# Patient Record
Sex: FEMALE | Race: WHITE | Employment: UNEMPLOYED | URBAN - METROPOLITAN AREA
[De-identification: names, ages, dates, MRNs, and addresses within clinical notes are randomized per-mention and may not be internally consistent; named-entity substitution may affect disease eponyms.]

---

## 2017-01-30 ENCOUNTER — APPOINTMENT (OUTPATIENT)
Dept: GENERAL RADIOLOGY | Age: 57
End: 2017-01-30
Attending: PHYSICIAN ASSISTANT
Payer: SELF-PAY

## 2017-01-30 ENCOUNTER — HOSPITAL ENCOUNTER (EMERGENCY)
Age: 57
Discharge: HOME | End: 2017-01-30
Attending: EMERGENCY MEDICINE
Payer: SELF-PAY

## 2017-01-30 VITALS
TEMPERATURE: 97.3 F | DIASTOLIC BLOOD PRESSURE: 70 MMHG | HEART RATE: 82 BPM | RESPIRATION RATE: 16 BRPM | BODY MASS INDEX: 20.27 KG/M2 | SYSTOLIC BLOOD PRESSURE: 118 MMHG | WEIGHT: 126.13 LBS | HEIGHT: 66 IN | OXYGEN SATURATION: 99 %

## 2017-01-30 DIAGNOSIS — S39.012A LUMBAR STRAIN, INITIAL ENCOUNTER: Primary | ICD-10-CM

## 2017-01-30 LAB
APPEARANCE UR: ABNORMAL
BACTERIA URNS QL MICRO: ABNORMAL /HPF
BILIRUB UR QL: NEGATIVE
COLOR UR: ABNORMAL
EPITH CASTS URNS QL MICRO: ABNORMAL /LPF
GLUCOSE UR STRIP.AUTO-MCNC: NEGATIVE MG/DL
HGB UR QL STRIP: NEGATIVE
HYALINE CASTS URNS QL MICRO: ABNORMAL /LPF (ref 0–5)
KETONES UR QL STRIP.AUTO: NEGATIVE MG/DL
LEUKOCYTE ESTERASE UR QL STRIP.AUTO: NEGATIVE
NITRITE UR QL STRIP.AUTO: POSITIVE
PH UR STRIP: 5.5 [PH] (ref 5–8)
PROT UR STRIP-MCNC: NEGATIVE MG/DL
RBC #/AREA URNS HPF: ABNORMAL /HPF (ref 0–5)
SP GR UR REFRACTOMETRY: 1.02 (ref 1–1.03)
UROBILINOGEN UR QL STRIP.AUTO: 0.2 EU/DL (ref 0.2–1)
WBC URNS QL MICRO: ABNORMAL /HPF (ref 0–4)

## 2017-01-30 PROCEDURE — 99283 EMERGENCY DEPT VISIT LOW MDM: CPT

## 2017-01-30 PROCEDURE — 74011250637 HC RX REV CODE- 250/637: Performed by: PHYSICIAN ASSISTANT

## 2017-01-30 PROCEDURE — 81001 URINALYSIS AUTO W/SCOPE: CPT | Performed by: EMERGENCY MEDICINE

## 2017-01-30 PROCEDURE — 72100 X-RAY EXAM L-S SPINE 2/3 VWS: CPT

## 2017-01-30 PROCEDURE — 74011250636 HC RX REV CODE- 250/636: Performed by: PHYSICIAN ASSISTANT

## 2017-01-30 PROCEDURE — 96372 THER/PROPH/DIAG INJ SC/IM: CPT

## 2017-01-30 RX ORDER — KETOROLAC TROMETHAMINE 30 MG/ML
30 INJECTION, SOLUTION INTRAMUSCULAR; INTRAVENOUS
Status: COMPLETED | OUTPATIENT
Start: 2017-01-30 | End: 2017-01-30

## 2017-01-30 RX ORDER — CYCLOBENZAPRINE HCL 10 MG
10 TABLET ORAL
Qty: 10 TAB | Refills: 0 | Status: SHIPPED | OUTPATIENT
Start: 2017-01-30 | End: 2018-04-05

## 2017-01-30 RX ORDER — CYCLOBENZAPRINE HCL 10 MG
10 TABLET ORAL
Status: COMPLETED | OUTPATIENT
Start: 2017-01-30 | End: 2017-01-30

## 2017-01-30 RX ORDER — HYDROCODONE BITARTRATE AND ACETAMINOPHEN 5; 325 MG/1; MG/1
1 TABLET ORAL
Status: COMPLETED | OUTPATIENT
Start: 2017-01-30 | End: 2017-01-30

## 2017-01-30 RX ORDER — IBUPROFEN 600 MG/1
600 TABLET ORAL
Qty: 15 TAB | Refills: 0 | Status: SHIPPED | OUTPATIENT
Start: 2017-01-30 | End: 2019-06-07

## 2017-01-30 RX ORDER — HYDROCODONE BITARTRATE AND ACETAMINOPHEN 5; 325 MG/1; MG/1
1 TABLET ORAL
Qty: 6 TAB | Refills: 0 | Status: SHIPPED | OUTPATIENT
Start: 2017-01-30 | End: 2018-04-05

## 2017-01-30 RX ADMIN — CYCLOBENZAPRINE HYDROCHLORIDE 10 MG: 10 TABLET, FILM COATED ORAL at 12:48

## 2017-01-30 RX ADMIN — HYDROCODONE BITARTRATE AND ACETAMINOPHEN 1 TABLET: 5; 325 TABLET ORAL at 12:48

## 2017-01-30 RX ADMIN — KETOROLAC TROMETHAMINE 30 MG: 30 INJECTION, SOLUTION INTRAMUSCULAR at 12:48

## 2017-01-30 NOTE — ED PROVIDER NOTES
HPI Comments: 64 y.o. female with extensive past medical history, please see list, significant for acid indigestion, CP, dizziness, alopecia, joint pain, joint swelling, trouble sleeping, PUD, depression, H. Pylori infection, migraines, hyperlipidemia, osteopenia, inflammatory arthritis, sicca syndrome, Sjogren's disease, diverticulosis of colon, lupus, breast biopsy, and  section who presents from home with chief complaint of back pain. Pt c/o lower R-sided back pain described as pinching rated 8/10 that started yesterday. Pt claims she bent over yesterday to  laundry when she felt a pinch in her lower R-side of her back. Pt claims that the pain has been constant and worsening since then. Pt denies radiation of pain into the  legs. Pt reports it is worse with movement and when stepping on her R foot she can feel the pain in her back. Pt took ibuprofen and tylenol with no relief, has also used topical OTC remedies. Pt reports she took 1 dose of Dennis's for pain relief this morning. Pt denies any fall or trauma to her back. Pt denies any urinary problems (other than frequency, which has been chronic for 5 years) or bowel issues. Pt denies any numbness, weakness, fever, dysuria, hematuria, CP, vomiting, diarrhea, or SOB. Patient denies weakness or numbness in the legs, urinary retention, incontinence of bowel or bladder, perineal numbness, fever, gait disturbance, or history of IV drug abuse. Pt denies having any surgeries on her back, or seeing a specialist for her back. There are no other acute medical concerns at this time. PCP: Lizzie Sotelo MD    Note written by Clara Milton Che, as dictated by KALA Forrester 12:53 PM        The history is provided by the patient. No  was used.         Past Medical History:   Diagnosis Date    Acid indigestion     Alopecia     Chest pain     Depression 3/2/2011    Diverticulosis of colon     Dizziness     External hemorrhoid     Family history of esophageal cancer     H. pylori infection     Hyperlipidemia     Hyperlipidemia     Inflammatory arthritis     Joint pain     Joint swelling     Lupus ([address]     Lupus ([address]     Migraines     Muscle aches     Osteopenia      DEXA 11    PUD (peptic ulcer disease)     Sicca syndrome ([address]     Sicca syndrome ([address] 2012    Sjogren's disease ([address] 2011    Sjogren's disease ([address] 2011    Sjogrens syndrome ([address]     Stiffness joints     Trouble in sleeping        Past Surgical History:   Procedure Laterality Date    Biopsy breast  2011     Benign    Hx  section       x 3         Family History:   Problem Relation Age of Onset    Cancer Mother 54     breast    Cancer Father      throat,stomach       Social History     Social History    Marital status:      Spouse name: Anand    Number of children: Chadwick    Years of education: 8     Occupational History    unemployed      Social History Main Topics    Smoking status: Current Some Day Smoker     Packs/day: 0.50     Years: 35.00     Types: Cigarettes    Smokeless tobacco: Never Used      Comment: 4-6 cigarettes per day    Alcohol use No    Drug use: No    Sexual activity: Yes     Partners: Male      Comment:  occasional get together     Other Topics Concern     Service No    Blood Transfusions No    Caffeine Concern No    Seat Belt Yes     sometimes    Self-Exams Yes     Social History Narrative         ALLERGIES: Mobic [meloxicam]; Codeine [codeine]; and Voltaren [diclofenac sodium]    Review of Systems   Constitutional: Negative for fever. Respiratory: Negative for shortness of breath. Cardiovascular: Negative for chest pain. Gastrointestinal: Negative for constipation, diarrhea and vomiting. Genitourinary: Negative for dysuria and hematuria. Musculoskeletal: Positive for back pain. Neurological: Negative for weakness and numbness.    All other systems reviewed and are negative. Vitals:    01/30/17 1157   BP: 121/63   Pulse: 90   Resp: 16   Temp: 97.3 °F (36.3 °C)   SpO2: 99%   Weight: 57.2 kg (126 lb 2 oz)   Height: 5' 6\" (1.676 m)            Physical Exam   Constitutional: She is oriented to person, place, and time. She appears well-developed and well-nourished. No distress. Pleasant WF   HENT:   Head: Normocephalic and atraumatic. Right Ear: External ear normal.   Left Ear: External ear normal.   Eyes: Conjunctivae are normal. No scleral icterus. Neck: Neck supple. No tracheal deviation present. Cardiovascular: Normal rate, regular rhythm and normal heart sounds. Exam reveals no gallop and no friction rub. No murmur heard. Pulmonary/Chest: Effort normal and breath sounds normal. No stridor. No respiratory distress. She has no wheezes. Abdominal: Soft. She exhibits no distension. Musculoskeletal: Normal range of motion. + right lumbar TTP without midline TTP   Neurological: She is alert and oriented to person, place, and time. 5/5 dorsi and plantar flexion   Skin: Skin is warm and dry. Psychiatric: She has a normal mood and affect. Her behavior is normal.   Nursing note and vitals reviewed. MDM  Number of Diagnoses or Management Options  Lumbar strain, initial encounter:   Diagnosis management comments: 64year old female presenting to the ED for right lower back pain, onset while bending over to  laundry yesterday. No radicular/cord compression symptoms. Good distal strength on exam.  Mild DDD on XR, o/w unremarkable.  reviewed. Discussed use of NSAID, muscle relaxer, pain medicine PRN, spine f/u PRN. Amount and/or Complexity of Data Reviewed  Tests in the radiology section of CPT®: reviewed and ordered  Discuss the patient with other providers: yes (Dr. Jose Antonio Valdez, ED attending)      ED Course       Procedures          reviewed, 1 Rx in the last year.   KALA Bhardwaj  1:45 PM

## 2017-01-30 NOTE — DISCHARGE INSTRUCTIONS
We hope that we have addressed all of your medical concerns. The examination and treatment you received in the Emergency Department were for an emergent problem and were not intended as complete care. It is important that you follow up with your healthcare provider(s) for ongoing care. If your symptoms worsen or do not improve as expected, and you are unable to reach your usual health care provider(s), you should return to the Emergency Department. Today's healthcare is undergoing tremendous change, and patient satisfaction surveys are one of the many tools to assess the quality of medical care. You may receive a survey from the [employer] regarding your experience in the Emergency Department. I hope that your experience has been completely positive, particularly the medical care that I provided. As such, please participate in the survey; anything less than excellent does not meet my expectations or intentions. [facility] and [address] [place] [address] participate in nationally recognized quality of care measures. If your blood pressure is greater than 120/80, as reported below, we urge that you seek medical care to address the potential of high blood pressure, commonly known as hypertension. Hypertension can be hereditary or can be caused by certain medical conditions, pain, stress, or \"white coat syndrome. \"       Please make an appointment with your health care provider(s) for follow up of your Emergency Department visit. VITALS:   Patient Vitals for the past 8 hrs:   Temp Pulse Resp BP SpO2   01/30/17 1157 97.3 °F (36.3 °C) 90 16 121/63 99 %          Thank you for allowing us to provide you with medical care today. We realize that you have many choices for your emergency care needs. Please choose us in the future for any continued health care needs. Molly Stoddard, [facility].   Office: [phone]            Recent Results (from the past 24 hour(s))   URINALYSIS W/MICROSCOPIC    Collection Time: 01/30/17  1:05 PM   Result Value Ref Range    Color YELLOW/STRAW      Appearance CLOUDY (A) CLEAR      Specific gravity 1.025 1.003 - 1.030      pH (UA) 5.5 5.0 - 8.0      Protein NEGATIVE  NEG mg/dL    Glucose NEGATIVE  NEG mg/dL    Ketone NEGATIVE  NEG mg/dL    Bilirubin NEGATIVE  NEG      Blood NEGATIVE  NEG      Urobilinogen 0.2 0.2 - 1.0 EU/dL    Nitrites POSITIVE (A) NEG      Leukocyte Esterase NEGATIVE  NEG      WBC 0-4 0 - 4 /hpf    RBC 0-5 0 - 5 /hpf    Epithelial cells MODERATE (A) FEW /lpf    Bacteria 4+ (A) NEG /hpf    Hyaline Cast 2-5 0 - 5 /lpf       Xr Spine Lumb 2 Or 3 V    Result Date: 1/30/2017  INDICATION: Back pain following lifting injury yesterday COMPARISON: December 15, 2015 FINDINGS: AP, lateral, and coned-down lateral views of the lumbar spine demonstrate 5 lumbar type vertebral bodies. The alignment is normal. There is no evidence of acute fracture. Degenerative disc disease is again seen at L5-S1. The sacroiliac joints appear intact. No soft tissue abnormalities are seen. IMPRESSION: No evidence of lumbar spine fracture or malalignment. Back Pain, Emergency or Urgent Symptoms: Care Instructions  Your Care Instructions  Many people have back pain at one time or another. In most cases, pain gets better with self-care that includes over-the-counter pain medicine, ice, heat, and exercises. Unless you have symptoms of a severe injury or heart attack, you may be able to give yourself a few days before you call a doctor. But some back problems are very serious. Do not ignore symptoms that need to be checked right away. Follow-up care is a key part of your treatment and safety. Be sure to make and go to all appointments, and call your doctor if you are having problems. It's also a good idea to know your test results and keep a list of the medicines you take.   How can you care for yourself at home? · Sit or lie in positions that are most comfortable and that reduce your pain. Try one of these positions when you lie down:  ¨ Lie on your back with your knees bent and supported by large pillows. ¨ Lie on the floor with your legs on the seat of a sofa or chair. Addison Loach on your side with your knees and hips bent and a pillow between your legs. ¨ Lie on your stomach if it does not make pain worse. · Do not sit up in bed, and avoid soft couches and twisted positions. Bed rest can help relieve pain at first, but it delays healing. Avoid bed rest after the first day. · Change positions every 30 minutes. If you must sit for long periods of time, take breaks from sitting. Get up and walk around, or lie flat. · Try using a heating pad on a low or medium setting, for 15 to 20 minutes every 2 or 3 hours. Try a warm shower in place of one session with the heating pad. You can also buy single-use heat wraps that last up to 8 hours. You can also try ice or cold packs on your back for 10 to 20 minutes at a time, several times a day. (Put a thin cloth between the ice pack and your skin.) This reduces pain and makes it easier to be active and exercise. · Take pain medicines exactly as directed. ¨ If the doctor gave you a prescription medicine for pain, take it as prescribed. ¨ If you are not taking a prescription pain medicine, ask your doctor if you can take an over-the-counter medicine. When should you call for help? Call 911 anytime you think you may need emergency care. For example, call if:  · You are unable to move a leg at all. · You have back pain with severe belly pain. · You have symptoms of a heart attack. These may include:  ¨ Chest pain or pressure, or a strange feeling in the chest.  ¨ Sweating. ¨ Shortness of breath. ¨ Nausea or vomiting. ¨ Pain, pressure, or a strange feeling in the back, neck, jaw, or upper belly or in one or both shoulders or arms.   ¨ Lightheadedness or sudden weakness. ¨ A fast or irregular heartbeat. After you call 911, the  may tell you to chew 1 adult-strength or 2 to 4 low-dose aspirin. Wait for an ambulance. Do not try to drive yourself. Call your doctor now or seek immediate medical care if:  · You have new or worse symptoms in your arms, legs, chest, belly, or buttocks. Symptoms may include:  ¨ Numbness or tingling. ¨ Weakness. ¨ Pain. · You lose bladder or bowel control. · You have back pain and:  ¨ You have injured your back while lifting or doing some other activity. Call if the pain is severe, has not gone away after 1 or 2 days, and you cannot do your normal daily activities. ¨ You have had a back injury before that needed treatment. ¨ Your pain has lasted longer than 4 weeks. ¨ You have had weight loss you cannot explain. ¨ You are age 48 or older. ¨ You have cancer now or have had it before. Watch closely for changes in your health, and be sure to contact your doctor if you are not getting better as expected. Where can you learn more? Go to http://jaja-ammon.info/. Enter N132 in the search box to learn more about \"Back Pain, Emergency or Urgent Symptoms: Care Instructions. \"  Current as of: May 27, 2016  Content Version: 11.1  © 8801-3844 [employer]. Care instructions adapted under license by [employer] (which disclaims liability or warranty for this information). If you have questions about a medical condition or this instruction, always ask your healthcare professional. [place] [address] any warranty or liability for your use of this information. Back Pain: Care Instructions  Your Care Instructions    Back pain has many possible causes. It is often related to problems with muscles and ligaments of the back. It may also be related to problems with the nerves, discs, or bones of the back.  Moving, lifting, standing, sitting, or sleeping in an awkward way can strain the back. Sometimes you don't notice the injury until later. Arthritis is another common cause of back pain. Although it may hurt a lot, back pain usually improves on its own within several weeks. Most people recover in 12 weeks or less. Using good home treatment and being careful not to stress your back can help you feel better sooner. Follow-up care is a key part of your treatment and safety. Be sure to make and go to all appointments, and call your doctor if you are having problems. Its also a good idea to know your test results and keep a list of the medicines you take. How can you care for yourself at home? · Sit or lie in positions that are most comfortable and reduce your pain. Try one of these positions when you lie down:  ¨ Lie on your back with your knees bent and supported by large pillows. ¨ Lie on the floor with your legs on the seat of a sofa or chair. Sherly Glo on your side with your knees and hips bent and a pillow between your legs. ¨ Lie on your stomach if it does not make pain worse. · Do not sit up in bed, and avoid soft couches and twisted positions. Bed rest can help relieve pain at first, but it delays healing. Avoid bed rest after the first day of back pain. · Change positions every 30 minutes. If you must sit for long periods of time, take breaks from sitting. Get up and walk around, or lie in a comfortable position. · Try using a heating pad on a low or medium setting for 15 to 20 minutes every 2 or 3 hours. Try a warm shower in place of one session with the heating pad. · You can also try an ice pack for 10 to 15 minutes every 2 to 3 hours. Put a thin cloth between the ice pack and your skin. · Take pain medicines exactly as directed. ¨ If the doctor gave you a prescription medicine for pain, take it as prescribed. ¨ If you are not taking a prescription pain medicine, ask your doctor if you can take an over-the-counter medicine. · Take short walks several times a day. You can start with 5 to 10 minutes, 3 or 4 times a day, and work up to longer walks. Walk on level surfaces and avoid hills and stairs until your back is better. · Return to work and other activities as soon as you can. Continued rest without activity is usually not good for your back. · To prevent future back pain, do exercises to stretch and strengthen your back and stomach. Learn how to use good posture, safe lifting techniques, and proper body mechanics. When should you call for help? Call your doctor now or seek immediate medical care if:  · You have new or worsening numbness in your legs. · You have new or worsening weakness in your legs. (This could make it hard to stand up.)  · You lose control of your bladder or bowels. Watch closely for changes in your health, and be sure to contact your doctor if:  · Your pain gets worse. · You are not getting better after 2 weeks. Where can you learn more? Go to http://jaja-ammon.info/. Enter C599 in the search box to learn more about \"Back Pain: Care Instructions. \"  Current as of: May 23, 2016  Content Version: 11.1  © 8379-7229 "[employer]. Care instructions adapted under license by [employer] (which disclaims liability or warranty for this information). If you have questions about a medical condition or this instruction, always ask your healthcare professional. [address] any warranty or liability for your use of this information.

## 2017-01-30 NOTE — Clinical Note
- return for new or concerning symptoms 
- if pain worsens or does not improve, follow up with Dr. Matt Howard Pain medicine may cause drowsiness; take with caution

## 2017-01-30 NOTE — ED TRIAGE NOTES
Triage Note: Patient reports non-radiating right lower back pain after lifting some laundry yesterday. Patient also reports urinary frequency x 5 years.

## 2017-11-27 ENCOUNTER — APPOINTMENT (OUTPATIENT)
Dept: GENERAL RADIOLOGY | Age: 57
End: 2017-11-27
Attending: EMERGENCY MEDICINE
Payer: SELF-PAY

## 2017-11-27 ENCOUNTER — HOSPITAL ENCOUNTER (EMERGENCY)
Age: 57
Discharge: HOME | End: 2017-11-27
Attending: EMERGENCY MEDICINE | Admitting: EMERGENCY MEDICINE
Payer: SELF-PAY

## 2017-11-27 VITALS
BODY MASS INDEX: 17.79 KG/M2 | HEART RATE: 79 BPM | TEMPERATURE: 97.9 F | DIASTOLIC BLOOD PRESSURE: 64 MMHG | SYSTOLIC BLOOD PRESSURE: 120 MMHG | WEIGHT: 110.67 LBS | OXYGEN SATURATION: 99 % | RESPIRATION RATE: 16 BRPM | HEIGHT: 66 IN

## 2017-11-27 DIAGNOSIS — R07.89 ATYPICAL CHEST PAIN: ICD-10-CM

## 2017-11-27 DIAGNOSIS — M46.1 SACROILIAC INFLAMMATION (HCC): Primary | ICD-10-CM

## 2017-11-27 DIAGNOSIS — J43.9 PULMONARY EMPHYSEMA, UNSPECIFIED EMPHYSEMA TYPE (HCC): ICD-10-CM

## 2017-11-27 LAB
ATRIAL RATE: 79 BPM
CALCULATED P AXIS, ECG09: -25 DEGREES
CALCULATED R AXIS, ECG10: -12 DEGREES
CALCULATED T AXIS, ECG11: -3 DEGREES
DIAGNOSIS, 93000: NORMAL
P-R INTERVAL, ECG05: 146 MS
Q-T INTERVAL, ECG07: 378 MS
QRS DURATION, ECG06: 80 MS
QTC CALCULATION (BEZET), ECG08: 433 MS
TROPONIN I BLD-MCNC: <0.04 NG/ML (ref 0–0.08)
VENTRICULAR RATE, ECG03: 79 BPM

## 2017-11-27 PROCEDURE — 99283 EMERGENCY DEPT VISIT LOW MDM: CPT

## 2017-11-27 PROCEDURE — 71020 XR CHEST PA LAT: CPT

## 2017-11-27 PROCEDURE — 84484 ASSAY OF TROPONIN QUANT: CPT

## 2017-11-27 PROCEDURE — 93005 ELECTROCARDIOGRAM TRACING: CPT

## 2017-11-27 RX ORDER — ASPIRIN 81 MG/1
81 TABLET ORAL DAILY
Qty: 30 TAB | Refills: 0 | Status: SHIPPED | OUTPATIENT
Start: 2017-11-27 | End: 2017-12-27

## 2017-11-27 RX ORDER — METHYLPREDNISOLONE 4 MG/1
TABLET ORAL
Qty: 1 DOSE PACK | Refills: 0 | Status: SHIPPED | OUTPATIENT
Start: 2017-11-27 | End: 2019-06-07

## 2017-11-27 NOTE — ED PROVIDER NOTES
HPI Comments: 62 y.o. female with extensive past medical history, please see list, significant for lupus, depression,and PUD who presents from home via private vehicle with chief complaint of back pain. Pt reports moderate right lower back pain described as \"burning\". Pt states pain will intermittently radiate to the anterior thigh. Pt reports she was told she would develop sciatica but does not know what kind of testing was done to show this. Pt reports taking motrin and advil for pain with ice for some relief. Pt states heat aggravates pain. Pt also reports polyuria and intermittent CP over the last month. Pt reports she is not having CP today, last episode was 2 days ago. Pt denies any leg swelling, fever, recent falls, or any incontinence. There are no other acute medical concerns at this time. Social hx: Current smoker; No EtOH use  PCP: Devorah Berger MD    Note written by Wilton Roy. Merly Steinberg, as dictated by Edilson Hernandez MD 9:12 AM          The history is provided by the patient. No  was used.         Past Medical History:   Diagnosis Date    Acid indigestion     Alopecia     Chest pain     Depression 3/2/2011    Diverticulosis of colon     Dizziness     External hemorrhoid     Family history of esophageal cancer     H. pylori infection     Hyperlipidemia     Hyperlipidemia     Inflammatory arthritis     Joint pain     Joint swelling     Lupus     Lupus     Migraines     Muscle aches     Osteopenia     DEXA 11    PUD (peptic ulcer disease)     Sicca syndrome ([address]     Sicca syndrome ([address] 2012    Sjogren's disease ([address] 2011    Sjogren's disease ([address] 2011    Sjogrens syndrome ([address]     Stiffness joints     Trouble in sleeping        Past Surgical History:   Procedure Laterality Date    BIOPSY BREAST  2011    Benign    HX  SECTION      x 3         Family History:   Problem Relation Age of Onset    Cancer Mother 54 breast    Cancer Father      throat,stomach       Social History     Social History    Marital status:      Spouse name: Subha Sifuentes Number of children: 3    Years of education: 8     Occupational History    unemployed      Social History Main Topics    Smoking status: Current Some Day Smoker     Packs/day: 0.50     Years: 35.00     Types: Cigarettes    Smokeless tobacco: Never Used      Comment: 4-6 cigarettes per day    Alcohol use No    Drug use: No    Sexual activity: Yes     Partners: Male      Comment:  occasional get together     Other Topics Concern     Service No    Blood Transfusions No    Caffeine Concern No    Seat Belt Yes     sometimes    Self-Exams Yes     Social History Narrative         ALLERGIES: Mobic [meloxicam]; Codeine [codeine]; and Voltaren [diclofenac sodium]    Review of Systems   Constitutional: Negative for fever. Cardiovascular: Positive for chest pain. Negative for leg swelling. Endocrine: Positive for polyuria. Genitourinary: Negative for difficulty urinating. Musculoskeletal: Positive for back pain and myalgias. All other systems reviewed and are negative.       Vitals:    11/27/17 0849   BP: 146/67   Pulse: (!) 107   Resp: 16   Temp: 97.7 °F (36.5 °C)   SpO2: 97%   Weight: 50.2 kg (110 lb 10.7 oz)   Height: 5' 6\" (1.676 m)            Physical Exam   Physical Examination: General appearance - alert, well appearing, and in no distress, oriented to person, place, and time and normal appearing weight  Eyes - pupils equal and reactive, extraocular eye movements intact  Neck - supple, no significant adenopathy  Chest - clear to auscultation, no wheezes, rales or rhonchi, symmetric air entry  Heart - normal rate, regular rhythm, normal S1, S2, no murmurs, rubs, clicks or gallops  Abdomen - soft, nontender, nondistended, no masses or organomegaly  Back exam - full range of motion, no tenderness, palpable spasm or pain on motion, no midline spinal tenderness, mild tenderness over right SI joint, no overlying erythema or warmth  Neurological - alert, oriented, normal speech, no focal findings or movement disorder noted  Musculoskeletal - no joint tenderness, deformity or swelling  Extremities - peripheral pulses normal, no pedal edema, no clubbing or cyanosis  Skin - normal coloration and turgor, no rashes, no suspicious skin lesions noted  MDM  Number of Diagnoses or Management Options     Amount and/or Complexity of Data Reviewed  Clinical lab tests: ordered and reviewed  Tests in the radiology section of CPT®: ordered and reviewed  Independent visualization of images, tracings, or specimens: yes    Patient Progress  Patient progress: stable    ED Course       Procedures  EKG interpretation: (Preliminary)  Rhythm: normal sinus rhythm; and regular . Rate (approx.): 79; Axis: left axis deviation; NH interval: normal; QRS interval: normal ; ST/T wave: non-specific changes; Pt with low back pain, no bowel/bladder incontinence or saddle anesthesia. F/u with pcp or return to ED for worsening symptoms. Discussed incidental xray findings of emphysema. Counseled on quitting smoking.

## 2017-11-27 NOTE — DISCHARGE INSTRUCTIONS
Chest Pain: Care Instructions  Your Care Instructions    There are many things that can cause chest pain. Some are not serious and will get better on their own in a few days. But some kinds of chest pain need more testing and treatment. Your doctor may have recommended a follow-up visit in the next 8 to 12 hours. If you are not getting better, you may need more tests or treatment. Even though your doctor has released you, you still need to watch for any problems. The doctor carefully checked you, but sometimes problems can develop later. If you have new symptoms or if your symptoms do not get better, get medical care right away. If you have worse or different chest pain or pressure that lasts more than 5 minutes or you passed out (lost consciousness), call 911 or seek other emergency help right away. A medical visit is only one step in your treatment. Even if you feel better, you still need to do what your doctor recommends, such as going to all suggested follow-up appointments and taking medicines exactly as directed. This will help you recover and help prevent future problems. How can you care for yourself at home? · Rest until you feel better. · Take your medicine exactly as prescribed. Call your doctor if you think you are having a problem with your medicine. · Do not drive after taking a prescription pain medicine. When should you call for help? Call 911 if:  ? · You passed out (lost consciousness). ? · You have severe difficulty breathing. ? · You have symptoms of a heart attack. These may include:  ¨ Chest pain or pressure, or a strange feeling in your chest.  ¨ Sweating. ¨ Shortness of breath. ¨ Nausea or vomiting. ¨ Pain, pressure, or a strange feeling in your back, neck, jaw, or upper belly or in one or both shoulders or arms. ¨ Lightheadedness or sudden weakness. ¨ A fast or irregular heartbeat.   After you call 911, the  may tell you to chew 1 adult-strength or 2 to 4 low-dose aspirin. Wait for an ambulance. Do not try to drive yourself. ?Call your doctor today if:  ? · You have any trouble breathing. ? · Your chest pain gets worse. ? · You are dizzy or lightheaded, or you feel like you may faint. ? · You are not getting better as expected. ? · You are having new or different chest pain. Where can you learn more? Go to http://jaja-ammon.info/. Enter A120 in the search box to learn more about \"Chest Pain: Care Instructions. \"  Current as of: March 20, 2017  Content Version: 11.4  © 4217-5545 [employer]. Care instructions adapted under license by [employer] (which disclaims liability or warranty for this information). If you have questions about a medical condition or this instruction, always ask your healthcare professional. [address] any warranty or liability for your use of this information. Sacroiliac Joint Pain: Care Instructions  Your Care Instructions    The sacroiliac joints connect the spine and each side of the pelvis. These joints bear the weight and stress of your torso. This makes them easy to injure. Injury or overuse of these joints may cause low back pain. Stress on these joints can cause joint pain. Sacroiliac joint pain is more common in pregnant women. Certain kinds of arthritis also may cause this type of joint pain. Home treatment may help you feel better. So can avoiding activities that stress your back. Your doctor also may recommend physical therapy. This may include doing exercises and stretches to help with pain. You may also learn to use good posture. Follow-up care is a key part of your treatment and safety. Be sure to make and go to all appointments, and call your doctor if you are having problems. It's also a good idea to know your test results and keep a list of the medicines you take. How can you care for yourself at home?   · Ask your doctor about light exercises that may help your back pain. Try to do light activity throughout the day. But make sure to take rests as needed. Find a comfortable position for rest, but don't stay in one position for too long. Avoid activities that cause pain. · To apply heat, put a warm water bottle, a heating pad set on low, or a warm cloth on your back. Do not go to sleep with a heating pad on your skin. · Put ice or a cold pack on your back for 10 to 20 minutes at a time. Put a thin cloth between the ice and your skin. · If the doctor gave you a prescription medicine for pain, take it as prescribed. · If you are not taking a prescription pain medicine, ask your doctor if you can take an over-the-counter pain medicine, such as acetaminophen (Tylenol), ibuprofen (Advil, Motrin), or naproxen (Aleve). Read and follow all instructions on the label. Take pain medicines exactly as directed. · Do not take two or more pain medicines at the same time unless the doctor told you to. Many pain medicines have acetaminophen, which is Tylenol. Too much acetaminophen (Tylenol) can be harmful. · To prevent future back pain, do exercises to stretch and strengthen your back and stomach. Learn how to use good posture, safe lifting techniques, and proper body mechanics. When should you call for help? Call 911 anytime you think you may need emergency care. For example, call if:  ? · You are unable to move a leg at all. ?Call your doctor now or seek immediate medical care if:  ? · You have new or worse symptoms in your legs or buttocks. Symptoms may include:  ¨ Numbness or tingling. ¨ Weakness. ¨ Pain. ? · You lose bladder or bowel control. ? Watch closely for changes in your health, and be sure to contact your doctor if:  ? · You are not getting better as expected. Where can you learn more? Go to http://jaja-ammon.info/.   Enter A643 in the search box to learn more about \"Sacroiliac Joint Pain: Care Instructions. \"  Current as of: March 21, 2017  Content Version: 11.4  © 4639-7966 [employer]. Care instructions adapted under license by [employer] (which disclaims liability or warranty for this information). If you have questions about a medical condition or this instruction, always ask your healthcare professional. [address] any warranty or liability for your use of this information. Sacroiliac Pain: Exercises  Your Care Instructions  Here are some examples of typical rehabilitation exercises for your condition. Start each exercise slowly. Ease off the exercise if you start to have pain. Your doctor or physical therapist will tell you when you can start these exercises and which ones will work best for you. How to do the exercises  Knee-to-chest stretch    1. Do not do the knee-to-chest exercise if it causes or increases back or leg pain. 2. Lie on your back with your knees bent and your feet flat on the floor. You can put a small pillow under your head and neck if it is more comfortable. 3. Grasp your hands under one knee and bring the knee to your chest, keeping the other foot flat on the floor. 4. Keep your lower back pressed to the floor. Hold for at least 15 to 30 seconds. 5. Relax and lower the knee to the starting position. Repeat with the other leg. 6. Repeat 2 to 4 times with each leg. 7. To get more stretch, keep your other leg flat on the floor while pulling your knee to your chest.  Bridging    1. Lie on your back with both knees bent. Your knees should be bent about 90 degrees. 2. Tighten your belly muscles by pulling in your belly button toward your spine. Then push your feet into the floor, squeeze your buttocks, and lift your hips off the floor until your shoulders, hips, and knees are all in a straight line.   3. Hold for about 6 seconds as you continue to breathe normally, and then slowly lower your hips back down to the floor and rest for up to 10 seconds. 4. Repeat 8 to 12 times. Hip extension    1. Get down on your hands and knees on the floor. 2. Keeping your back and neck straight, lift one leg straight out behind you. When you lift your leg, keep your hips level. Don't let your back twist, and don't let your hip drop toward the floor. 3. Hold for 6 seconds. Repeat 8 to 12 times with each leg. 4. If you feel steady and strong when you do this exercise, you can make it more difficult. To do this, when you lift your leg, also lift the opposite arm straight out in front of you. For example, lift the left leg and the right arm at the same time. (This is sometimes called the \"bird dog exercise. \") Hold for 6 seconds, and repeat 8 to 12 times on each side. Clamshell    1. Lie on your side with a pillow under your head. Keep your feet and knees together and your knees bent. 2. Raise your top knee, but keep your feet together. Do not let your hips roll back. Your legs should open up like a clamshell. 3. Hold for 6 seconds. 4. Slowly lower your knee back down. Rest for 10 seconds. 5. Repeat 8 to 12 times. 6. Switch to your other side and repeat steps 1 through 5. Hamstring wall stretch    1. Lie on your back in a doorway, with one leg through the open door. 2. Slide your affected leg up the wall to straighten your knee. You should feel a gentle stretch down the back of your leg. 1. Do not arch your back. 2. Do not bend either knee. 3. Keep one heel touching the floor and the other heel touching the wall. Do not point your toes. 3. Hold the stretch for at least 1 minute to begin. Then try to lengthen the time you hold the stretch to as long as 6 minutes. 4. Switch legs, and repeat steps 1 through 3.  5. Repeat 2 to 4 times. 6. If you do not have a place to do this exercise in a doorway, there is another way to do it:  7. Lie on your back, and bend one knee.   8. Loop a towel under the ball and toes of that foot, and hold the ends of the towel in your hands. 9. Straighten your knee, and slowly pull back on the towel. You should feel a gentle stretch down the back of your leg. 10. Switch legs, and repeat steps 1 through 3.  11. Repeat 2 to 4 times. Lower abdominal strengthening    1. Lie on your back with your knees bent and your feet flat on the floor. 2. Tighten your belly muscles by pulling your belly button in toward your spine. 3. Lift one foot off the floor and bring your knee toward your chest, so that your knee is straight above your hip and your leg is bent like the letter \"L. \"  4. Lift the other knee up to the same position. 5. Lower one leg at a time to the starting position. 6. Keep alternating legs until you have lifted each leg 8 to 12 times. 7. Be sure to keep your belly muscles tight and your back still as you are moving your legs. Be sure to breathe normally. Piriformis stretch    1. Lie on your back with your legs straight. 2. Lift your affected leg, and bend your knee. With your opposite hand, reach across your body, and then gently pull your knee toward your opposite shoulder. 3. Hold the stretch for 15 to 30 seconds. 4. Switch legs and repeat steps 1 through 3.  5. Repeat 2 to 4 times. Follow-up care is a key part of your treatment and safety. Be sure to make and go to all appointments, and call your doctor if you are having problems. It's also a good idea to know your test results and keep a list of the medicines you take. Where can you learn more? Go to http://jaja-ammon.info/. Enter L494 in the search box to learn more about \"Sacroiliac Pain: Exercises. \"  Current as of: March 21, 2017  Content Version: 11.4  © 3095-2056 Healthwise, [employer]. Care instructions adapted under license by [employer] (which disclaims liability or warranty for this information).  If you have questions about a medical condition or this instruction, always ask your healthcare professional. [employer] disclaims any warranty or liability for your use of this information. We hope that we have addressed all of your medical concerns. The examination and treatment you received in the Emergency Department were for an emergent problem and were not intended as complete care. It is important that you follow up with your healthcare provider(s) for ongoing care. If your symptoms worsen or do not improve as expected, and you are unable to reach your usual health care provider(s), you should return to the Emergency Department. Today's healthcare is undergoing tremendous change, and patient satisfaction surveys are one of the many tools to assess the quality of medical care. You may receive a survey from the [employer] regarding your experience in the Emergency Department. I hope that your experience has been completely positive, particularly the medical care that I provided. As such, please participate in the survey; anything less than excellent does not meet my expectations or intentions. [facility] and [address] [place] [address] participate in nationally recognized quality of care measures. If your blood pressure is greater than 120/80, as reported below, we urge that you seek medical care to address the potential of high blood pressure, commonly known as hypertension. Hypertension can be hereditary or can be caused by certain medical conditions, pain, stress, or \"white coat syndrome. \"       Please make an appointment with your health care provider(s) for follow up of your Emergency Department visit. VITALS:   Patient Vitals for the past 8 hrs:   Temp Pulse Resp BP SpO2   11/27/17 0849 97.7 °F (36.5 °C) (!) 107 16 146/67 97 %          Thank you for allowing us to provide you with medical care today. We realize that you have many choices for your emergency care needs.   Please choose us in the future for any continued health care nathaniel.      Galina Rodríguezankit Cali, [address] [place] [address] [address]   Office: [phone]            Recent Results (from the past 24 hour(s))   POC TROPONIN-I    Collection Time: 11/27/17  9:26 AM   Result Value Ref Range    Troponin-I (POC) <0.04 0.00 - 0.08 ng/mL   EKG, 12 LEAD, INITIAL    Collection Time: 11/27/17  9:30 AM   Result Value Ref Range    Ventricular Rate 79 BPM    Atrial Rate 79 BPM    P-R Interval 146 ms    QRS Duration 80 ms    Q-T Interval 378 ms    QTC Calculation (Bezet) 433 ms    Calculated P Axis -25 degrees    Calculated R Axis -12 degrees    Calculated T Axis -3 degrees    Diagnosis       Normal sinus rhythm  When compared with ECG of 06-JAN-2016 12:15,  Questionable change in QRS axis  T wave inversion now evident in Inferior leads         Xr Chest Pa Lat    Result Date: 11/27/2017  Exam:  2 view chest Indication: Chest pain. COMPARISON: 1/6/2016 PA and lateral views demonstrate normal heart size. The lungs are hyperinflated and there are findings consistent with emphysema. There is no pneumonia. No adenopathy or pleural effusions. The visualized osseous structures are unremarkable. IMPRESSION: 1. Hyperinflated lungs with findings suggestive of emphysema.

## 2017-11-27 NOTE — ED TRIAGE NOTES
Lower back pain radiating into right leg onset 3 weeks ago. Denies injury. Reports hx of sciatic pain.

## 2018-04-03 ENCOUNTER — APPOINTMENT (OUTPATIENT)
Dept: GENERAL RADIOLOGY | Age: 58
End: 2018-04-03
Attending: EMERGENCY MEDICINE
Payer: SELF-PAY

## 2018-04-03 ENCOUNTER — HOSPITAL ENCOUNTER (EMERGENCY)
Age: 58
Discharge: HOME | End: 2018-04-03
Attending: EMERGENCY MEDICINE
Payer: SELF-PAY

## 2018-04-03 VITALS
TEMPERATURE: 97.4 F | HEIGHT: 66 IN | HEART RATE: 78 BPM | WEIGHT: 110.8 LBS | OXYGEN SATURATION: 98 % | BODY MASS INDEX: 17.81 KG/M2 | DIASTOLIC BLOOD PRESSURE: 79 MMHG | RESPIRATION RATE: 18 BRPM | SYSTOLIC BLOOD PRESSURE: 146 MMHG

## 2018-04-03 DIAGNOSIS — S63.611A SPRAIN OF LEFT INDEX FINGER, UNSPECIFIED SITE OF FINGER, INITIAL ENCOUNTER: Primary | ICD-10-CM

## 2018-04-03 PROCEDURE — 73140 X-RAY EXAM OF FINGER(S): CPT

## 2018-04-03 PROCEDURE — 77030008323 HC SPLNT FNGR GTR DJOR -A

## 2018-04-03 PROCEDURE — 99282 EMERGENCY DEPT VISIT SF MDM: CPT

## 2018-04-03 NOTE — LETTER
[address] 
[address] [facility] 7 [id number] 
[phone] Work/School Note Date: 4/3/2018 To Whom It May concern: 
 
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 
Attending Provider: Hawa Arias MD. 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 Sincerely, Hawa Arias MD

## 2018-04-03 NOTE — DISCHARGE INSTRUCTIONS
We hope that we have addressed all of your medical concerns. The examination and treatment you received in the Emergency Department were for an emergent problem and were not intended as complete care. It is important that you follow up with your healthcare provider(s) for ongoing care. If your symptoms worsen or do not improve as expected, and you are unable to reach your usual health care provider(s), you should return to the Emergency Department. Today's healthcare is undergoing tremendous change, and patient satisfaction surveys are one of the many tools to assess the quality of medical care. You may receive a survey from the [employer] regarding your experience in the Emergency Department. I hope that your experience has been completely positive, particularly the medical care that I provided. As such, please participate in the survey; anything less than excellent does not meet my expectations or intentions. [facility] and [address] [place] [address] participate in nationally recognized quality of care measures. If your blood pressure is greater than 120/80, as reported below, we urge that you seek medical care to address the potential of high blood pressure, commonly known as hypertension. Hypertension can be hereditary or can be caused by certain medical conditions, pain, stress, or \"white coat syndrome. \"       Please make an appointment with your health care provider(s) for follow up of your Emergency Department visit. VITALS:   Patient Vitals for the past 8 hrs:   Temp Pulse Resp BP SpO2   04/03/18 0834 97.4 °F (36.3 °C) 78 18 146/79 98 %          Thank you for allowing us to provide you with medical care today. We realize that you have many choices for your emergency care needs. Please choose us in the future for any continued health care needs. Garett Butt, [address] [facility].   Office: [phone]            No results found for this or any previous visit (from the past 24 hour(s)). Xr 2nd Finger Lt Min 2 V    Result Date: 4/3/2018  EXAM:  XR 2ND FINGER LT MIN 2 V INDICATION:   l finger pain. COMPARISON: None. FINDINGS: Three views of the left  second finger demonstrate no fracture or other acute osseous or articular abnormality. The soft tissues are within normal limits. IMPRESSION:   No acute abnormality. Finger Sprain: Rehab Exercises  Your Care Instructions  Here are some examples of typical rehabilitation exercises for your condition. Start each exercise slowly. Ease off the exercise if you start to have pain. Your doctor or your physical or occupational therapist will tell you when you can start these exercises and which ones will work best for you. How to do the exercises  Finger extension    1. Place your hand flat on a table, palm down. 2. Lift and then lower your affected finger off the table. 3. Repeat 8 to 12 times. MP extension    1. Place your good hand on a table, palm up. Put your hand with the affected finger on top of your good hand with your fingers wrapped around the thumb of your good hand like you are making a fist.  2. Slowly uncurl the joints of your hand with the affected finger where your fingers connect to your hand so that only the top two joints of your fingers are bent. Your fingers will look like a hook. 3. Move back to your starting position, with your fingers wrapped around your good thumb. 4. Repeat 8 to 12 times. DIP flexion    1. With your good hand, grasp your affected finger. Your thumb will be on the top side of your finger just below the joint that is closest to your fingernail. 2. Slowly bend your affected finger only at the joint closest to your fingernail. Hold for about 6 seconds. 3. Repeat 8 to 12 times. PIP extension (with MP extension)    1. Place your good hand on a table, palm up.  Put your hand with the affected finger on top of your good hand. 2. Use the thumb and fingers of your good hand to grasp below the middle joint of your affected finger. 3. Bend and then straighten the last two joints of your affected finger. 4. Repeat 8 to 12 times. Isolated PIP flexion    1. Place the hand with the affected finger flat on a table, palm up. With your other hand, press down on the fingers that are not affected. Your affected finger will be free to move. 2. Slowly bend your affected finger. Hold for about 6 seconds. Then straighten your finger. 3. Repeat 8 to 12 times. Imaginary ball squeeze    1. Pretend to hold an imaginary ball. 2. Slowly bend your fingers around the imaginary ball, and squeeze the \"ball\" for about 6 seconds. Then slowly straighten your fingers to release the \"ball. \"  3. Repeat 8 to 12 times. Tendon glides    1. In this exercise, the steps follow one another to a make a continuous movement. 2. Hold your hand upward. Your fingers and thumb will be pointing straight up. Your wrist should be relaxed, following the line of your fingers and thumb. 3. Curl your fingers so that the top two joints in them are bent, and your fingers wrap down. Your fingertips should touch or be near the base of your fingers. Your fingers will look like a hook. 4. Make a fist by bending your knuckles. Your thumb can gently rest against your index (pointing) finger. 5. Unwind your fingers slightly so that your fingertips can touch the base of your palm. Your thumb can rest against your index finger. 6. Move back to your starting position, with your fingers and thumb pointing up. 7. Repeat the series of motions 8 to 12 times. Towel squeeze    1. Place a small towel roll on a table. 2. With your palm facing down, grab the towel and squeeze it for about 6 seconds. Then slowly straighten your fingers to release the towel. 3. Repeat 8 to 12 times. Towel grab    1. Fold a small towel in half, and lay it flat on a table.   2. Put your hand flat on the towel, palm down. Grab the towel, and scrunch it toward you until your hand is in a fist.  3. Slowly straighten your fingers to push the towel back so it is flat on the table again. 4. Repeat 8 to 12 times. Follow-up care is a key part of your treatment and safety. Be sure to make and go to all appointments, and call your doctor if you are having problems. It's also a good idea to know your test results and keep a list of the medicines you take. Where can you learn more? Go to http://jaja-ammon.info/. Enter 0498 [id number] in the search box to learn more about \"Finger Sprain: Rehab Exercises. \"  Current as of: March 21, 2017  Content Version: 11.4  © 6194-5256 Healthwise[employer]. Care instructions adapted under license by [employer] (which disclaims liability or warranty for this information). If you have questions about a medical condition or this instruction, always ask your healthcare professional. [address] any warranty or liability for your use of this information.

## 2018-04-03 NOTE — ED PROVIDER NOTES
HPI Comments: 62 y.o. female with past medical history significant for hyperlipidemia, Sicca syndrome, Sjogren's disease, and emphysema who presents with chief complaint of left index finger pain. Patient reports yesterday while weed eating, the cord of the weed eater struck her left index finger. Patient complains of small, painful abrasion to left index finger with swelling and bruising. Patient describes pain as initially throbbing, now burning. No other complaints of injuries. There are no other acute medical concerns at this time. Social hx: +tobacco  PCP: Izaiah Butcher MD    Note written by Rosa Isela Iyer. Harjinder Leggett, as dictated by Faheem Hogan MD 9:16 AM      The history is provided by the patient.         Past Medical History:   Diagnosis Date    Acid indigestion     Alopecia     Chest pain     Depression 3/2/2011    Diverticulosis of colon     Dizziness     Emphysema of lung (HCC)     on CXR    External hemorrhoid     Family history of esophageal cancer     H. pylori infection     Hyperlipidemia     Hyperlipidemia     Inflammatory arthritis     Joint pain     Joint swelling     Lupus     Lupus     Migraines     Muscle aches     Osteopenia     DEXA 11    PUD (peptic ulcer disease)     Sicca syndrome ([address]     Sicca syndrome ([address] 2012    Sjogren's disease ([address] 2011    Sjogren's disease ([address] 2011    Sjogrens syndrome ([address]     Stiffness joints     Trouble in sleeping        Past Surgical History:   Procedure Laterality Date    BIOPSY BREAST  2011    Benign    HX  SECTION      x 3         Family History:   Problem Relation Age of Onset    Cancer Mother 54     breast    Cancer Father      throat,stomach       Social History     Social History    Marital status:      Spouse name: Anand    Number of children: 3    Years of education: 8     Occupational History    unemployed      Social History Main Topics    Smoking status: Current Some Day Smoker     Packs/day: 0.50     Years: 35.00     Types: Cigarettes    Smokeless tobacco: Never Used      Comment: 4-6 cigarettes per day    Alcohol use No    Drug use: No    Sexual activity: Yes     Partners: Male      Comment:  occasional get together     Other Topics Concern     Service No    Blood Transfusions No    Caffeine Concern No    Seat Belt Yes     sometimes    Self-Exams Yes     Social History Narrative         ALLERGIES: Mobic [meloxicam]; Codeine [codeine]; and Voltaren [diclofenac sodium]    Review of Systems   Musculoskeletal:        + left index finger pain and swelling. Skin: Positive for wound (abrasion to left index finger). All other systems reviewed and are negative. Vitals:    04/03/18 0834   BP: 146/79   Pulse: 78   Resp: 18   Temp: 97.4 °F (36.3 °C)   SpO2: 98%   Weight: 50.3 kg (110 lb 12.8 oz)   Height: 5' 6\" (1.676 m)            Physical Exam   Constitutional: She is oriented to person, place, and time. She appears well-developed and well-nourished. No distress. HENT:   Head: Normocephalic and atraumatic. Eyes: Conjunctivae are normal. No scleral icterus. Neck: Neck supple. No tracheal deviation present. Cardiovascular: Normal rate, regular rhythm, normal heart sounds and intact distal pulses. Exam reveals no gallop and no friction rub. No murmur heard. Pulmonary/Chest: Effort normal and breath sounds normal. She has no wheezes. She has no rales. Abdominal: Soft. She exhibits no distension. There is no tenderness. There is no rebound and no guarding. Musculoskeletal: She exhibits no edema. Left hand: She exhibits swelling. Small superficial abrasion and swelling to 1st proximal phalanx. Neurological: She is alert and oriented to person, place, and time. Skin: Skin is warm and dry. Abrasion noted. No rash noted. Psychiatric: She has a normal mood and affect. Nursing note and vitals reviewed.   Note written by Hayes Rosen. Aniya Hill, as dictated by Miranda Salazar MD 9:15 AM       [facility]      ED Course       Procedures    9:49 AM  Patient has probable sprain of PIP joint of left index finger. Discharged to follow up with hand surgery.

## 2018-04-05 ENCOUNTER — HOSPITAL ENCOUNTER (EMERGENCY)
Age: 58
Discharge: HOME | End: 2018-04-06
Attending: EMERGENCY MEDICINE | Admitting: EMERGENCY MEDICINE
Payer: SELF-PAY

## 2018-04-05 DIAGNOSIS — S05.01XA ABRASION OF RIGHT CORNEA, INITIAL ENCOUNTER: Primary | ICD-10-CM

## 2018-04-05 PROCEDURE — 74011000250 HC RX REV CODE- 250: Performed by: EMERGENCY MEDICINE

## 2018-04-05 PROCEDURE — 99283 EMERGENCY DEPT VISIT LOW MDM: CPT

## 2018-04-05 PROCEDURE — 74011250637 HC RX REV CODE- 250/637: Performed by: EMERGENCY MEDICINE

## 2018-04-05 RX ORDER — ERYTHROMYCIN 5 MG/G
OINTMENT OPHTHALMIC
Qty: 1 G | Refills: 0 | Status: SHIPPED | OUTPATIENT
Start: 2018-04-05 | End: 2019-06-07

## 2018-04-05 RX ORDER — OXYCODONE AND ACETAMINOPHEN 5; 325 MG/1; MG/1
1 TABLET ORAL
Qty: 10 TAB | Refills: 0 | Status: SHIPPED | OUTPATIENT
Start: 2018-04-05 | End: 2019-06-07

## 2018-04-05 RX ORDER — TETRACAINE HYDROCHLORIDE 5 MG/ML
1 SOLUTION OPHTHALMIC
Status: COMPLETED | OUTPATIENT
Start: 2018-04-05 | End: 2018-04-05

## 2018-04-05 RX ORDER — ERYTHROMYCIN 5 MG/G
OINTMENT OPHTHALMIC
Status: COMPLETED | OUTPATIENT
Start: 2018-04-05 | End: 2018-04-05

## 2018-04-05 RX ADMIN — TETRACAINE HYDROCHLORIDE 1 DROP: 5 SOLUTION OPHTHALMIC at 22:58

## 2018-04-05 RX ADMIN — ERYTHROMYCIN: 5 OINTMENT OPHTHALMIC at 23:51

## 2018-04-05 RX ADMIN — FLUORESCEIN SODIUM 1 STRIP: 1 STRIP OPHTHALMIC at 22:58

## 2018-04-05 NOTE — Clinical Note
Erythromycin eye ointment: apply 1 cmn ribbon to right inner lower eyelid every 6 hours for 1 week. Use cool compresses on your eye for comfort. Use preservative free saline drops for comfort. Return to ER for any fever, chills, worsening of pain, wor sening of vision, vision loss, headache.

## 2018-04-06 VITALS
WEIGHT: 114 LBS | SYSTOLIC BLOOD PRESSURE: 131 MMHG | DIASTOLIC BLOOD PRESSURE: 72 MMHG | RESPIRATION RATE: 16 BRPM | BODY MASS INDEX: 18.4 KG/M2 | OXYGEN SATURATION: 95 % | TEMPERATURE: 97.7 F

## 2018-04-06 NOTE — DISCHARGE INSTRUCTIONS
Corneal Scratches: Care Instructions  Your Care Instructions    The cornea is the clear surface that covers the front of the eye. When a speck of dirt, a wood chip, an insect, or another object flies into your eye, it can cause a painful scratch on the cornea. Wearing contact lenses too long or rubbing your eyes can also scratch the cornea. Small scratches usually heal in a day or two. Deeper scratches may take longer. If you have had a foreign object removed from your eye or you have a corneal scratch, you will need to watch for infection and vision problems while your eye heals. Follow-up care is a key part of your treatment and safety. Be sure to make and go to all appointments, and call your doctor if you are having problems. It's also a good idea to know your test results and keep a list of the medicines you take. How can you care for yourself at home? · The doctor probably used a medicine during your exam to numb your eye. When it wears off in 30 to 60 minutes, your eye pain may come back. Take pain medicines exactly as directed. ¨ If the doctor gave you a prescription medicine for pain, take it as prescribed. ¨ If you are not taking a prescription pain medicine, ask your doctor if you can take an over-the-counter medicine. ¨ Do not take two or more pain medicines at the same time unless the doctor told you to. Many pain medicines have acetaminophen, which is Tylenol. Too much acetaminophen (Tylenol) can be harmful. · Do not rub your injured eye. Rubbing can make it worse. · Use the prescribed eyedrops or ointment as directed. Be sure the dropper or bottle tip is clean. To put in eyedrops or ointment:  ¨ Tilt your head back, and pull your lower eyelid down with one finger. ¨ Drop or squirt the medicine inside the lower lid. ¨ Close your eye for 30 to 60 seconds to let the drops or ointment move around. ¨ Do not touch the ointment or dropper tip to your eyelashes or any other surface.   · Do not use your contact lens in your hurt eye until your doctor says you can. Also, do not wear eye makeup until your eye has healed. · Do not drive if you have blurred vision. · Bright light may hurt. Sunglasses can help. · To prevent eye injuries in the future, wear safety glasses or goggles when you work with machines or tools, mow the lawn, or ride a bike or motorcycle. When should you call for help? Call your doctor now or seek immediate medical care if:  ? · You have signs of an eye infection, such as:  ¨ Pus or thick discharge coming from the eye. ¨ Redness or swelling around the eye. ¨ A fever. ? · You have new or worse eye pain. ? · You have vision changes. ? · It feels like there is something in your eye. ? · Light hurts your eye. ? Watch closely for changes in your health, and be sure to contact your doctor if:  ? · You do not get better as expected. Where can you learn more? Go to http://jaja-ammon.info/. Enter V086 in the search box to learn more about \"Corneal Scratches: Care Instructions. \"  Current as of: March 3, 2017  Content Version: 11.4  © 5724-4506 [employer]. Care instructions adapted under license by [employer] (which disclaims liability or warranty for this information). If you have questions about a medical condition or this instruction, always ask your healthcare professional. [place] [address] any warranty or liability for your use of this information.

## 2018-04-06 NOTE — ED TRIAGE NOTES
TRIAGE NOTE:  Patient arrives with c/o \"I had baking grease pop into my right eye, I flushed it, and when I blink it really hurts\".

## 2018-04-06 NOTE — ED PROVIDER NOTES
HPI Comments: 62 y.o. female with past medical history significant for Lupus, Sjogren's disease, emphysema, Arthritis who presents from home with chief complaint of right eye pain. Pt reports while cooking dinner tonight approximately 2 hours ago holt grease splashed up into her right eye. Pt c/o pain to her right eye (lateral) accompanied by blurred vision, pain when blinking and pain with movement of eye. She rinsed her eye with water without relief. She has not taken nor applied any medications. Pt denies light sensitivity. No other injuries. There are no other acute medical concerns at this time. PCP: Dalila Roberts MD    Note written by Clara Campos, as dictated by KALA Gorman 10:10 PM    Patient is a 62 y.o. female presenting with eye pain. The history is provided by the patient. No  was used. Eye Pain    This is a new problem. The current episode started 1 to 2 hours ago. The problem occurs constantly. The problem has not changed since onset. The right eye is affected. The pain is at a severity of 6/10. The pain is moderate. There is no history of trauma to the eye. There is no known exposure to pink eye. She does not wear contacts. Associated symptoms include blurred vision, foreign body sensation and pain (right). Pertinent negatives include no numbness, no decreased vision, no discharge, no double vision, no photophobia, no eye redness, no nausea, no vomiting, no tingling, no weakness, no itching, no fever, no blindness and no dizziness. She has tried water for the symptoms. The treatment provided no relief.         Past Medical History:   Diagnosis Date    Acid indigestion     Alopecia     Chest pain     Depression 3/2/2011    Diverticulosis of colon     Dizziness     Emphysema of lung (HCC)     on CXR    External hemorrhoid     Family history of esophageal cancer     H. pylori infection     Hyperlipidemia     Hyperlipidemia     Inflammatory arthritis     Joint pain     Joint swelling     Lupus     Lupus     Migraines     Muscle aches     Osteopenia     DEXA 11    PUD (peptic ulcer disease)     Sicca syndrome ([address]     Sicca syndrome ([address] 2012    Sjogren's disease ([address] 2011    Sjogren's disease ([address] 2011    Sjogrens syndrome ([address]     Stiffness joints     Trouble in sleeping        Past Surgical History:   Procedure Laterality Date    BIOPSY BREAST  2011    Benign    HX  SECTION      x 3         Family History:   Problem Relation Age of Onset    Cancer Mother 54     breast    Cancer Father      throat,stomach       Social History     Social History    Marital status:      Spouse name: Seaman Ends Number of children: 1    Years of education: 8     Occupational History    unemployed      Social History Main Topics    Smoking status: Current Some Day Smoker     Packs/day: 0.50     Years: 35.00     Types: Cigarettes    Smokeless tobacco: Never Used      Comment: 4-6 cigarettes per day    Alcohol use No    Drug use: No    Sexual activity: Yes     Partners: Male      Comment:  occasional get together     Other Topics Concern     Service No    Blood Transfusions No    Caffeine Concern No    Seat Belt Yes     sometimes    Self-Exams Yes     Social History Narrative         ALLERGIES: Mobic [meloxicam]; Codeine [codeine]; and Voltaren [diclofenac sodium]    Review of Systems   Constitutional: Negative for chills and fever. HENT: Negative for congestion and rhinorrhea. Eyes: Positive for blurred vision, pain (right) and visual disturbance (right). Negative for blindness, double vision, photophobia, discharge and redness. Respiratory: Negative for cough and shortness of breath. Cardiovascular: Negative for chest pain. Gastrointestinal: Negative for abdominal pain, nausea and vomiting. Genitourinary: Negative for difficulty urinating and dysuria.    Musculoskeletal: Negative for back pain and neck pain. Skin: Negative for color change, itching and wound. Neurological: Negative for dizziness, tingling, weakness, numbness and headaches. Vitals:    04/05/18 2139   BP: 135/71   Resp: 18   Temp: 97.5 °F (36.4 °C)   SpO2: 96%   Weight: 51.7 kg (114 lb)            Physical Exam   Constitutional: She is oriented to person, place, and time. She appears well-developed and well-nourished. No distress. HENT:   Head: Normocephalic and atraumatic. Eyes: Conjunctivae, EOM and lids are normal. Pupils are equal, round, and reactive to light. Lids are everted and swept, no foreign bodies found. Right eye exhibits no chemosis, no discharge, no exudate and no hordeolum. No foreign body present in the right eye. Left eye exhibits no chemosis, no discharge, no exudate and no hordeolum. No foreign body present in the left eye. Right conjunctiva is not injected. Right conjunctiva has no hemorrhage. Left conjunctiva is not injected. Left conjunctiva has no hemorrhage. No scleral icterus. Right Eye:  Pupil equal and reactive. EOM fully intact and painless. No erythema or swelling eyelids. no soft tissue swelling around eye. No redness, warmth or cellulitis. Slit lamp:  Small abrasion noted. No ulcerations  Anterior chamber, deep, clear, and quiet, no cells or flares. No hyphema. No foreign body noted. Visual acuity uncorrected: L:20/20, Right:20/25. Neck: Normal range of motion. Neck supple. Cardiovascular: Normal rate and regular rhythm. Pulmonary/Chest: Effort normal and breath sounds normal. No respiratory distress. Musculoskeletal: Normal range of motion. Neurological: She is alert and oriented to person, place, and time. Skin: Skin is warm and dry. No erythema. Psychiatric: She has a normal mood and affect. Her behavior is normal. Judgment and thought content normal.   Nursing note and vitals reviewed.        MDM  Number of Diagnoses or Management Options  Abrasion of right cornea, initial encounter:   Diagnosis management comments: 63-year-old female presenting to the emergency room for evaluation of right eye pain after/increase in her eye. Visual acuity is noted between 25 and in the right eye. Slit-lamp exam shows a small corneal abrasion. Chambers deep and quiet. There is no ulcerations. He is nontoxic-appearing. Patient feeling better after tetracaine. I will discharge with cool compresses, pain medication erythromycin eye ointment for one week with ophthalmology followup    Standard narcotic and sedating medication warnings given  Patient's results have been reviewed with them. Patient and/or family have verbally conveyed their understanding and agreement of the patient's signs, symptoms, diagnosis, treatment and prognosis and additionally agree to follow up as recommended or return to the Emergency Room should their condition change prior to follow-up. Discharge instructions have also been provided to the patient with some educational information regarding their diagnosis as well a list of reasons why they would want to return to the ER prior to their follow-up appointment should their condition change. Amount and/or Complexity of Data Reviewed  Discuss the patient with other providers: yes (ER attending-Tereza)    Patient Progress  Patient progress: stable        ED Course       Procedures    Pt case including HPI, PE, and all available lab and radiology results has been discussed with attending physician. Opportunity to evaluate patient has been provided to ER attending. Discharge and prescription plan has been agreed upon.

## 2018-10-11 ENCOUNTER — HOSPITAL ENCOUNTER (EMERGENCY)
Age: 58
Discharge: LEFT BEFORE DISCHARGE | End: 2018-10-11
Attending: EMERGENCY MEDICINE
Payer: SELF-PAY

## 2018-10-11 VITALS
RESPIRATION RATE: 16 BRPM | BODY MASS INDEX: 18.67 KG/M2 | HEART RATE: 95 BPM | WEIGHT: 116.18 LBS | HEIGHT: 66 IN | DIASTOLIC BLOOD PRESSURE: 77 MMHG | TEMPERATURE: 97.4 F | OXYGEN SATURATION: 94 % | SYSTOLIC BLOOD PRESSURE: 146 MMHG

## 2018-10-11 PROCEDURE — 75810000275 HC EMERGENCY DEPT VISIT NO LEVEL OF CARE

## 2018-10-11 NOTE — ED TRIAGE NOTES
Pt to ED for c/o productive cough with yellow/green mucous for the past two weeks. Pt reports pleuritic pain in mid upper chest and between shoulder blades when coughing. Has taken mucinex and robitussin cough with no relief.

## 2018-10-11 NOTE — ED PROVIDER NOTES
HPI Comments: 62 y.o. female {PAST MEDICAL HISTORY:97826} who presents from *** with chief complaint of ***. There are no other acute medical concerns at this time. Social hx:  
Significant FMHx: *** PCP: Aren Childers MD 
 
 
  
 
Past Medical History:  
Diagnosis Date  Acid indigestion  Alopecia  Chest pain  Depression 3/2/2011  Diverticulosis of colon  Dizziness  Emphysema of lung ([address] on CXR  External hemorrhoid  Family history of esophageal cancer   
 H. pylori infection  Hyperlipidemia  Hyperlipidemia  Inflammatory arthritis  Joint pain  Joint swelling  Lupus  Lupus  Migraines  Muscle aches  Osteopenia DEXA 11  PUD (peptic ulcer disease)  Sicca syndrome ([address]  Sicca syndrome ([address] 2012  Sjogren's disease ([address] 2011  Sjogren's disease ([address] 2011  Sjogrens syndrome ([address]  Stiffness joints  Trouble in sleeping Past Surgical History:  
Procedure Laterality Date [facility] [place] BIOPSY BREAST  2011 Benign  HX  SECTION    
 x 3 Family History:  
Problem Relation Age of Onset  Cancer Mother 54  
  breast  
 Cancer Father   
  throat,stomach Social History Social History  Marital status:  Spouse name: Chandana Baum  Number of children: 3  
 Years of education: 8 Occupational History  unemployed Social History Main Topics  Smoking status: Current Some Day Smoker Packs/day: 0.50 Years: 35.00 Types: Cigarettes  Smokeless tobacco: Never Used Comment: 4-6 cigarettes per day  Alcohol use No  
 Drug use: No  
 Sexual activity: Yes  
  Partners: Male Comment:  occasional get together Other Topics Concern   Service No  
 Blood Transfusions No  
 Caffeine Concern No  
 Seat Belt Yes  
  sometimes  Self-Exams Yes Social History Narrative ALLERGIES: Mobic [meloxicam]; Codeine [codeine]; and Voltaren [diclofenac sodium] Review of Systems Vitals:  
 10/11/18 1349 BP: 146/77 Pulse: 95 Resp: 16 Temp: 97.4 °F (36.3 °C) SpO2: 94% Weight: 52.7 kg (116 lb 2.9 oz) Height: 5' 6\" (1.676 m) Physical Exam  
 
[facility] 
 
 
ED Course Procedures

## 2018-11-19 ENCOUNTER — TELEPHONE (OUTPATIENT)
Dept: INTERNAL MEDICINE CLINIC | Age: 58
End: 2018-11-19

## 2018-11-19 NOTE — TELEPHONE ENCOUNTER
Pt would like a return call. Pt states that she needs a refill for medication for raynaud's disease. Pt was asked for name of medication pt did not know name of medication, so she was asked to contact her pharmacy to see if they had the information she states that they do not have it and she does not have any pill bottles to get name of medication. Pt states that she is getting ready to go out of town and really needs medication.  She can be reached best at [phone]

## 2018-11-19 NOTE — TELEPHONE ENCOUNTER
Called pt and asked pt if she had the name of the medication that she needed refilled. She stated she still didn't know. She also stated her rheum. - Dr. Kori Shipley was the one who prescribed it and that her retired. I informed her that she could be seen by one of his colleges and they should be able to refill the meds. She also hasnt been seen since  in this office. Has an upcoming appt. At the end of this month.

## 2019-06-07 ENCOUNTER — APPOINTMENT (OUTPATIENT)
Dept: GENERAL RADIOLOGY | Age: 59
End: 2019-06-07
Attending: NURSE PRACTITIONER
Payer: SELF-PAY

## 2019-06-07 ENCOUNTER — HOSPITAL ENCOUNTER (EMERGENCY)
Age: 59
Discharge: HOME | End: 2019-06-07
Attending: EMERGENCY MEDICINE | Admitting: EMERGENCY MEDICINE
Payer: SELF-PAY

## 2019-06-07 VITALS
HEART RATE: 72 BPM | SYSTOLIC BLOOD PRESSURE: 157 MMHG | TEMPERATURE: 97.7 F | RESPIRATION RATE: 18 BRPM | OXYGEN SATURATION: 97 % | DIASTOLIC BLOOD PRESSURE: 73 MMHG

## 2019-06-07 DIAGNOSIS — J44.1 ACUTE EXACERBATION OF CHRONIC OBSTRUCTIVE PULMONARY DISEASE (COPD) (HCC): Primary | ICD-10-CM

## 2019-06-07 LAB
ALBUMIN SERPL-MCNC: 3.6 G/DL (ref 3.5–5)
ALBUMIN/GLOB SERPL: 1.1 {RATIO} (ref 1.1–2.2)
ALP SERPL-CCNC: 76 U/L (ref 45–117)
ALT SERPL-CCNC: 13 U/L (ref 12–78)
ANION GAP SERPL CALC-SCNC: 0 MMOL/L (ref 5–15)
AST SERPL-CCNC: 9 U/L (ref 15–37)
BASOPHILS # BLD: 0 K/UL (ref 0–0.1)
BASOPHILS NFR BLD: 1 % (ref 0–1)
BILIRUB SERPL-MCNC: 0.2 MG/DL (ref 0.2–1)
BUN SERPL-MCNC: 11 MG/DL (ref 6–20)
BUN/CREAT SERPL: 28 (ref 12–20)
CALCIUM SERPL-MCNC: 9 MG/DL (ref 8.5–10.1)
CHLORIDE SERPL-SCNC: 107 MMOL/L (ref 97–108)
CO2 SERPL-SCNC: 31 MMOL/L (ref 21–32)
COMMENT, HOLDF: NORMAL
CREAT SERPL-MCNC: 0.39 MG/DL (ref 0.55–1.02)
DIFFERENTIAL METHOD BLD: NORMAL
EOSINOPHIL # BLD: 0.1 K/UL (ref 0–0.4)
EOSINOPHIL NFR BLD: 3 % (ref 0–7)
ERYTHROCYTE [DISTWIDTH] IN BLOOD BY AUTOMATED COUNT: 13.6 % (ref 11.5–14.5)
GLOBULIN SER CALC-MCNC: 3.3 G/DL (ref 2–4)
GLUCOSE SERPL-MCNC: 82 MG/DL (ref 65–100)
HCT VFR BLD AUTO: 44.5 % (ref 35–47)
HGB BLD-MCNC: 13.7 G/DL (ref 11.5–16)
IMM GRANULOCYTES # BLD AUTO: 0 K/UL (ref 0–0.04)
IMM GRANULOCYTES NFR BLD AUTO: 0 % (ref 0–0.5)
LYMPHOCYTES # BLD: 0.8 K/UL (ref 0.8–3.5)
LYMPHOCYTES NFR BLD: 17 % (ref 12–49)
MCH RBC QN AUTO: 28.9 PG (ref 26–34)
MCHC RBC AUTO-ENTMCNC: 30.8 G/DL (ref 30–36.5)
MCV RBC AUTO: 93.9 FL (ref 80–99)
MONOCYTES # BLD: 0.6 K/UL (ref 0–1)
MONOCYTES NFR BLD: 13 % (ref 5–13)
NEUTS SEG # BLD: 3.1 K/UL (ref 1.8–8)
NEUTS SEG NFR BLD: 66 % (ref 32–75)
NRBC # BLD: 0 K/UL (ref 0–0.01)
NRBC BLD-RTO: 0 PER 100 WBC
PLATELET # BLD AUTO: 235 K/UL (ref 150–400)
PMV BLD AUTO: 9 FL (ref 8.9–12.9)
POTASSIUM SERPL-SCNC: 4.2 MMOL/L (ref 3.5–5.1)
PROT SERPL-MCNC: 6.9 G/DL (ref 6.4–8.2)
RBC # BLD AUTO: 4.74 M/UL (ref 3.8–5.2)
RBC MORPH BLD: NORMAL
SAMPLES BEING HELD,HOLD: NORMAL
SODIUM SERPL-SCNC: 138 MMOL/L (ref 136–145)
WBC # BLD AUTO: 4.6 K/UL (ref 3.6–11)

## 2019-06-07 PROCEDURE — 74011250636 HC RX REV CODE- 250/636: Performed by: NURSE PRACTITIONER

## 2019-06-07 PROCEDURE — 96361 HYDRATE IV INFUSION ADD-ON: CPT

## 2019-06-07 PROCEDURE — 36415 COLL VENOUS BLD VENIPUNCTURE: CPT

## 2019-06-07 PROCEDURE — 80053 COMPREHEN METABOLIC PANEL: CPT

## 2019-06-07 PROCEDURE — 96375 TX/PRO/DX INJ NEW DRUG ADDON: CPT

## 2019-06-07 PROCEDURE — 71046 X-RAY EXAM CHEST 2 VIEWS: CPT

## 2019-06-07 PROCEDURE — 96374 THER/PROPH/DIAG INJ IV PUSH: CPT

## 2019-06-07 PROCEDURE — 85025 COMPLETE CBC W/AUTO DIFF WBC: CPT

## 2019-06-07 PROCEDURE — 99282 EMERGENCY DEPT VISIT SF MDM: CPT

## 2019-06-07 RX ORDER — PREDNISONE 10 MG/1
TABLET ORAL
Qty: 39 TAB | Refills: 0 | OUTPATIENT
Start: 2019-06-07 | End: 2020-03-31

## 2019-06-07 RX ORDER — ALBUTEROL SULFATE 90 UG/1
2 AEROSOL, METERED RESPIRATORY (INHALATION)
Qty: 1 INHALER | Refills: 0 | Status: SHIPPED | OUTPATIENT
Start: 2019-06-07 | End: 2020-03-31

## 2019-06-07 RX ORDER — AMOXICILLIN AND CLAVULANATE POTASSIUM 875; 125 MG/1; MG/1
1 TABLET, FILM COATED ORAL 2 TIMES DAILY
Qty: 14 TAB | Refills: 0 | Status: SHIPPED | OUTPATIENT
Start: 2019-06-07 | End: 2019-06-14

## 2019-06-07 RX ORDER — ONDANSETRON 2 MG/ML
4 INJECTION INTRAMUSCULAR; INTRAVENOUS
Status: COMPLETED | OUTPATIENT
Start: 2019-06-07 | End: 2019-06-07

## 2019-06-07 RX ADMIN — SODIUM CHLORIDE 1000 ML: 900 INJECTION, SOLUTION INTRAVENOUS at 13:42

## 2019-06-07 RX ADMIN — METHYLPREDNISOLONE SODIUM SUCCINATE 125 MG: 40 INJECTION, POWDER, FOR SOLUTION INTRAMUSCULAR; INTRAVENOUS at 14:34

## 2019-06-07 RX ADMIN — ONDANSETRON 4 MG: 2 INJECTION INTRAMUSCULAR; INTRAVENOUS at 13:43

## 2019-06-07 NOTE — ED TRIAGE NOTES
Patient presents from home with complaints of cough for the last 3 weeks. Patient reports that she is coughing up \"dark brown stuff\".   Patient also reports she has been getting headaches from the cough as well

## 2019-06-07 NOTE — ED PROVIDER NOTES
Initial Complaint: Cough    Started: 3 weeks ago, worsened 2 days ago    Endorses: Started as allergic symptoms, running nose. Etc. Feels like something is \"hung in the chest\". Worse at night when laying down. Headache 2/2 cough, fatigue, nausea, occasional SOB. Not eating or drinking well. Denies: Fevers, chill, vomiting. Tobacco: 1 PPD  Marijuana or other recreational: No  Alcohol: No    Has tried \"all the OTC medications\": Robitussin did not help yesterday. Vicks allergy initially helped not it is not. Made better: nothing  Made worse: laying down, nighttime    No further complaints. Past Medical History:  No date: Acid indigestion  No date: Alopecia  No date: Chest pain  3/2/2011: Depression  No date: Diverticulosis of colon  No date: Dizziness  No date: Emphysema of lung (HCC)      Comment:  on CXR  No date: External hemorrhoid  No date: Family history of esophageal cancer  No date: H. pylori infection  No date: Hyperlipidemia  No date: Hyperlipidemia  No date: Inflammatory arthritis  No date: Joint pain  No date: Joint swelling  No date: Lupus ([facility]  No date: Lupus ([facility]  No date: Migraines  No date: Muscle aches  No date: Osteopenia      Comment:  DEXA 11  No date: PUD (peptic ulcer disease)  No date: Sicca syndrome ([address]  2012: Sicca syndrome ([address]  2011: Sjogren's disease ([facility]  2011: Sjogren's disease ([facility]  No date: Sjogrens syndrome ([facility]  No date: Stiffness joints  No date: Trouble in sleeping  Past Surgical History:  : BIOPSY BREAST      Comment:  Benign  No date: HX  SECTION      Comment:  x 3  Reviewed      Primary care provider: Joss Rojo MD      The history is provided by the patient. No  was used.       Past Medical History:   Diagnosis Date    Acid indigestion     Alopecia     Chest pain     Depression 3/2/2011    Diverticulosis of colon     Dizziness     Emphysema of lung (HCC)     on CXR    External hemorrhoid     Family history of esophageal cancer     H. pylori infection     Hyperlipidemia     Hyperlipidemia     Inflammatory arthritis     Joint pain     Joint swelling     Lupus ([address]     Lupus ([address]     Migraines     Muscle aches     Osteopenia     DEXA 11    PUD (peptic ulcer disease)     Sicca syndrome ([address]     Sicca syndrome ([address] 2012    Sjogren's disease ([address] 2011    Sjogren's disease ([address] 2011    Sjogrens syndrome ([address]     Stiffness joints     Trouble in sleeping      Past Surgical History:   Procedure Laterality Date    BIOPSY BREAST  2011    Benign    HX  SECTION      x 3     Family History:   Problem Relation Age of Onset    Cancer Mother 54        breast    Cancer Father         throat,stomach     Social History     Socioeconomic History    Marital status:      Spouse name: Anand    Number of children: 3    Years of education: 8    Highest education level: Not on file   Occupational History    Occupation: unemployed   Social Needs    Financial resource strain: Not on file    Food insecurity:     Worry: Not on file     Inability: Not on file   Vine needs:     Medical: Not on file     Non-medical: Not on file   Tobacco Use    Smoking status: Current Some Day Smoker     Packs/day: 0.50     Years: 35.00     Pack years: 17.50     Types: Cigarettes    Smokeless tobacco: Never Used    Tobacco comment: 4-6 cigarettes per day   Substance and Sexual Activity    Alcohol use: No     Alcohol/week: 0.0 oz    Drug use: No    Sexual activity: Yes     Partners: Male     Comment:  occasional get together   Lifestyle    Physical activity:     Days per week: Not on file     Minutes per session: Not on file    Stress: Not on file   Relationships    Social connections:     Talks on phone: Not on file     Gets together: Not on file     Attends [employer] service: Not on file     Active member of club or organization: Not on file Attends meetings of clubs or organizations: Not on file     Relationship status: Not on file    Intimate partner violence:     Fear of current or ex partner: Not on file     Emotionally abused: Not on file     Physically abused: Not on file     Forced sexual activity: Not on file   Other Topics Concern     Service No    Blood Transfusions No    Caffeine Concern No    Occupational Exposure Not Asked    Hobby Hazards Not Asked    Sleep Concern Not Asked    Stress Concern Not Asked    Weight Concern Not Asked    Special Diet Not Asked    Back Care Not Asked    Exercise Not Asked    Bike Helmet Not Asked    Seat Belt Yes     Comment: sometimes    Self-Exams Yes   Social History Narrative    Not on file     ALLERGIES: Mobic [meloxicam]; Codeine [codeine]; and Voltaren [diclofenac sodium]    Review of Systems   Constitutional: Positive for activity change, appetite change, chills and fever. HENT: Positive for congestion and rhinorrhea. Respiratory: Positive for cough and shortness of breath. Cardiovascular: Negative for chest pain. Gastrointestinal: Positive for nausea. Genitourinary: Negative for dysuria and flank pain. Vitals:    06/07/19 1035 06/07/19 1036 06/07/19 1038   BP:  155/89    Pulse: (!) 118 (!) 101    Resp:  18    Temp:  98.1 °F (36.7 °C)    SpO2: 93% 95% 95%          Physical Exam   Constitutional: She is oriented to person, place, and time. She appears well-nourished. She appears cachectic. Thinning around the sternocleidomastoid   HENT:   Head: Normocephalic and atraumatic. Neck: Normal range of motion. Neck supple. Cardiovascular: Regular rhythm, normal heart sounds and intact distal pulses. Tachycardia present. Exam reveals no gallop. No murmur heard. Pulmonary/Chest: Effort normal. No stridor. No respiratory distress. She has no decreased breath sounds. She has no wheezes. She has rales (fine, all fields). She exhibits no tenderness. Abdominal: Soft. Normal appearance and bowel sounds are normal. There is no tenderness. There is no rigidity, no rebound, no guarding and no CVA tenderness. Musculoskeletal: Normal range of motion. Neurological: She is alert and oriented to person, place, and time. Skin: Skin is warm and dry. No erythema. Psychiatric: She has a normal mood and affect. Her behavior is normal. Judgment and thought content normal.   Nursing note and vitals reviewed. [facility]     Procedures    Assessment & Plan:     Orders Placed This Encounter    XR CHEST PA LAT    CBC WITH AUTOMATED DIFF    METABOLIC PANEL, COMPREHENSIVE    Hold Sample    sodium chloride 0.9 % bolus infusion 1,000 mL    ondansetron (ZOFRAN) injection 4 mg       Discussed with Tamika José DO,ED Provider    Gerard Perry NP  06/07/19  11:34 AM    CXR consistent with COPD exacerbation. Augmentin & Pred taper. PCP Follow-up. Discussed return precautions. TOBACCO COUNSELING:  Upon evaluation, pt expressed that they are a current tobacco user. Pt has been counseled on the dangers of smoking and was encouraged to quit as soon as possible in order to decrease further risks to their health. Pt has conveyed their understanding of the risks involved should they continue to use tobacco products. LABORATORY TESTS:  Labs Reviewed   METABOLIC PANEL, COMPREHENSIVE - Abnormal; Notable for the following components:       Result Value    Anion gap 0 (*)     Creatinine 0.39 (*)     BUN/Creatinine ratio 28 (*)     AST (SGOT) 9 (*)     All other components within normal limits   CBC WITH AUTOMATED DIFF   SAMPLES BEING HELD       IMAGING RESULTS:  Xr Chest Pa Lat    Result Date: 6/7/2019  Indication:  Cough for 3 weeks. known COPD Exam: PA and lateral views of the chest. Direct comparison is made to prior CXR dated November 2017. Findings: Cardiomediastinal silhouette is within normal limits. Lungs are hyperinflated and there are biapical emphysematous changes.  Lungs are otherwise clear bilaterally. Pleural spaces are normal. Osseous structures are intact. IMPRESSION: Hyperinflated, emphysematous lungs. MEDICATIONS GIVEN:  Medications   sodium chloride 0.9 % bolus infusion 1,000 mL (has no administration in time range)   ondansetron (ZOFRAN) injection 4 mg (has no administration in time range)       IMPRESSION:  1. Acute exacerbation of chronic obstructive pulmonary disease (COPD) ([facility]        PLAN:  1. Current Discharge Medication List      START taking these medications    Details   amoxicillin-clavulanate (AUGMENTIN) 875-125 mg per tablet Take 1 Tab by mouth two (2) times a day for 7 days. Qty: 14 Tab, Refills: 0      predniSONE (DELTASONE) 10 mg tablet Take with breakfast daily on a taper: 6 tabs PO for 3 days then 4 tabs PO for 3 days then 2 tabs PO for 3 days then 1 tab PO for 3 days  Qty: 39 Tab, Refills: 0      albuterol (PROAIR HFA) 90 mcg/actuation inhaler Take 2 Puffs by inhalation every four (4) hours as needed for Wheezing or Shortness of Breath. Indications: chronic obstructive lung disease  Qty: 1 Inhaler, Refills: 0           2. Follow-up Information     Follow up With Specialties Details Why Jose A Naranjo MD Pediatrics, Internal Medicine Schedule an appointment as soon as possible for a visit As needed [id number] [facility]  [phone]      [facility] [place] [address], [facility] DEP Emergency Medicine  As needed, If symptoms worsen [address] [facility]  [phone]        3.      Return to ED for new or worsening symptoms       Tej Varela NP

## 2019-06-07 NOTE — DISCHARGE INSTRUCTIONS
Thank you for allowing us to care for you today. Please follow-up with your Primary Care provider in the next 2-3 days if your symptoms do not improve. Plan for home:     Prednisone on a taper daily before lunch as directed. Augmenting twice daily for 7 days    Come back to the ER if you have worsening symptoms, fevers over 100.9, shaking chills, nausea or vomiting. Patient Education        Chronic Obstructive Pulmonary Disease (COPD): Care Instructions  Your Care Instructions    Chronic obstructive pulmonary disease (COPD) is a general term for a group of lung diseases, including emphysema and chronic bronchitis. People with COPD have decreased airflow in and out of the lungs, which makes it hard to breathe. The airways also can get clogged with thick mucus. Cigarette smoking is a major cause of COPD. Although there is no cure for COPD, you can slow its progress. Following your treatment plan and taking care of yourself can help you feel better and live longer. Follow-up care is a key part of your treatment and safety. Be sure to make and go to all appointments, and call your doctor if you are having problems. It's also a good idea to know your test results and keep a list of the medicines you take. How can you care for yourself at home?   Staying healthy    · Do not smoke. This is the most important step you can take to prevent more damage to your lungs. If you need help quitting, talk to your doctor about stop-smoking programs and medicines. These can increase your chances of quitting for good.     · Avoid colds and flu. Get a pneumococcal vaccine shot. If you have had one before, ask your doctor whether you need a second dose. Get the flu vaccine every fall. If you must be around people with colds or the flu, wash your hands often.     · Avoid secondhand smoke, air pollution, and high altitudes. Also avoid cold, dry air and hot, humid air.  Stay at home with your windows closed when air pollution is bad.    Medicines and oxygen therapy    · Take your medicines exactly as prescribed. Call your doctor if you think you are having a problem with your medicine.     · You may be taking medicines such as:  ? Bronchodilators. These help open your airways and make breathing easier. Bronchodilators are either short-acting (work for 6 to 9 hours) or long-acting (work for 24 hours). You inhale most bronchodilators, so they start to act quickly. Always carry your quick-relief inhaler with you in case you need it while you are away from home. ? Corticosteroids (prednisone, budesonide). These reduce airway inflammation. They come in pill or inhaled form. You must take these medicines every day for them to work well.     · A spacer may help you get more inhaled medicine to your lungs. Ask your doctor or pharmacist if a spacer is right for you. If it is, ask how to use it properly.     · Do not take any vitamins, over-the-counter medicine, or herbal products without talking to your doctor first.     · If your doctor prescribed antibiotics, take them as directed. Do not stop taking them just because you feel better. You need to take the full course of antibiotics.     · Oxygen therapy boosts the amount of oxygen in your blood and helps you breathe easier. Use the flow rate your doctor has recommended, and do not change it without talking to your doctor first.   Activity    · Get regular exercise. Walking is an easy way to get exercise. Start out slowly, and walk a little more each day.     · Pay attention to your breathing.  You are exercising too hard if you cannot talk while you are exercising.     · Take short rest breaks when doing household chores and other activities.     · Learn breathing methods--such as breathing through pursed lips--to help you become less short of breath.     · If your doctor has not set you up with a pulmonary rehabilitation program, talk to him or her about whether rehab is right for you. Rehab includes exercise programs, education about your disease and how to manage it, help with diet and other changes, and emotional support. Diet    · Eat regular, healthy meals. Use bronchodilators about 1 hour before you eat to make it easier to eat. Eat several small meals instead of three large ones. Drink beverages at the end of the meal. Avoid foods that are hard to chew.     · Eat foods that contain protein so that you do not lose muscle mass.     · Talk with your doctor if you gain too much weight or if you lose weight without trying.    Mental health    · Talk to your family, friends, or a therapist about your feelings. It is normal to feel frightened, angry, hopeless, helpless, and even guilty. Talking openly about bad feelings can help you cope. If these feelings last, talk to your doctor. When should you call for help? Call 911 anytime you think you may need emergency care. For example, call if:    · You have severe trouble breathing.    Call your doctor now or seek immediate medical care if:    · You have new or worse trouble breathing.     · You cough up blood.     · You have a fever.    Watch closely for changes in your health, and be sure to contact your doctor if:    · You cough more deeply or more often, especially if you notice more mucus or a change in the color of your mucus.     · You have new or worse swelling in your legs or belly.     · You are not getting better as expected. Where can you learn more? Go to http://jaja-ammon.info/. Regina Gonzalez in the search box to learn more about \"Chronic Obstructive Pulmonary Disease (COPD): Care Instructions. \"  Current as of: September 5, 2018  Content Version: 11.9  © 7367-3764 [employer]. Care instructions adapted under license by [employer] (which disclaims liability or warranty for this information).  If you have questions about a medical condition or this instruction, always ask your healthcare professional. [address] any warranty or liability for your use of this information.

## 2019-06-07 NOTE — ED NOTES
Discharge instructions given to patient by NP. Pt has been given counseling on medication use and verbalizes understanding. IV d/c. Pt ambulated off of unit in no signs of distress.

## 2019-06-11 ENCOUNTER — OFFICE VISIT (OUTPATIENT)
Dept: INTERNAL MEDICINE CLINIC | Age: 59
End: 2019-06-11

## 2019-06-11 VITALS
WEIGHT: 109.79 LBS | HEART RATE: 84 BPM | DIASTOLIC BLOOD PRESSURE: 76 MMHG | OXYGEN SATURATION: 98 % | BODY MASS INDEX: 17.64 KG/M2 | RESPIRATION RATE: 12 BRPM | TEMPERATURE: 98.1 F | HEIGHT: 66 IN | SYSTOLIC BLOOD PRESSURE: 129 MMHG

## 2019-06-11 DIAGNOSIS — F17.200 SMOKING: ICD-10-CM

## 2019-06-11 DIAGNOSIS — J30.2 SEASONAL ALLERGIC RHINITIS, UNSPECIFIED TRIGGER: ICD-10-CM

## 2019-06-11 DIAGNOSIS — J44.9 COPD WITH ASTHMA (HCC): Primary | ICD-10-CM

## 2019-06-11 DIAGNOSIS — M35.00 SJOGREN'S SYNDROME, WITH UNSPECIFIED ORGAN INVOLVEMENT (HCC): ICD-10-CM

## 2019-06-11 DIAGNOSIS — M19.90 INFLAMMATORY ARTHRITIS: ICD-10-CM

## 2019-06-11 DIAGNOSIS — G43.909 MIGRAINE WITHOUT STATUS MIGRAINOSUS, NOT INTRACTABLE, UNSPECIFIED MIGRAINE TYPE: ICD-10-CM

## 2019-06-11 RX ORDER — IPRATROPIUM BROMIDE AND ALBUTEROL SULFATE 2.5; .5 MG/3ML; MG/3ML
3 SOLUTION RESPIRATORY (INHALATION)
Qty: 1 NEBULE | Refills: 0
Start: 2019-06-11 | End: 2019-06-11

## 2019-06-11 RX ORDER — IPRATROPIUM BROMIDE AND ALBUTEROL SULFATE 2.5; .5 MG/3ML; MG/3ML
3 SOLUTION RESPIRATORY (INHALATION)
Qty: 60 NEBULE | Refills: 5 | Status: SHIPPED | OUTPATIENT
Start: 2019-06-11 | End: 2020-03-31

## 2019-06-11 RX ORDER — BUTALBITAL, ACETAMINOPHEN AND CAFFEINE 50; 325; 40 MG/1; MG/1; MG/1
1 TABLET ORAL
Qty: 20 TAB | Refills: 2 | Status: SHIPPED | OUTPATIENT
Start: 2019-06-11 | End: 2020-02-11

## 2019-06-11 RX ORDER — BUDESONIDE 0.5 MG/2ML
500 INHALANT ORAL 2 TIMES DAILY
Qty: 60 EACH | Refills: 5 | Status: SHIPPED | OUTPATIENT
Start: 2019-06-11 | End: 2020-08-07 | Stop reason: ALTCHOICE

## 2019-06-11 RX ORDER — FLUTICASONE PROPIONATE 50 MCG
2 SPRAY, SUSPENSION (ML) NASAL DAILY
Qty: 1 BOTTLE | Refills: 5 | Status: SHIPPED | OUTPATIENT
Start: 2019-06-11 | End: 2020-08-07 | Stop reason: SDUPTHER

## 2019-06-11 RX ORDER — AZITHROMYCIN 250 MG/1
TABLET, FILM COATED ORAL
Qty: 6 TAB | Refills: 0 | Status: SHIPPED | OUTPATIENT
Start: 2019-06-11 | End: 2019-06-16

## 2019-06-11 RX ORDER — LORATADINE 10 MG/1
10 TABLET ORAL DAILY
Qty: 30 TAB | Refills: 5 | Status: SHIPPED | OUTPATIENT
Start: 2019-06-11 | End: 2020-03-31

## 2019-06-11 NOTE — PROGRESS NOTES
HPI:  Presents for f/u ER visit    Seen in ER over the weekend  Dx COPD exac  Rx'd albuterol, pred, augmentin    +3 months of runny nose, scratchy throat  Then, increased sx    Very little benefit since ER    Pt feels like something stuck in the chest that does not come up. Past medical, Social, and Family history reviewed    Prior to Admission medications    Medication Sig Start Date End Date Taking? Authorizing Provider   albuterol-ipratropium (DUO-NEB) 2.5 mg-0.5 mg/3 ml nebu 3 mL by Nebulization route every six (6) hours as needed (asthma, COPD). 6/11/19  Yes Alexandrea Warner MD   albuterol-ipratropium (DUO-NEB) 2.5 mg-0.5 mg/3 ml nebu 3 mL by Nebulization route now for 1 dose. 6/11/19 6/11/19 Yes Alexandrea Warner MD   budesonide (PULMICORT) 0.5 mg/2 mL nbsp 2 mL by Nebulization route two (2) times a day. 6/11/19  Yes Alexandrea Warner MD   inhalational spacing device (MICROCHAMBER) 1 Each by Does Not Apply route as needed for Cough. 6/11/19  Yes Alexandrea Warner MD   azithromycin [facility]) 250 mg tablet Take 2 tablets today, then take 1 tablet daily 6/11/19 6/16/19 Yes Alexandrea Warner MD   butalbital-acetaminophen-caffeine (FIORICET, [facility]) -40 mg per tablet Take 1 Tab by mouth every six (6) hours as needed for Headache. 6/11/19  Yes Alexandrea Warner MD   amoxicillin-clavulanate (AUGMENTIN) 875-125 mg per tablet Take 1 Tab by mouth two (2) times a day for 7 days. 6/7/19 6/14/19 Yes Kathy ONEIL NP   predniSONE (DELTASONE) 10 mg tablet Take with breakfast daily on a taper: 6 tabs PO for 3 days then 4 tabs PO for 3 days then 2 tabs PO for 3 days then 1 tab PO for 3 days 6/7/19  Yes Kathy ONEIL NP   albuterol (PROAIR HFA) 90 mcg/actuation inhaler Take 2 Puffs by inhalation every four (4) hours as needed for Wheezing or Shortness of Breath.  Indications: chronic obstructive lung disease 6/7/19  Yes Kathy ONEIL, NP          ROS  Complete ROS reviewed and negative or stable except as noted in HPI. Physical Exam   Constitutional: She is oriented to person, place, and time. She appears well-nourished. No distress. HENT:   Head: Normocephalic and atraumatic. Mouth/Throat: Oropharynx is clear and moist. No oropharyngeal exudate. Eyes: Pupils are equal, round, and reactive to light. EOM are normal. No scleral icterus. Neck: Normal range of motion. Neck supple. No JVD present. No thyromegaly present. Cardiovascular: Normal rate, regular rhythm and normal heart sounds. Exam reveals no gallop and no friction rub. No murmur heard. Pulmonary/Chest: Effort normal. No respiratory distress. She has wheezes (end exp). She has no rales. Bronchospastic cough   Abdominal: Soft. Bowel sounds are normal. She exhibits no distension. There is no tenderness. Musculoskeletal: Normal range of motion. She exhibits no edema. Lymphadenopathy:     She has no cervical adenopathy. Neurological: She is alert and oriented to person, place, and time. She exhibits normal muscle tone. Coordination normal.   Skin: Skin is warm. No rash noted. Psychiatric: She has a normal mood and affect. Nursing note and vitals reviewed. Prior labs reviewed. Reviewed prior imaging - CXR  Reviewed ER notes      Assessment/Plan:  ?atypical organism missed by augmentin  COPD, asthma  Also, allergic rhinitis    ICD-10-CM ICD-9-CM    1. COPD with asthma ([facility] J44.9 493.20 albuterol-ipratropium (DUO-NEB) 2.5 mg-0.5 mg/3 ml nebu      ALBUTEROL IPRATROP NON-COMP      IA PRESSURIZED/NONPRESSURIZED INHALATION TREATMENT      albuterol-ipratropium (DUO-NEB) 2.5 mg-0.5 mg/3 ml nebu      budesonide (PULMICORT) 0.5 mg/2 mL nbsp      inhalational spacing device (MICROCHAMBER)      PULMONARY FUNCTION TEST      azithromycin (ZITHROMAX) 250 mg tablet   2. Smoking F17.200 305.1    3.  Migraine without status migrainosus, not intractable, unspecified migraine type G43.909 346.90 butalbital-acetaminophen-caffeine (FIORICET, ESGIC) -40 mg per tablet   4. Sjogren's syndrome, with unspecified organ involvement ([facility] M35.00 710.2    5. Inflammatory arthritis M19.90 714.9    6. Seasonal allergic rhinitis, unspecified trigger J30.2 477.9 fluticasone propionate (FLONASE) 50 mcg/actuation nasal spray      loratadine (CLARITIN) 10 mg tablet     Follow-up and Dispositions    · Return in about 3 months (around 9/11/2019), or if symptoms worsen or fail to improve, for asthma, COPD.         results and schedule of future studies reviewed with patient  reviewed diet, exercise and weight   cardiovascular risk and specific lipid/LDL goals reviewed  reviewed medications and side effects in detail  mucinex   Add atrovent neb for lower cost than MDIs  Pt to try to find a neb machine through a friend  Use spacer when does use MDI   Controller med - pulmicort neb  azithro   Complete augmentin

## 2019-06-11 NOTE — PROGRESS NOTES
Rm#15  Taking proair pt states it doesn't work   C/o leg and chest pain    Chief Complaint   Patient presents with   [facility] Follow Up     6-7-19, [facility], copd and liver issue      1. Have you been to the ER, urgent care clinic since your last visit? Hospitalized since your last visit? Yes 6-7-19, copd and liver issue, [facility]    2. Have you seen or consulted any other health care providers outside of the [address] [place] [address] since your last visit? Include any pap smears or colon screening.  No       Health Maintenance Due   Topic Date Due    Hepatitis C Screening  1960    Shingrix Vaccine Age 50> (1 of 2) 10/11/2010    DTaP/Tdap/Td series (1 - Tdap) 04/02/2013    PAP AKA CERVICAL CYTOLOGY  08/11/2014    BREAST CANCER SCRN MAMMOGRAM  01/22/2017

## 2020-02-11 DIAGNOSIS — G43.909 MIGRAINE WITHOUT STATUS MIGRAINOSUS, NOT INTRACTABLE, UNSPECIFIED MIGRAINE TYPE: ICD-10-CM

## 2020-02-11 RX ORDER — BUTALBITAL, ACETAMINOPHEN AND CAFFEINE 50; 325; 40 MG/1; MG/1; MG/1
TABLET ORAL
Qty: 20 TAB | Refills: 1 | Status: SHIPPED | OUTPATIENT
Start: 2020-02-11 | End: 2020-05-08

## 2020-03-31 ENCOUNTER — HOSPITAL ENCOUNTER (EMERGENCY)
Age: 60
Discharge: HOME | End: 2020-03-31
Attending: EMERGENCY MEDICINE
Payer: MEDICAID

## 2020-03-31 ENCOUNTER — TELEPHONE (OUTPATIENT)
Dept: INTERNAL MEDICINE CLINIC | Age: 60
End: 2020-03-31

## 2020-03-31 ENCOUNTER — APPOINTMENT (OUTPATIENT)
Dept: GENERAL RADIOLOGY | Age: 60
End: 2020-03-31
Attending: NURSE PRACTITIONER
Payer: MEDICAID

## 2020-03-31 VITALS
RESPIRATION RATE: 18 BRPM | HEART RATE: 89 BPM | DIASTOLIC BLOOD PRESSURE: 83 MMHG | OXYGEN SATURATION: 96 % | SYSTOLIC BLOOD PRESSURE: 133 MMHG | TEMPERATURE: 98 F

## 2020-03-31 DIAGNOSIS — J44.9 COPD WITH ASTHMA (HCC): ICD-10-CM

## 2020-03-31 DIAGNOSIS — J06.9 ACUTE UPPER RESPIRATORY INFECTION: Primary | ICD-10-CM

## 2020-03-31 DIAGNOSIS — J30.2 SEASONAL ALLERGIC RHINITIS, UNSPECIFIED TRIGGER: ICD-10-CM

## 2020-03-31 PROCEDURE — 71045 X-RAY EXAM CHEST 1 VIEW: CPT

## 2020-03-31 PROCEDURE — 99282 EMERGENCY DEPT VISIT SF MDM: CPT

## 2020-03-31 RX ORDER — IPRATROPIUM BROMIDE AND ALBUTEROL SULFATE 2.5; .5 MG/3ML; MG/3ML
3 SOLUTION RESPIRATORY (INHALATION)
Qty: 60 NEBULE | Refills: 0 | Status: SHIPPED | OUTPATIENT
Start: 2020-03-31 | End: 2020-06-30

## 2020-03-31 RX ORDER — BENZONATATE 100 MG/1
100 CAPSULE ORAL
Qty: 21 CAP | Refills: 0 | Status: SHIPPED | OUTPATIENT
Start: 2020-03-31 | End: 2020-04-07

## 2020-03-31 RX ORDER — LORATADINE 10 MG/1
10 TABLET ORAL DAILY
Qty: 30 TAB | Refills: 0 | Status: SHIPPED | OUTPATIENT
Start: 2020-03-31 | End: 2020-06-30

## 2020-03-31 RX ORDER — ALBUTEROL SULFATE 90 UG/1
2 AEROSOL, METERED RESPIRATORY (INHALATION)
Qty: 1 INHALER | Refills: 0 | Status: SHIPPED | OUTPATIENT
Start: 2020-03-31 | End: 2020-08-07 | Stop reason: SDUPTHER

## 2020-03-31 RX ORDER — POLYMYXIN B SULFATE AND TRIMETHOPRIM 1; 10000 MG/ML; [USP'U]/ML
1 SOLUTION OPHTHALMIC EVERY 4 HOURS
Qty: 10 ML | Refills: 0 | Status: SHIPPED | OUTPATIENT
Start: 2020-03-31 | End: 2022-03-08 | Stop reason: ALTCHOICE

## 2020-03-31 NOTE — DISCHARGE INSTRUCTIONS
Thank you for allowing us to care for you today. Please follow-up with your Primary Care provider in the next 2-3 days if your symptoms do not improve. Plan for home:     Wash face and pat dry. When washing face use a different part of the wash cloth around each eye. Instill 1-2 drops of the Polytrim eye drop into both eyes. For the first 24 hours use it every 4 hours. For the remaining 4 days use the drops every 6 hours. Fifteen minutes later you may use Opcon-A for itchy eyes. You may also use liquid tears, just wait 15 minutes after using antibiotic eye drops. If you wear contact lens stop wearing for the next 5 days and only wear your glasses. Bacterial conjunctivitis is contagious. It can easily be spread. Wash your hands before and after touching your eye or wiping your eye with a tissue. Use separate tissues for each eye. Tessalon Perles every 8 hours as needed for cough. Tylenol 1000 mg alternating with Ibuprofen 600mg every 6 hours for pain/fever control. Do NOT take ibuprofen if you are on a blood thinner! Example: 8am take Ibuprofen. 11am take tylenol. 2pm take ibuprofen. 5pm take tylenol. 8pm take ibuprofen. 11pm take tylenol. You may continue this process overnight if you have continued pain/discomfort. Albuterol inhaler: 1 to 2 puffs every 4-6 hours as needed for cough and shortness of breath. DuoNeb nebulizer treatments: Make sure you do this in the room when you are lying with at least 2 windows open while doing the treatment. The nebulizer can theoretically aerosolized the virus spreading around her home. This is why it is important that you do this in her room alone with at least 2 windows open. CONSIDER YOURSELF COVID-19 POSITIVE! If you would like testing, go online at [facility] for screening and appointment set up. Stay at home except to get medical care.  Seek medical attention if you develop worsening symptoms or new concerns such as severe shortness of breath, chest pain, etc.    Separate yourself from other people and animals in your home. If possible, stay in a separate room and use a separate bathroom from others in your house. Restrict contact with pets, as there is a possibility of transmission of viruses. Avoid intimate contact with others. This is included hand holding, kissing, hugging as well as any sexual contact. Wear a facemask when you are around other people. Cover your mouth when you cough or sneeze. Wash your hands often with warm soapy water for at least 20 seconds. If soap and water are not available, use an alcohol based hand . Clean all high touch surfaces everyday. For example: counters, tabletops, doorknobs, bathroom fixtures, toilets, phones, keyboards, tablets, and bedside tables. Monitor your symptoms at home. Seek prompt medical attention if you symptoms worsen. (i.e. difficulty breathing). Don't forget to change your toothbrush and change your bed linen frequently! Come back to the ER if you have worsening symptoms, difficulty breathing or speaking in full sentences, fevers over 100.9 that do not improved with tylenol or Ibuprofen, shaking chills, nausea or vomiting. Patient Education        Upper Respiratory Infection (Cold): Care Instructions  Your Care Instructions    An upper respiratory infection, or URI, is an infection of the nose, sinuses, or throat. URIs are spread by coughs, sneezes, and direct contact. The common cold is the most frequent kind of URI. The flu and sinus infections are other kinds of URIs. Almost all URIs are caused by viruses. Antibiotics won't cure them. But you can treat most infections with home care. This may include drinking lots of fluids and taking over-the-counter pain medicine. You will probably feel better in 4 to 10 days. The doctor has checked you carefully, but problems can develop later.  If you notice any problems or new symptoms, get medical treatment right away. Follow-up care is a key part of your treatment and safety. Be sure to make and go to all appointments, and call your doctor if you are having problems. It's also a good idea to know your test results and keep a list of the medicines you take. How can you care for yourself at home? · To prevent dehydration, drink plenty of fluids, enough so that your urine is light yellow or clear like water. Choose water and other caffeine-free clear liquids until you feel better. If you have kidney, heart, or liver disease and have to limit fluids, talk with your doctor before you increase the amount of fluids you drink. · Take an over-the-counter pain medicine, such as acetaminophen (Tylenol), ibuprofen (Advil, Motrin), or naproxen (Aleve). Read and follow all instructions on the label. · Before you use cough and cold medicines, check the label. These medicines may not be safe for young children or for people with certain health problems. · Be careful when taking over-the-counter cold or flu medicines and Tylenol at the same time. Many of these medicines have acetaminophen, which is Tylenol. Read the labels to make sure that you are not taking more than the recommended dose. Too much acetaminophen (Tylenol) can be harmful. · Get plenty of rest.  · Do not smoke or allow others to smoke around you. If you need help quitting, talk to your doctor about stop-smoking programs and medicines. These can increase your chances of quitting for good. When should you call for help? Call 911 anytime you think you may need emergency care.  For example, call if:    · You have severe trouble breathing.    Call your doctor now or seek immediate medical care if:    · You seem to be getting much sicker.     · You have new or worse trouble breathing.     · You have a new or higher fever.     · You have a new rash.    Watch closely for changes in your health, and be sure to contact your doctor if:    · You have a new symptom, such as a sore throat, an earache, or sinus pain.     · You cough more deeply or more often, especially if you notice more mucus or a change in the color of your mucus.     · You do not get better as expected. Where can you learn more? Go to http://jaja-ammon.info/  Enter K520 in the search box to learn more about \"Upper Respiratory Infection (Cold): Care Instructions. \"  Current as of: June 9, 2019Content Version: 12.4  © 7164-2215 [employer]. Care instructions adapted under license by [employer] (which disclaims liability or warranty for this information). If you have questions about a medical condition or this instruction, always ask your healthcare professional. [address] any warranty or liability for your use of this information.

## 2020-03-31 NOTE — TELEPHONE ENCOUNTER
Patient called in today she states she has been experiencing cough, shortness of breath, chest pain, temp of 101 for about 1 week and symptoms have worsened. She states her eyes have been  completely shut for 3-4 days in the mornings when she wakes up. She states she is not taking any medication right now. I advised patient to seek medical care at ED since she has been having trouble breathing and catching her breath.   She verbalized her understanding and will go to [facility].

## 2020-03-31 NOTE — ED PROVIDER NOTES
Initial Complaint: cough, fever, bilateral conjunctivitis    Started: Eyes 3 days ago, cough/fever 1 week ao    Endorses: Cough productive of brown sputum, fevers as high as 101. Both eyes crusted shut each morning. Occasional diarrhea. Throat is scratchy. Son at home has a cough. Not currently working but has been to the grocery store. Smoker < 1 PPD  Denies: chills, nausea, vomiting    Made better: hot tea  Made worse: laying down. No further complaints. Past Medical History:  No date: Acid indigestion  No date: Alopecia  No date: Chest pain  3/2/2011: Depression  No date: Diverticulosis of colon  No date: Dizziness  No date: Emphysema of lung (HCC)      Comment:  on CXR  No date: External hemorrhoid  No date: Family history of esophageal cancer  No date: H. pylori infection  No date: Hyperlipidemia  No date: Hyperlipidemia  No date: Inflammatory arthritis  No date: Joint pain  No date: Joint swelling  No date: Lupus ([address]  No date: Lupus ([address]  No date: Migraines  No date: Muscle aches  No date: Osteopenia      Comment:  DEXA 11  No date: PUD (peptic ulcer disease)  No date: Sicca syndrome ([address]  2012: Sicca syndrome ([address]  2011: Sjogren's disease ([address]  2011: Sjogren's disease ([address]  No date: Sjogrens syndrome ([address]  No date: Stiffness joints  No date: Trouble in sleeping  Past Surgical History:  : BIOPSY BREAST      Comment:  Benign  No date: HX  SECTION      Comment:  x 3  Reviewed      Primary care provider: Casi Booker MD      The history is provided by the patient. No  was used.       Past Medical History:   Diagnosis Date    Acid indigestion     Alopecia     Chest pain     Depression 3/2/2011    Diverticulosis of colon     Dizziness     Emphysema of lung (HCC)     on CXR    External hemorrhoid     Family history of esophageal cancer     H. pylori infection     Hyperlipidemia     Hyperlipidemia     Inflammatory arthritis     Joint pain     Joint swelling     [facility]     Lupus ([facility]     Migraines     Muscle aches     Osteopenia     DEXA 11    PUD (peptic ulcer disease)     Sicca syndrome (HCC)     Sicca syndrome ([facility] 2012    Sjogren's disease ([facility] 2011    Sjogren's disease ([facility] 2011    Sjogrens syndrome ([facility]     Stiffness joints     Trouble in sleeping      Past Surgical History:   Procedure Laterality Date    BIOPSY BREAST  2011    Benign    HX  SECTION      x 3       Family History:   Problem Relation Age of Onset    Cancer Mother 54        breast    Cancer Father         throat,stomach     Social History     Socioeconomic History    Marital status: LEGALLY      Spouse name: Hardeep Villafuerte Number of children: 3    Years of education: 8    Highest education level: Not on file   Occupational History    Occupation: unemployed   Social Needs    Financial resource strain: Not on file    Food insecurity     Worry: Not on file     Inability: Not on file   [employer] needs     Medical: Not on file     Non-medical: Not on file   Tobacco Use    Smoking status: Current Some Day Smoker     Packs/day: 0.80     Years: 35.00     Pack years: 28.00     Types: Cigarettes    Smokeless tobacco: Never Used    Tobacco comment: 15 cigs daily    Substance and Sexual Activity    Alcohol use: No     Alcohol/week: 0.0 standard drinks    Drug use: No    Sexual activity: Yes     Partners: Male     Comment:  occasional get together   Lifestyle    Physical activity     Days per week: Not on file     Minutes per session: Not on file    Stress: Not on file   Relationships    Social connections     Talks on phone: Not on file     Gets together: Not on file     Attends Spiritism service: Not on file     Active member of club or organization: Not on file     Attends meetings of clubs or organizations: Not on file     Relationship status: Not on file    Intimate partner violence Fear of current or ex partner: Not on file     Emotionally abused: Not on file     Physically abused: Not on file     Forced sexual activity: Not on file   Other Topics Concern     Service No    Blood Transfusions No    Caffeine Concern No    Occupational Exposure Not Asked    Hobby Hazards Not Asked    Sleep Concern Not Asked    Stress Concern Not Asked    Weight Concern Not Asked    Special Diet Not Asked    Back Care Not Asked    Exercise Not Asked    Bike Helmet Not Asked    Seat Belt Yes     Comment: sometimes    Self-Exams Yes   Social History Narrative    Not on file     ALLERGIES: Mobic [meloxicam]; Codeine [codeine]; and Voltaren [diclofenac sodium]    Review of Systems   Constitutional: Positive for activity change, fatigue and fever. Negative for chills. HENT: Positive for sore throat (scratchy). Eyes: Positive for discharge. Respiratory: Positive for cough and shortness of breath. Gastrointestinal: Positive for diarrhea. Negative for nausea and vomiting. Musculoskeletal: Negative. Psychiatric/Behavioral: Negative. All other systems reviewed and are negative. Vitals:    03/31/20 1419   BP: 133/83   Pulse: 89   Resp: 18   Temp: 98 °F (36.7 °C)   SpO2: 96%          Physical Exam  Vitals signs and nursing note reviewed. Exam conducted with a chaperone present. Constitutional:       General: She is not in acute distress. Appearance: Normal appearance. She is not ill-appearing, toxic-appearing or diaphoretic. HENT:      Head: Normocephalic and atraumatic. Nose: Nose normal.      Mouth/Throat:      Lips: Pink. Mouth: Mucous membranes are moist.      Pharynx: Oropharynx is clear. Uvula midline. No posterior oropharyngeal erythema or uvula swelling. Tonsils: No tonsillar exudate. Eyes:      General: Lids are normal.      Conjunctiva/sclera:      Right eye: Right conjunctiva is injected. Left eye: Left conjunctiva is injected. Comments: Mattering of eyelashes   Neck:      Musculoskeletal: Normal range of motion. Cardiovascular:      Rate and Rhythm: Normal rate. Pulses: Normal pulses. Pulmonary:      Effort: Pulmonary effort is normal. No respiratory distress. Breath sounds: No decreased air movement. Comments: Speaking in full sentences. Mild scattered crackles in all fields. Lymphadenopathy:      Head:      Right side of head: No submental, submandibular, tonsillar, preauricular or posterior auricular adenopathy. Left side of head: No submental, submandibular, tonsillar, preauricular or posterior auricular adenopathy. Cervical: No cervical adenopathy. Neurological:      Mental Status: She is alert. Psychiatric:         Attention and Perception: Attention and perception normal.         Mood and Affect: Mood and affect normal.         Speech: Speech normal.         Behavior: Behavior normal. Behavior is cooperative. Thought Content: Thought content normal.         Cognition and Memory: Cognition and memory normal.         Judgment: Judgment normal.        MDM       Procedures      Assessment & Plan:     Orders Placed This Encounter    XR CHEST PORT       Assigned attending physician:  Princess Adams MD,ED Provider    William Brennan NP  03/31/20  2:26 PM      No acute changes on chest x-ray. Findings are chronic. Supportive care. Refilled albuterol and duo nebs. Tessalon Perles for cough. Tylenol ibuprofen as needed. Primary care follow-up. Discussed return precautions. 3:04 PM  Patient re-evaluated. All questions answered. Patient appropriate for discharge. Given return precautions and follow up instructions. LABORATORY TESTS:  Labs Reviewed - No data to display    IMAGING RESULTS:  XR CHEST PORT   Final Result   IMPRESSION:   No acute process. MEDICATIONS GIVEN:  Medications - No data to display    IMPRESSION:  1. Acute upper respiratory infection    2. COPD with asthma ([facility]    3. Seasonal allergic rhinitis, unspecified trigger        PLAN:  1. Current Discharge Medication List      START taking these medications    Details   trimethoprim-polymyxin b (POLYTRIM) ophthalmic solution Administer 1 Drop to both eyes every four (4) hours. Indications: pink eye from bacterial infection  Qty: 10 mL, Refills: 0      benzonatate (Tessalon Perles) 100 mg capsule Take 1 Cap by mouth three (3) times daily as needed for Cough for up to 7 days. Qty: 21 Cap, Refills: 0         CONTINUE these medications which have CHANGED    Details   albuterol-ipratropium (DUO-NEB) 2.5 mg-0.5 mg/3 ml nebu 3 mL by Nebulization route every six (6) hours as needed (asthma, COPD). Qty: 60 Nebule, Refills: 0    Associated Diagnoses: COPD with asthma ([facility]      albuterol (ProAir HFA) 90 mcg/actuation inhaler Take 2 Puffs by inhalation every four (4) hours as needed for Wheezing or Shortness of Breath. Qty: 1 Inhaler, Refills: 0      loratadine (Claritin) 10 mg tablet Take 1 Tab by mouth daily. Qty: 30 Tab, Refills: 0    Associated Diagnoses: Seasonal allergic rhinitis, unspecified trigger           2. Follow-up Information     Follow up With Specialties Details Why Nelia Mcallister MD Pediatrics, Internal Medicine Schedule an appointment as soon as possible for a visit  [address] [facility] [place]  [phone]      [address] [facility]  As needed, If symptoms worsen [address] [facility]  [phone]        3. Return to ED for new or worsening symptoms       Tanya Hicks NP      Please note that this dictation was completed with [employer], the [employer] voice recognition software. Quite often unanticipated grammatical, syntax, homophones, and other interpretive errors are inadvertently transcribed by the computer software. Please disregard these errors.   Please excuse any errors that have escaped final proofreading. I was personally available for consultation in the emergency department. I have reviewed the chart and agree with the documentation recorded by the [facility] AND [facility], including the assessment, treatment plan, and disposition.   Fili Archer MD

## 2020-03-31 NOTE — ED TRIAGE NOTES
Pt comes in for COVID testing. Pt states she has pin eye in both eyes, SOB, cough, and fevers that have developed over the past week. Denies sick contacts or travel.

## 2020-04-01 ENCOUNTER — PATIENT OUTREACH (OUTPATIENT)
Dept: FAMILY MEDICINE CLINIC | Age: 60
End: 2020-04-01

## 2020-04-01 NOTE — PROGRESS NOTES
COVID-19 Screening Initial Follow-up Note    Patient contacted regarding COVID-19  risk. Care Transition Nurse/ Ambulatory Care Manager contacted the patient by telephone to perform post discharge assessment. Verified name and  with patient as identifiers. Provided introduction to self, and explanation of the CTN/ACM role, and reason for call due to risk factors for infection and/or exposure to COVID-19. Symptoms reviewed with patient who verbalized the following symptoms: no new or worsening symptoms, but not feeling any better yet. Had trouble sleeping d/t cough. Unsure if any fever this am, has not checked. Did get the new eye drop prescription and Tessalon Perles. Continuing to use Duoneb, Proair HFA and Claritin. Patient has following risk factors of: Raynauds, Migraines,long term use of steroids and immunosuppressed med,Sjogrens,Inflammatory arthritis. CTN/ACM reviewed discharge instructions, medical action plan and red flags such as increased shortness of breath, increasing fever and signs of decompensation with patient who verbalized understanding. Discussed exposure protocols and quarantine with CDC Guidelines What to do if you are sick with coronavirus disease 2019 Patient who was given an opportunity for questions and concerns. The patient agrees to contact the [facility] exposure line [phone], local [facility] department [facility]  [phone] and PCP office for questions related to their healthcare. CTN/ACM provided contact information for future reference.     Reviewed and educated patient on any new and changed medications related to discharge diagnosis     Plan for follow-up call in 14 days based on severity of symptoms and risk factors

## 2020-04-15 ENCOUNTER — PATIENT OUTREACH (OUTPATIENT)
Dept: FAMILY MEDICINE CLINIC | Age: 60
End: 2020-04-15

## 2020-04-15 NOTE — PROGRESS NOTES
Patient resolved from Transition of Care episode on 4/15/2020  Patient/family has been provided the following resources and education related to COVID-19:                         Signs, symptoms and red flags related to COVID-19            CDC exposure and quarantine guidelines            [facility] exposure contact - [phone]            Contact for their local Department of Health               Patient currently reports that the following symptoms have improved:  no new/worsening symptoms     No further outreach scheduled with this CTN/ACM. Episode of Care resolved. Patient has this CTN/ACM contact information if future needs arise.

## 2020-05-07 DIAGNOSIS — G43.909 MIGRAINE WITHOUT STATUS MIGRAINOSUS, NOT INTRACTABLE, UNSPECIFIED MIGRAINE TYPE: ICD-10-CM

## 2020-05-08 RX ORDER — BUTALBITAL, ACETAMINOPHEN AND CAFFEINE 50; 325; 40 MG/1; MG/1; MG/1
TABLET ORAL
Qty: 20 TAB | Refills: 0 | Status: SHIPPED | OUTPATIENT
Start: 2020-05-08 | End: 2020-07-14

## 2020-06-30 DIAGNOSIS — J30.2 SEASONAL ALLERGIC RHINITIS, UNSPECIFIED TRIGGER: ICD-10-CM

## 2020-06-30 DIAGNOSIS — J44.9 COPD WITH ASTHMA (HCC): ICD-10-CM

## 2020-06-30 RX ORDER — IPRATROPIUM BROMIDE AND ALBUTEROL SULFATE 2.5; .5 MG/3ML; MG/3ML
SOLUTION RESPIRATORY (INHALATION)
Qty: 30 NEBULE | Refills: 1 | Status: SHIPPED | OUTPATIENT
Start: 2020-06-30 | End: 2020-08-07 | Stop reason: SDUPTHER

## 2020-06-30 RX ORDER — LORATADINE 10 MG/1
TABLET ORAL
Qty: 30 TAB | Refills: 1 | Status: SHIPPED | OUTPATIENT
Start: 2020-06-30 | End: 2020-08-07 | Stop reason: SDUPTHER

## 2020-07-08 NOTE — TELEPHONE ENCOUNTER
Writer informed pt that her medication's were approved,pt's scheduled to see Madeline Cortes on 8/7/20.

## 2020-07-12 DIAGNOSIS — G43.909 MIGRAINE WITHOUT STATUS MIGRAINOSUS, NOT INTRACTABLE, UNSPECIFIED MIGRAINE TYPE: ICD-10-CM

## 2020-07-14 RX ORDER — BUTALBITAL, ACETAMINOPHEN AND CAFFEINE 50; 325; 40 MG/1; MG/1; MG/1
TABLET ORAL
Qty: 20 TAB | Refills: 0 | Status: SHIPPED | OUTPATIENT
Start: 2020-07-14 | End: 2020-08-07 | Stop reason: SDUPTHER

## 2020-08-07 ENCOUNTER — OFFICE VISIT (OUTPATIENT)
Dept: INTERNAL MEDICINE CLINIC | Age: 60
End: 2020-08-07
Payer: MEDICAID

## 2020-08-07 VITALS
WEIGHT: 112 LBS | TEMPERATURE: 97.9 F | BODY MASS INDEX: 18 KG/M2 | HEIGHT: 66 IN | RESPIRATION RATE: 12 BRPM | HEART RATE: 76 BPM | OXYGEN SATURATION: 97 % | DIASTOLIC BLOOD PRESSURE: 68 MMHG | SYSTOLIC BLOOD PRESSURE: 136 MMHG

## 2020-08-07 DIAGNOSIS — E53.8 VITAMIN B12 DEFICIENCY: ICD-10-CM

## 2020-08-07 DIAGNOSIS — E55.9 VITAMIN D DEFICIENCY: ICD-10-CM

## 2020-08-07 DIAGNOSIS — Z12.31 ENCOUNTER FOR SCREENING MAMMOGRAM FOR BREAST CANCER: ICD-10-CM

## 2020-08-07 DIAGNOSIS — M19.90 INFLAMMATORY ARTHRITIS: ICD-10-CM

## 2020-08-07 DIAGNOSIS — Z11.59 NEED FOR HEPATITIS C SCREENING TEST: ICD-10-CM

## 2020-08-07 DIAGNOSIS — M35.00 SJOGREN'S SYNDROME, WITH UNSPECIFIED ORGAN INVOLVEMENT (HCC): ICD-10-CM

## 2020-08-07 DIAGNOSIS — Z01.419 ENCOUNTER FOR GYNECOLOGICAL EXAMINATION WITHOUT ABNORMAL FINDING: ICD-10-CM

## 2020-08-07 DIAGNOSIS — J30.2 SEASONAL ALLERGIC RHINITIS, UNSPECIFIED TRIGGER: ICD-10-CM

## 2020-08-07 DIAGNOSIS — Z23 ENCOUNTER FOR IMMUNIZATION: ICD-10-CM

## 2020-08-07 DIAGNOSIS — Z13.220 LIPID SCREENING: ICD-10-CM

## 2020-08-07 DIAGNOSIS — L65.9 ALOPECIA: ICD-10-CM

## 2020-08-07 DIAGNOSIS — E61.1 IRON DEFICIENCY: ICD-10-CM

## 2020-08-07 DIAGNOSIS — F17.200 SMOKING: ICD-10-CM

## 2020-08-07 DIAGNOSIS — G43.909 MIGRAINE WITHOUT STATUS MIGRAINOSUS, NOT INTRACTABLE, UNSPECIFIED MIGRAINE TYPE: ICD-10-CM

## 2020-08-07 DIAGNOSIS — J44.9 COPD WITH ASTHMA (HCC): ICD-10-CM

## 2020-08-07 DIAGNOSIS — Z00.00 WELL WOMAN EXAM (NO GYNECOLOGICAL EXAM): Primary | ICD-10-CM

## 2020-08-07 PROCEDURE — 90750 HZV VACC RECOMBINANT IM: CPT

## 2020-08-07 PROCEDURE — 99214 OFFICE O/P EST MOD 30 MIN: CPT | Performed by: INTERNAL MEDICINE

## 2020-08-07 PROCEDURE — 90715 TDAP VACCINE 7 YRS/> IM: CPT

## 2020-08-07 PROCEDURE — 99396 PREV VISIT EST AGE 40-64: CPT | Performed by: INTERNAL MEDICINE

## 2020-08-07 RX ORDER — BUTALBITAL, ACETAMINOPHEN AND CAFFEINE 50; 325; 40 MG/1; MG/1; MG/1
TABLET ORAL
Qty: 20 TAB | Refills: 2 | Status: SHIPPED | OUTPATIENT
Start: 2020-08-07 | End: 2020-09-24 | Stop reason: SDUPTHER

## 2020-08-07 RX ORDER — IPRATROPIUM BROMIDE AND ALBUTEROL SULFATE 2.5; .5 MG/3ML; MG/3ML
SOLUTION RESPIRATORY (INHALATION)
Qty: 30 NEBULE | Refills: 5 | Status: SHIPPED | OUTPATIENT
Start: 2020-08-07 | End: 2022-09-06 | Stop reason: SDUPTHER

## 2020-08-07 RX ORDER — FLUTICASONE PROPIONATE 50 MCG
2 SPRAY, SUSPENSION (ML) NASAL DAILY
Qty: 3 BOTTLE | Refills: 3 | Status: SHIPPED | OUTPATIENT
Start: 2020-08-07 | End: 2021-02-09 | Stop reason: SDUPTHER

## 2020-08-07 RX ORDER — ALBUTEROL SULFATE 90 UG/1
2 AEROSOL, METERED RESPIRATORY (INHALATION)
Qty: 1 INHALER | Refills: 2 | Status: SHIPPED | OUTPATIENT
Start: 2020-08-07 | End: 2022-09-06 | Stop reason: SDUPTHER

## 2020-08-07 RX ORDER — FLUTICASONE PROPIONATE AND SALMETEROL 250; 50 UG/1; UG/1
1 POWDER RESPIRATORY (INHALATION) EVERY 12 HOURS
Qty: 3 INHALER | Refills: 1 | Status: SHIPPED | OUTPATIENT
Start: 2020-08-07 | End: 2021-02-09 | Stop reason: SDUPTHER

## 2020-08-07 RX ORDER — BUDESONIDE 0.5 MG/2ML
500 INHALANT ORAL 2 TIMES DAILY
Qty: 60 EACH | Refills: 5 | Status: CANCELLED | OUTPATIENT
Start: 2020-08-07

## 2020-08-07 RX ORDER — LORATADINE 10 MG/1
TABLET ORAL
Qty: 90 TAB | Refills: 1 | Status: SHIPPED | OUTPATIENT
Start: 2020-08-07 | End: 2021-02-09 | Stop reason: SDUPTHER

## 2020-08-07 RX ORDER — AMLODIPINE BESYLATE 2.5 MG/1
2.5 TABLET ORAL DAILY
Qty: 90 TAB | Refills: 1 | Status: SHIPPED | OUTPATIENT
Start: 2020-08-07 | End: 2021-02-02

## 2020-08-07 RX ORDER — VARENICLINE TARTRATE 1 MG/1
1 TABLET, FILM COATED ORAL 2 TIMES DAILY
Qty: 60 TAB | Refills: 4 | Status: SHIPPED | OUTPATIENT
Start: 2020-08-07 | End: 2022-03-08 | Stop reason: ALTCHOICE

## 2020-08-07 RX ORDER — VARENICLINE TARTRATE 25 MG
KIT ORAL
Qty: 1 DOSE PACK | Refills: 0 | Status: SHIPPED | OUTPATIENT
Start: 2020-08-07 | End: 2022-03-08 | Stop reason: ALTCHOICE

## 2020-08-07 NOTE — PROGRESS NOTES
Rm#[id number]    No chief complaint on file. 1. Have you been to the ER, urgent care clinic since your last visit? Hospitalized since your last visit? Yes [facility], 3-31-20, URI     2. Have you seen or consulted any other health care providers outside of the [address] [place] [address] since your last visit? Include any pap smears or colon screening.  No     Health Maintenance Due   Topic Date Due    Hepatitis C Screening  1960    DTaP/Tdap/Td series (1 - Tdap) 10/11/1981    Shingrix Vaccine Age 50> (1 of 2) 10/11/2010    PAP AKA CERVICAL CYTOLOGY  08/11/2014    Breast Cancer Screen Mammogram  01/22/2017    Lipid Screen  03/04/2020    Influenza Age 5 to Adult  08/01/2020     3 most recent PHQ Screens 10/27/2015   Little interest or pleasure in doing things Not at all   Feeling down, depressed, irritable, or hopeless Not at all   Total Score PHQ 2 0     Recent Travel Screening and Travel History documentation     Travel Screening      No screening recorded since 08/06/20 0000      Travel History   Travel since 07/07/20     No documented travel since 07/07/20

## 2020-08-07 NOTE — PROGRESS NOTES
HPI:  Presents for f/u asthma/copd, ha    Pt inquires re: renewing med for Raynaud's    No longer f/u with rheum since Dr. Leroy Cisse left the practice    Using duoneb daily    Smoking reduced, but has not quit      Past medical, Social, and Family history reviewed    Prior to Admission medications    Medication Sig Start Date End Date Taking? Authorizing Provider   butalbital-acetaminophen-caffeine (FIORICET, ESGIC) -40 mg per tablet TAKE ONE TABLET BY MOUTH EVERY 6 HOURS AS NEEDED FOR HEADACHE 7/14/20  Yes [place] NP   loratadine (CLARITIN) 10 mg tablet TAKE ONE TABLET BY MOUTH DAILY 6/30/20  Yes Traci Napoles MD   albuterol-ipratropium (DUO-NEB) 2.5 mg-0.5 mg/3 ml nebu INHALE THREE MILLILITERS VIA NEBULIZATION BY MOUTH EVERY 6 HOURS AS NEEDED FOR ASTHMA COPD 6/30/20  Yes Traci Napoles MD   albuterol (ProAir HFA) 90 mcg/actuation inhaler Take 2 Puffs by inhalation every four (4) hours as needed for Wheezing or Shortness of Breath. 3/31/20  Yes Arianne Mckeon NP   budesonide (PULMICORT) 0.5 mg/2 mL nbsp 2 mL by Nebulization route two (2) times a day. 6/11/19  Yes Traci Napoles MD   inhalational spacing device (MICROCHAMBER) 1 Each by Does Not Apply route as needed for Cough. 6/11/19  Yes Traci Napoles MD   fluticasone propionate (FLONASE) 50 mcg/actuation nasal spray 2 Sprays by Both Nostrils route daily. Patient taking differently: 2 Sprays by Both Nostrils route daily as needed. 6/11/19  Yes Traci Napoles MD   trimethoprim-polymyxin b (POLYTRIM) ophthalmic solution Administer 1 Drop to both eyes every four (4) hours. Indications: pink eye from bacterial infection 3/31/20   Cathie ONEIL NP          ROS  Complete ROS reviewed and negative or stable except as noted in HPI. Physical Exam  Vitals signs and nursing note reviewed. Constitutional:       General: She is not in acute distress. HENT:      Head: Normocephalic and atraumatic.       Mouth/Throat:      Pharynx: No oropharyngeal exudate. Eyes:      General: No scleral icterus. Pupils: Pupils are equal, round, and reactive to light. Neck:      Musculoskeletal: Normal range of motion and neck supple. Thyroid: No thyromegaly. Vascular: No JVD. Cardiovascular:      Rate and Rhythm: Normal rate and regular rhythm. Heart sounds: Normal heart sounds. No murmur. No friction rub. No gallop. Pulmonary:      Effort: Pulmonary effort is normal. No respiratory distress. Breath sounds: Normal breath sounds. No wheezing or rales. Abdominal:      General: Bowel sounds are normal. There is no distension. Palpations: Abdomen is soft. Tenderness: There is no abdominal tenderness. Musculoskeletal: Normal range of motion. Lymphadenopathy:      Cervical: No cervical adenopathy. Skin:     General: Skin is warm. Findings: No rash. Neurological:      Mental Status: She is alert and oriented to person, place, and time. Motor: No abnormal muscle tone. Coordination: Coordination normal.           Prior labs reviewed. Assessment/Plan:    ICD-10-CM ICD-9-CM    1. COPD with asthma ([facility]  J44.9 493.20 albuterol-ipratropium (DUO-NEB) 2.5 mg-0.5 mg/3 ml nebu   2. Migraine without status migrainosus, not intractable, unspecified migraine type  G43.909 346.90 butalbital-acetaminophen-caffeine (FIORICET, ESGIC) -40 mg per tablet   3. Seasonal allergic rhinitis, unspecified trigger  J30.2 477.9 fluticasone propionate (FLONASE) 50 mcg/actuation nasal spray      loratadine (CLARITIN) 10 mg tablet   4. Sjogren's syndrome, with unspecified organ involvement ([facility]  M35.00 710.2 REFERRAL TO RHEUMATOLOGY      METABOLIC PANEL, COMPREHENSIVE   5. Inflammatory arthritis  M19.90 714.9 REFERRAL TO RHEUMATOLOGY      METABOLIC PANEL, COMPREHENSIVE      SED RATE (ESR)      C REACTIVE PROTEIN, QT   6.  Smoking  F17.200 305.1 varenicline (CHANTIX STARTER KIMBERLY) 0.5 mg (11)- 1 mg (42) DsPk      varenicline (CHANTIX) 1 mg tablet      METABOLIC PANEL, COMPREHENSIVE   7. Alopecia  L65.9 704.00 T4, FREE      TSH 3RD GENERATION   8. Vitamin D deficiency  E55.9 268.9 VITAMIN D, 25 HYDROXY   9. Vitamin B12 deficiency  E53.8 266.2 VITAMIN B12   10. Lipid screening  Z13.220 V77.91 LIPID PANEL   11. Iron deficiency  E61.1 280.9 CBC WITH AUTOMATED DIFF      FERRITIN      IRON PROFILE   12. Encounter for immunization  Z23 V03.89 TETANUS, DIPHTHERIA TOXOIDS AND ACELLULAR PERTUSSIS VACCINE (TDAP), IN INDIVIDS. >=7, IM      ZOSTER VACC RECOMBINANT ADJUVANTED   13. Need for hepatitis C screening test  Z11.59 V73.89 HCV AB W/RFLX TO JORGE   14. Encounter for screening mammogram for breast cancer  Z12.31 V76.12 DESI MAMMO BI SCREENING INCL CAD   13.  Encounter for gynecological examination without abnormal finding  Z01.419 V72.31 REFERRAL TO OBSTETRICS AND GYNECOLOGY   12. Well woman exam (no gynecological exam)  Z00.00 V70.0     [V70.0]     Follow-up and Dispositions    · Return in about 6 months (around 2/7/2021), or if symptoms worsen or fail to improve, for asthma, etc.        results and schedule of future studies reviewed with patient  reviewed diet, exercise and weight  very strongly urged to quit smoking to reduce cardiovascular risk  cardiovascular risk and specific lipid/LDL goals reviewed  reviewed medications and side effects in detail     Resume norvasc  Ref back to Rheum  Refill meds  Dc pulmicort   Change to advair  Consider LAMA in the future if needed  Return for labs  Tdap   Shingrix #1  chantix

## 2020-08-07 NOTE — PROGRESS NOTES
Subjective:   61 y.o. female for Well Woman Check. Her gyne and breast care is done elsewhere by her Ob-Gyne physician. Past medical, Social, and Family history reviewed  Medications reviewed and updated. ROS: Feeling generally well. No TIA's or unusual headaches, no dysphagia. No prolonged cough. No dyspnea or chest pain on exertion. No abdominal pain, change in bowel habits, black or bloody stools. No urinary tract symptoms. No new or unusual musculoskeletal symptoms. Specific concerns today:     See other. Objective: The patient appears well, alert, oriented x 3, in no distress. Visit Vitals  /68 (BP 1 Location: Right arm, BP Patient Position: Sitting)   Pulse 76   Temp 97.9 °F (36.6 °C) (Oral)   Resp 12   Ht 5' 6\" (1.676 m)   Wt 112 lb (50.8 kg)   LMP 11/16/2012   SpO2 97%   BMI 18.08 kg/m²     ENT normal.  Neck supple. No adenopathy or thyromegaly. YAZAN. Lungs are clear, good air entry, no wheezes, rhonchi or rales. S1 and S2 normal, no murmurs, regular rate and rhythm. Abdomen soft without tenderness, guarding, mass or organomegaly. Extremities show no edema, normal peripheral pulses. Neurological is normal, no focal findings. Breast and Pelvic exams are deferred. Prior labs reviewed. Assessment/Plan:   Well Woman  follow low fat diet, routine labs ordered, call if any problems    ICD-10-CM ICD-9-CM    1. Well woman exam (no gynecological exam)  Z00.00 V70.0     [V70.0]   2. COPD with asthma ([facility]  J44.9 493.20 albuterol-ipratropium (DUO-NEB) 2.5 mg-0.5 mg/3 ml nebu   3. Migraine without status migrainosus, not intractable, unspecified migraine type  G43.909 346.90 butalbital-acetaminophen-caffeine (FIORICET, ESGIC) -40 mg per tablet   4. Seasonal allergic rhinitis, unspecified trigger  J30.2 477.9 fluticasone propionate (FLONASE) 50 mcg/actuation nasal spray      loratadine (CLARITIN) 10 mg tablet   5.  Sjogren's syndrome, with unspecified organ involvement ([facility]  M35.00 710.2 REFERRAL TO RHEUMATOLOGY      METABOLIC PANEL, COMPREHENSIVE   6. Inflammatory arthritis  M19.90 714.9 REFERRAL TO RHEUMATOLOGY      METABOLIC PANEL, COMPREHENSIVE      SED RATE (ESR)      C REACTIVE PROTEIN, QT   7. Smoking  F17.200 305.1 varenicline (CHANTIX STARTER KIMBERLY) 0.5 mg (11)- 1 mg (42) DsPk      varenicline (CHANTIX) 1 mg tablet      METABOLIC PANEL, COMPREHENSIVE   8. Alopecia  L65.9 704.00 T4, FREE      TSH 3RD GENERATION   9. Vitamin D deficiency  E55.9 268.9 VITAMIN D, 25 HYDROXY   10. Vitamin B12 deficiency  E53.8 266.2 VITAMIN B12   11. Lipid screening  Z13.220 V77.91 LIPID PANEL   12. Iron deficiency  E61.1 280.9 CBC WITH AUTOMATED DIFF      FERRITIN      IRON PROFILE   13. Encounter for immunization  Z23 V03.89 TETANUS, DIPHTHERIA TOXOIDS AND ACELLULAR PERTUSSIS VACCINE (TDAP), IN INDIVIDS. >=7, IM      ZOSTER VACC RECOMBINANT ADJUVANTED   14. Need for hepatitis C screening test  Z11.59 V73.89 HCV AB W/RFLX TO JORGE   15.  Encounter for screening mammogram for breast cancer  Z12.31 V76.12 DESI MAMMO BI SCREENING INCL CAD   12. Encounter for gynecological examination without abnormal finding  Z01.419 V72.31 REFERRAL TO OBSTETRICS AND GYNECOLOGY     Follow-up and Dispositions    · Return in about 6 months (around 2/7/2021), or if symptoms worsen or fail to improve, for asthma, etc.       results and schedule of future studies reviewed with patient  reviewed diet, exercise and weight   very strongly urged to quit smoking to reduce cardiovascular risk  cardiovascular risk and specific lipid/LDL goals reviewed  reviewed medications and side effects in detail    Ref GYN  shingrix  Tdap

## 2020-09-04 ENCOUNTER — HOSPITAL ENCOUNTER (OUTPATIENT)
Dept: MAMMOGRAPHY | Age: 60
Discharge: HOME | End: 2020-09-04
Attending: INTERNAL MEDICINE
Payer: MEDICAID

## 2020-09-04 DIAGNOSIS — F17.200 SMOKING: ICD-10-CM

## 2020-09-04 DIAGNOSIS — M35.00 SJOGREN'S SYNDROME, WITH UNSPECIFIED ORGAN INVOLVEMENT (HCC): ICD-10-CM

## 2020-09-04 DIAGNOSIS — M19.90 INFLAMMATORY ARTHRITIS: ICD-10-CM

## 2020-09-04 DIAGNOSIS — E55.9 VITAMIN D DEFICIENCY: ICD-10-CM

## 2020-09-04 DIAGNOSIS — E61.1 IRON DEFICIENCY: ICD-10-CM

## 2020-09-04 DIAGNOSIS — L65.9 ALOPECIA: ICD-10-CM

## 2020-09-04 DIAGNOSIS — Z13.220 LIPID SCREENING: ICD-10-CM

## 2020-09-04 DIAGNOSIS — E53.8 VITAMIN B12 DEFICIENCY: ICD-10-CM

## 2020-09-04 DIAGNOSIS — Z12.31 ENCOUNTER FOR SCREENING MAMMOGRAM FOR BREAST CANCER: ICD-10-CM

## 2020-09-04 PROCEDURE — 77067 SCR MAMMO BI INCL CAD: CPT

## 2020-09-07 DIAGNOSIS — Z11.59 NEED FOR HEPATITIS C SCREENING TEST: ICD-10-CM

## 2020-09-09 ENCOUNTER — LAB ONLY (OUTPATIENT)
Dept: INTERNAL MEDICINE CLINIC | Age: 60
End: 2020-09-09

## 2020-09-09 DIAGNOSIS — M19.90 INFLAMMATORY ARTHRITIS: ICD-10-CM

## 2020-09-09 DIAGNOSIS — Z00.00 WELL ADULT EXAM: ICD-10-CM

## 2020-09-09 DIAGNOSIS — E55.9 VITAMIN D DEFICIENCY: ICD-10-CM

## 2020-09-09 DIAGNOSIS — E53.8 VITAMIN B12 DEFICIENCY: ICD-10-CM

## 2020-09-09 DIAGNOSIS — E61.1 IRON DEFICIENCY: ICD-10-CM

## 2020-09-09 DIAGNOSIS — M35.00 SJOGREN'S SYNDROME, WITH UNSPECIFIED ORGAN INVOLVEMENT (HCC): Primary | ICD-10-CM

## 2020-09-09 DIAGNOSIS — Z11.59 NEED FOR HEPATITIS C SCREENING TEST: ICD-10-CM

## 2020-09-09 LAB
25(OH)D3 SERPL-MCNC: 11.2 NG/ML (ref 30–100)
ALBUMIN SERPL-MCNC: 3.9 G/DL (ref 3.5–5)
ALBUMIN/GLOB SERPL: 1.3 {RATIO} (ref 1.1–2.2)
ALP SERPL-CCNC: 73 U/L (ref 45–117)
ALT SERPL-CCNC: 19 U/L (ref 12–78)
ANION GAP SERPL CALC-SCNC: 2 MMOL/L (ref 5–15)
AST SERPL-CCNC: 9 U/L (ref 15–37)
BASOPHILS # BLD: 0 K/UL (ref 0–0.1)
BASOPHILS NFR BLD: 1 % (ref 0–1)
BILIRUB SERPL-MCNC: 0.3 MG/DL (ref 0.2–1)
BUN SERPL-MCNC: 14 MG/DL (ref 6–20)
BUN/CREAT SERPL: 28 (ref 12–20)
CALCIUM SERPL-MCNC: 9.4 MG/DL (ref 8.5–10.1)
CHLORIDE SERPL-SCNC: 103 MMOL/L (ref 97–108)
CHOLEST SERPL-MCNC: 223 MG/DL
CO2 SERPL-SCNC: 33 MMOL/L (ref 21–32)
COMMENT, HOLDF: NORMAL
CREAT SERPL-MCNC: 0.5 MG/DL (ref 0.55–1.02)
CRP SERPL-MCNC: <0.29 MG/DL (ref 0–0.6)
DIFFERENTIAL METHOD BLD: NORMAL
EOSINOPHIL # BLD: 0.2 K/UL (ref 0–0.4)
EOSINOPHIL NFR BLD: 3 % (ref 0–7)
ERYTHROCYTE [DISTWIDTH] IN BLOOD BY AUTOMATED COUNT: 13.4 % (ref 11.5–14.5)
ERYTHROCYTE [SEDIMENTATION RATE] IN BLOOD: 5 MM/HR (ref 0–30)
FERRITIN SERPL-MCNC: 16 NG/ML (ref 8–252)
GLOBULIN SER CALC-MCNC: 2.9 G/DL (ref 2–4)
GLUCOSE SERPL-MCNC: 90 MG/DL (ref 65–100)
HCT VFR BLD AUTO: 45.3 % (ref 35–47)
HDLC SERPL-MCNC: 87 MG/DL
HDLC SERPL: 2.6 {RATIO} (ref 0–5)
HGB BLD-MCNC: 14 G/DL (ref 11.5–16)
IMM GRANULOCYTES # BLD AUTO: 0 K/UL (ref 0–0.04)
IMM GRANULOCYTES NFR BLD AUTO: 0 % (ref 0–0.5)
IRON SATN MFR SERPL: 23 % (ref 20–50)
IRON SERPL-MCNC: 73 UG/DL (ref 35–150)
LDLC SERPL CALC-MCNC: 118.8 MG/DL (ref 0–100)
LIPID PROFILE,FLP: ABNORMAL
LYMPHOCYTES # BLD: 1.2 K/UL (ref 0.8–3.5)
LYMPHOCYTES NFR BLD: 25 % (ref 12–49)
MCH RBC QN AUTO: 28.7 PG (ref 26–34)
MCHC RBC AUTO-ENTMCNC: 30.9 G/DL (ref 30–36.5)
MCV RBC AUTO: 93 FL (ref 80–99)
MONOCYTES # BLD: 0.4 K/UL (ref 0–1)
MONOCYTES NFR BLD: 8 % (ref 5–13)
NEUTS SEG # BLD: 3.1 K/UL (ref 1.8–8)
NEUTS SEG NFR BLD: 63 % (ref 32–75)
NRBC # BLD: 0 K/UL (ref 0–0.01)
NRBC BLD-RTO: 0 PER 100 WBC
PLATELET # BLD AUTO: 229 K/UL (ref 150–400)
PMV BLD AUTO: 10.4 FL (ref 8.9–12.9)
POTASSIUM SERPL-SCNC: 5 MMOL/L (ref 3.5–5.1)
PROT SERPL-MCNC: 6.8 G/DL (ref 6.4–8.2)
RBC # BLD AUTO: 4.87 M/UL (ref 3.8–5.2)
SAMPLES BEING HELD,HOLD: NORMAL
SODIUM SERPL-SCNC: 138 MMOL/L (ref 136–145)
T4 FREE SERPL-MCNC: 1.1 NG/DL (ref 0.8–1.5)
TIBC SERPL-MCNC: 324 UG/DL (ref 250–450)
TRIGL SERPL-MCNC: 86 MG/DL (ref ?–150)
TSH SERPL DL<=0.05 MIU/L-ACNC: 0.68 UIU/ML (ref 0.36–3.74)
VIT B12 SERPL-MCNC: 275 PG/ML (ref 193–986)
VLDLC SERPL CALC-MCNC: 17.2 MG/DL
WBC # BLD AUTO: 4.9 K/UL (ref 3.6–11)

## 2020-09-10 DIAGNOSIS — E53.8 VITAMIN B12 DEFICIENCY: Primary | ICD-10-CM

## 2020-09-10 DIAGNOSIS — E55.9 VITAMIN D DEFICIENCY: ICD-10-CM

## 2020-09-10 LAB
HCV AB S/CO SERPL IA: <0.1 S/CO RATIO (ref 0–0.9)
HCV AB SERPL QL IA: NORMAL

## 2020-09-10 RX ORDER — MELATONIN
1000 DAILY
Qty: 30 TAB | Refills: 11 | Status: SHIPPED | OUTPATIENT
Start: 2020-09-10 | End: 2022-09-06 | Stop reason: SDUPTHER

## 2020-09-10 RX ORDER — LANOLIN ALCOHOL/MO/W.PET/CERES
500 CREAM (GRAM) TOPICAL DAILY
Qty: 30 TAB | Refills: 5 | Status: SHIPPED | OUTPATIENT
Start: 2020-09-10 | End: 2022-09-06 | Stop reason: SDUPTHER

## 2020-09-10 RX ORDER — ERGOCALCIFEROL 1.25 MG/1
50000 CAPSULE ORAL
Qty: 10 CAP | Refills: 0 | Status: SHIPPED | OUTPATIENT
Start: 2020-09-10 | End: 2020-11-13

## 2020-09-10 NOTE — PROGRESS NOTES
Vitamin D is low - take a high dose vitamin D once a week for 10 weeks to boost levels and take an additional 1000 units per day vitamin D to maintain levels long term. Vitamin B12 is in the low normal range - take a 500 mcg per day B12 supplement. LDL cholesterol is a little high, btu the good cholesterol (HDL) looks great. Limit saturated fat in the diet to improve LDL cholesterol.   Other labs are either normal or stable and at goal.  Continue other current medications

## 2020-09-24 DIAGNOSIS — G43.909 MIGRAINE WITHOUT STATUS MIGRAINOSUS, NOT INTRACTABLE, UNSPECIFIED MIGRAINE TYPE: ICD-10-CM

## 2020-09-29 RX ORDER — BUTALBITAL, ACETAMINOPHEN AND CAFFEINE 50; 325; 40 MG/1; MG/1; MG/1
1 TABLET ORAL
Qty: 20 TAB | Refills: 2 | Status: SHIPPED | OUTPATIENT
Start: 2020-09-29 | End: 2021-03-09 | Stop reason: SDUPTHER

## 2021-02-02 DIAGNOSIS — E55.9 VITAMIN D DEFICIENCY: ICD-10-CM

## 2021-02-02 RX ORDER — AMLODIPINE BESYLATE 2.5 MG/1
TABLET ORAL
Qty: 90 TAB | Refills: 1 | Status: SHIPPED | OUTPATIENT
Start: 2021-02-02 | End: 2021-08-11

## 2021-02-02 RX ORDER — ERGOCALCIFEROL 1.25 MG/1
CAPSULE ORAL
Qty: 10 CAP | Refills: 0 | OUTPATIENT
Start: 2021-02-02

## 2021-02-09 ENCOUNTER — OFFICE VISIT (OUTPATIENT)
Dept: INTERNAL MEDICINE CLINIC | Age: 61
End: 2021-02-09
Payer: MEDICAID

## 2021-02-09 VITALS
TEMPERATURE: 98.1 F | HEART RATE: 83 BPM | BODY MASS INDEX: 20.25 KG/M2 | SYSTOLIC BLOOD PRESSURE: 120 MMHG | DIASTOLIC BLOOD PRESSURE: 74 MMHG | OXYGEN SATURATION: 95 % | WEIGHT: 126 LBS | RESPIRATION RATE: 16 BRPM | HEIGHT: 66 IN

## 2021-02-09 DIAGNOSIS — M19.90 INFLAMMATORY ARTHRITIS: ICD-10-CM

## 2021-02-09 DIAGNOSIS — M71.331 SYNOVIAL CYST OF RIGHT WRIST: ICD-10-CM

## 2021-02-09 DIAGNOSIS — J30.2 SEASONAL ALLERGIC RHINITIS, UNSPECIFIED TRIGGER: ICD-10-CM

## 2021-02-09 DIAGNOSIS — J44.9 COPD WITH ASTHMA (HCC): Primary | ICD-10-CM

## 2021-02-09 DIAGNOSIS — G43.909 MIGRAINE WITHOUT STATUS MIGRAINOSUS, NOT INTRACTABLE, UNSPECIFIED MIGRAINE TYPE: ICD-10-CM

## 2021-02-09 DIAGNOSIS — Z23 ENCOUNTER FOR IMMUNIZATION: ICD-10-CM

## 2021-02-09 DIAGNOSIS — M35.00 SJOGREN'S SYNDROME, WITH UNSPECIFIED ORGAN INVOLVEMENT (HCC): ICD-10-CM

## 2021-02-09 PROCEDURE — 99214 OFFICE O/P EST MOD 30 MIN: CPT | Performed by: INTERNAL MEDICINE

## 2021-02-09 PROCEDURE — 90750 HZV VACC RECOMBINANT IM: CPT | Performed by: INTERNAL MEDICINE

## 2021-02-09 RX ORDER — SUMATRIPTAN 50 MG/1
TABLET, FILM COATED ORAL
Qty: 20 TAB | Refills: 3 | Status: SHIPPED | OUTPATIENT
Start: 2021-02-09 | End: 2021-11-11

## 2021-02-09 RX ORDER — LORATADINE 10 MG/1
TABLET ORAL
Qty: 90 TAB | Refills: 1 | Status: SHIPPED | OUTPATIENT
Start: 2021-02-09 | End: 2022-09-06

## 2021-02-09 RX ORDER — FLUTICASONE PROPIONATE AND SALMETEROL 250; 50 UG/1; UG/1
1 POWDER RESPIRATORY (INHALATION) EVERY 12 HOURS
Qty: 3 INHALER | Refills: 1 | Status: SHIPPED | OUTPATIENT
Start: 2021-02-09 | End: 2021-03-09 | Stop reason: CLARIF

## 2021-02-09 RX ORDER — FLUTICASONE PROPIONATE 50 MCG
2 SPRAY, SUSPENSION (ML) NASAL DAILY
Qty: 3 BOTTLE | Refills: 3 | Status: SHIPPED | OUTPATIENT
Start: 2021-02-09

## 2021-02-09 RX ORDER — AMITRIPTYLINE HYDROCHLORIDE 25 MG/1
TABLET, FILM COATED ORAL
Qty: 30 TAB | Refills: 5 | Status: SHIPPED | OUTPATIENT
Start: 2021-02-09 | End: 2022-02-25

## 2021-02-09 NOTE — PROGRESS NOTES
RM: 15    Chief Complaint   Patient presents with    Asthma     f/u      Visit Vitals  LMP 11/16/2012     Recent Travel Screening and Travel History documentation     Travel Screening      No screening recorded since 02/08/21 0000      Travel History   Travel since 01/09/21     No documented travel since 01/09/21               1. Have you been to the ER, urgent care clinic since your last visit? Hospitalized since your last visit? No    2. Have you seen or consulted any other health care providers outside of the [employer] [place] [address] since your last visit? Include any pap smears or colon screening.  No     Health Maintenance Due   Topic Date Due    COVID-19 Vaccine (1 of 2) 10/11/1976    PAP AKA CERVICAL CYTOLOGY  08/11/2014    Flu Vaccine (1) 09/01/2020    Shingrix Vaccine Age 50> (2 of 2) 10/02/2020        Learning Assessment 6/23/2016   PRIMARY LEARNER Patient   HIGHEST LEVEL OF EDUCATION - PRIMARY LEARNER  -   BARRIERS PRIMARY LEARNER -   CO-LEARNER CAREGIVER -   PRIMARY LANGUAGE ENGLISH   LEARNER PREFERENCE PRIMARY DEMONSTRATION   ANSWERED BY PATIENT   RELATIONSHIP SELF

## 2021-02-09 NOTE — PROGRESS NOTES
HPI:  established patient  Presents for f/u COPD/asthma    Pt vaping and feels better  No longer smoking. Using advair daily    Pt reports increased migraine related to stress of family members at home. 3 times per week HAs    Some allergy sx    Right wrist bulge and pain  Worse with activity    Past medical, Social, and Family history reviewed    Prior to Admission medications    Medication Sig Start Date End Date Taking? Authorizing Provider   amLODIPine (NORVASC) 2.5 mg tablet TAKE ONE TABLET BY MOUTH DAILY 2/2/21  Yes Victor Manuel Garcia MD   butalbital-acetaminophen-caffeine (FIORICET, [facility]) -40 mg per tablet Take 1 Tab by mouth every six (6) hours as needed for Headache. 9/29/20  Yes Victor Manuel Garcia MD   loratadine (CLARITIN) 10 mg tablet TAKE ONE TABLET BY MOUTH DAILY 8/7/20  Yes Victor Manuel Garcia MD   fluticasone propion-salmeteroL (ADVAIR/WIXELA) 250-50 mcg/dose diskus inhaler Take 1 Puff by inhalation every twelve (12) hours. 8/7/20  Yes Victor Manuel Gracia MD   cholecalciferol (Vitamin D3) (1000 Units /25 mcg) tablet Take 1 Tab by mouth daily. 9/10/20   Victor Manuel Garcia MD   cyanocobalamin (VITAMIN B12) 500 mcg tablet Take 1 Tab by mouth daily. 9/10/20   Victor Manuel Garcia MD   albuterol (ProAir HFA) 90 mcg/actuation inhaler Take 2 Puffs by inhalation every four (4) hours as needed for Wheezing or Shortness of Breath. 8/7/20   Victor Manuel Garcia MD   albuterol-ipratropium (DUO-NEB) 2.5 mg-0.5 mg/3 ml nebu INHALE THREE MILLILITERS VIA NEBULIZATION BY MOUTH EVERY 6 HOURS AS NEEDED FOR ASTHMA COPD 8/7/20   Victor Manuel Garcia MD   fluticasone propionate (FLONASE) 50 mcg/actuation nasal spray 2 Sprays by Both Nostrils route daily. 8/7/20   Victor Manuel Garcia MD   varenicline (CHANTIX STARTER KIMBERLY) 0.5 mg (11)- 1 mg (42) DsPk Per dose pack instructions 8/7/20   Victor Manuel Garcia MD   varenicline (CHANTIX) 1 mg tablet Take 1 Tab by mouth two (2) times a day.  8/7/20   Victor Manuel Garcia MD trimethoprim-polymyxin b (POLYTRIM) ophthalmic solution Administer 1 Drop to both eyes every four (4) hours. Indications: pink eye from bacterial infection 3/31/20   Jill OENIL, NP   inhalational spacing device (MICROCHAMBER) 1 Each by Does Not Apply route as needed for Cough. 6/11/19   Casi Booker MD          ROS  Complete ROS reviewed and negative or stable except as noted in HPI. Physical Exam  Vitals signs and nursing note reviewed. Constitutional:       General: She is not in acute distress. HENT:      Head: Normocephalic and atraumatic. Mouth/Throat:      Pharynx: No oropharyngeal exudate. Eyes:      General: No scleral icterus. Pupils: Pupils are equal, round, and reactive to light. Neck:      Musculoskeletal: Normal range of motion and neck supple. Thyroid: No thyromegaly. Vascular: No JVD. Cardiovascular:      Rate and Rhythm: Normal rate and regular rhythm. Heart sounds: Normal heart sounds. No murmur. No friction rub. No gallop. Pulmonary:      Effort: Pulmonary effort is normal. No respiratory distress. Breath sounds: Normal breath sounds. No wheezing or rales. Abdominal:      General: Bowel sounds are normal. There is no distension. Palpations: Abdomen is soft. Tenderness: There is no abdominal tenderness. Musculoskeletal: Normal range of motion. General: Deformity (right wrist palpable synovial cyst) present. Lymphadenopathy:      Cervical: No cervical adenopathy. Skin:     General: Skin is warm. Findings: No rash. Neurological:      Mental Status: She is alert and oriented to person, place, and time. Motor: No abnormal muscle tone. Coordination: Coordination normal.           Prior labs reviewed. Reviewed prior imaging reports      Assessment/Plan:    ICD-10-CM ICD-9-CM    1. COPD with asthma ([facility]  J44.9 493.20    2.  Migraine without status migrainosus, not intractable, unspecified migraine type G43.909 346.90 SUMAtriptan (IMITREX) 50 mg tablet      amitriptyline (ELAVIL) 25 mg tablet   3. Sjogren's syndrome, with unspecified organ involvement ([facility]  M35.00 710.2    4. Inflammatory arthritis  M19.90 714.9    5. Seasonal allergic rhinitis, unspecified trigger  J30.2 477.9 loratadine (CLARITIN) 10 mg tablet      fluticasone propionate (FLONASE) 50 mcg/actuation nasal spray   6. Encounter for immunization  Z23 V03.89 ZOSTER VACC RECOMBINANT ADJUVANTED   7. Synovial cyst of right wrist  M71.331 727.40 REFERRAL TO HAND SURGERY     Follow-up and Dispositions    · Return in about 6 months (around 8/9/2021), or if symptoms worsen or fail to improve, for asthma, wellness visit, labs. results and schedule of future studies reviewed with patient  reviewed diet, exercise and weight  cardiovascular risk and specific lipid/LDL goals reviewed  reviewed medications and side effects in detail    Resume flonase  Resume elavil  imitrex prn  Shingrix #2  Discussed covid vaccine  Cautioned re: any oil based vaping  Refill advair. Ref to hand re: synovial cyst treatment options. An electronic signature was used to authenticate this note.   -- Gera Kruse MD

## 2021-02-17 ENCOUNTER — TELEPHONE (OUTPATIENT)
Dept: INTERNAL MEDICINE CLINIC | Age: 61
End: 2021-02-17

## 2021-02-17 NOTE — TELEPHONE ENCOUNTER
PA submitted via [employer] Hazard Karlos ([place]: Naun Curran) - [id number]  Status  Sent to [address]  Next Steps  The plan will fax you a determination, typically within 1 to 5 business days.       Drug  Fluticasone-Salmeterol 250-50MCG/DOSE aerosol powder  Form  [employer] Medicaid Pharmacy/Medical Drug Necessity Form  Prior Authorization Form for Pharmacy/Medical Drug Necessity Requests for [facility] AND SURGICAL [facility]) Members  [phone]  [phone]  [address]

## 2021-02-26 ENCOUNTER — DOCUMENTATION ONLY (OUTPATIENT)
Dept: INTERNAL MEDICINE CLINIC | Age: 61
End: 2021-02-26

## 2021-02-26 NOTE — PROGRESS NOTES
PA initiated for Fluticasone-Salmeterol 250-50MCG/Dose Aerosol Powder. KEY: [id number]      Fax sent to the plan   Your PA has been faxed to the plan as a paper copy. Please contact the plan directly if you haven't received a determination in a typical timeframe. You will be notified of the determination via fax.

## 2021-03-09 DIAGNOSIS — G43.909 MIGRAINE WITHOUT STATUS MIGRAINOSUS, NOT INTRACTABLE, UNSPECIFIED MIGRAINE TYPE: ICD-10-CM

## 2021-03-09 RX ORDER — BUTALBITAL, ACETAMINOPHEN AND CAFFEINE 50; 325; 40 MG/1; MG/1; MG/1
1 TABLET ORAL
Qty: 20 TAB | Refills: 2 | Status: SHIPPED | OUTPATIENT
Start: 2021-03-09 | End: 2021-11-11

## 2021-03-09 RX ORDER — FLUTICASONE PROPIONATE AND SALMETEROL 50; 250 UG/1; UG/1
1 POWDER RESPIRATORY (INHALATION) EVERY 12 HOURS
Qty: 3 INHALER | Refills: 3 | Status: SHIPPED | OUTPATIENT
Start: 2021-03-09 | End: 2022-05-21

## 2021-03-09 NOTE — PROGRESS NOTES
Fax received noting Brand name Advair as preferred on formulary    Rx changes to brand name Advair    Please notify pt

## 2021-03-09 NOTE — PROGRESS NOTES
Pt informed of new inhaler sent to pharmacy and voiced understanding. Pt is requesting a refill on her Fioricet as well.

## 2021-05-10 DIAGNOSIS — E55.9 VITAMIN D DEFICIENCY: ICD-10-CM

## 2021-05-10 RX ORDER — ERGOCALCIFEROL 1.25 MG/1
CAPSULE ORAL
Qty: 10 CAP | Refills: 0 | OUTPATIENT
Start: 2021-05-10

## 2021-07-07 ENCOUNTER — HOSPITAL ENCOUNTER (EMERGENCY)
Age: 61
Discharge: HOME | End: 2021-07-07
Attending: STUDENT IN AN ORGANIZED HEALTH CARE EDUCATION/TRAINING PROGRAM
Payer: MEDICAID

## 2021-07-07 ENCOUNTER — APPOINTMENT (OUTPATIENT)
Dept: CT IMAGING | Age: 61
End: 2021-07-07
Attending: STUDENT IN AN ORGANIZED HEALTH CARE EDUCATION/TRAINING PROGRAM
Payer: MEDICAID

## 2021-07-07 VITALS
WEIGHT: 110 LBS | HEIGHT: 66 IN | OXYGEN SATURATION: 98 % | RESPIRATION RATE: 18 BRPM | DIASTOLIC BLOOD PRESSURE: 67 MMHG | BODY MASS INDEX: 17.68 KG/M2 | TEMPERATURE: 98.5 F | SYSTOLIC BLOOD PRESSURE: 115 MMHG | HEART RATE: 87 BPM

## 2021-07-07 DIAGNOSIS — K57.32 SIGMOID DIVERTICULITIS: Primary | ICD-10-CM

## 2021-07-07 LAB
ALBUMIN SERPL-MCNC: 3.4 G/DL (ref 3.5–5)
ALBUMIN/GLOB SERPL: 0.9 {RATIO} (ref 1.1–2.2)
ALP SERPL-CCNC: 78 U/L (ref 45–117)
ALT SERPL-CCNC: 12 U/L (ref 12–78)
ANION GAP SERPL CALC-SCNC: 2 MMOL/L (ref 5–15)
APPEARANCE UR: CLEAR
AST SERPL-CCNC: 9 U/L (ref 15–37)
BACTERIA URNS QL MICRO: ABNORMAL /HPF
BASOPHILS # BLD: 0 K/UL (ref 0–0.1)
BASOPHILS NFR BLD: 1 % (ref 0–1)
BILIRUB SERPL-MCNC: 0.2 MG/DL (ref 0.2–1)
BILIRUB UR QL: NEGATIVE
BUN SERPL-MCNC: 11 MG/DL (ref 6–20)
BUN/CREAT SERPL: 28 (ref 12–20)
CALCIUM SERPL-MCNC: 9 MG/DL (ref 8.5–10.1)
CHLORIDE SERPL-SCNC: 106 MMOL/L (ref 97–108)
CO2 SERPL-SCNC: 31 MMOL/L (ref 21–32)
COLOR UR: ABNORMAL
COMMENT, HOLDF: NORMAL
CREAT SERPL-MCNC: 0.4 MG/DL (ref 0.55–1.02)
DIFFERENTIAL METHOD BLD: NORMAL
EOSINOPHIL # BLD: 0.1 K/UL (ref 0–0.4)
EOSINOPHIL NFR BLD: 2 % (ref 0–7)
EPITH CASTS URNS QL MICRO: ABNORMAL /LPF
ERYTHROCYTE [DISTWIDTH] IN BLOOD BY AUTOMATED COUNT: 12.9 % (ref 11.5–14.5)
GLOBULIN SER CALC-MCNC: 3.8 G/DL (ref 2–4)
GLUCOSE SERPL-MCNC: 90 MG/DL (ref 65–100)
GLUCOSE UR STRIP.AUTO-MCNC: NEGATIVE MG/DL
HCT VFR BLD AUTO: 38.8 % (ref 35–47)
HGB BLD-MCNC: 12.2 G/DL (ref 11.5–16)
HGB UR QL STRIP: NEGATIVE
HYALINE CASTS URNS QL MICRO: ABNORMAL /LPF (ref 0–5)
IMM GRANULOCYTES # BLD AUTO: 0 K/UL (ref 0–0.04)
IMM GRANULOCYTES NFR BLD AUTO: 0 % (ref 0–0.5)
KETONES UR QL STRIP.AUTO: NEGATIVE MG/DL
LEUKOCYTE ESTERASE UR QL STRIP.AUTO: ABNORMAL
LIPASE SERPL-CCNC: 124 U/L (ref 73–393)
LYMPHOCYTES # BLD: 0.8 K/UL (ref 0.8–3.5)
LYMPHOCYTES NFR BLD: 16 % (ref 12–49)
MCH RBC QN AUTO: 28.4 PG (ref 26–34)
MCHC RBC AUTO-ENTMCNC: 31.4 G/DL (ref 30–36.5)
MCV RBC AUTO: 90.2 FL (ref 80–99)
MONOCYTES # BLD: 0.4 K/UL (ref 0–1)
MONOCYTES NFR BLD: 8 % (ref 5–13)
NEUTS SEG # BLD: 3.8 K/UL (ref 1.8–8)
NEUTS SEG NFR BLD: 73 % (ref 32–75)
NITRITE UR QL STRIP.AUTO: NEGATIVE
NRBC # BLD: 0 K/UL (ref 0–0.01)
NRBC BLD-RTO: 0 PER 100 WBC
PH UR STRIP: 6 [PH] (ref 5–8)
PLATELET # BLD AUTO: 268 K/UL (ref 150–400)
PMV BLD AUTO: 9 FL (ref 8.9–12.9)
POTASSIUM SERPL-SCNC: 4.2 MMOL/L (ref 3.5–5.1)
PROT SERPL-MCNC: 7.2 G/DL (ref 6.4–8.2)
PROT UR STRIP-MCNC: NEGATIVE MG/DL
RBC # BLD AUTO: 4.3 M/UL (ref 3.8–5.2)
RBC #/AREA URNS HPF: ABNORMAL /HPF (ref 0–5)
SAMPLES BEING HELD,HOLD: NORMAL
SODIUM SERPL-SCNC: 139 MMOL/L (ref 136–145)
SP GR UR REFRACTOMETRY: 1.01 (ref 1–1.03)
UR CULT HOLD, URHOLD: NORMAL
UROBILINOGEN UR QL STRIP.AUTO: 0.2 EU/DL (ref 0.2–1)
WBC # BLD AUTO: 5.2 K/UL (ref 3.6–11)
WBC URNS QL MICRO: ABNORMAL /HPF (ref 0–4)

## 2021-07-07 PROCEDURE — 74011000636 HC RX REV CODE- 636: Performed by: RADIOLOGY

## 2021-07-07 PROCEDURE — 96375 TX/PRO/DX INJ NEW DRUG ADDON: CPT

## 2021-07-07 PROCEDURE — 36415 COLL VENOUS BLD VENIPUNCTURE: CPT

## 2021-07-07 PROCEDURE — 96374 THER/PROPH/DIAG INJ IV PUSH: CPT

## 2021-07-07 PROCEDURE — 74011250636 HC RX REV CODE- 250/636: Performed by: STUDENT IN AN ORGANIZED HEALTH CARE EDUCATION/TRAINING PROGRAM

## 2021-07-07 PROCEDURE — 85025 COMPLETE CBC W/AUTO DIFF WBC: CPT

## 2021-07-07 PROCEDURE — 74011250637 HC RX REV CODE- 250/637: Performed by: STUDENT IN AN ORGANIZED HEALTH CARE EDUCATION/TRAINING PROGRAM

## 2021-07-07 PROCEDURE — 74177 CT ABD & PELVIS W/CONTRAST: CPT

## 2021-07-07 PROCEDURE — 80053 COMPREHEN METABOLIC PANEL: CPT

## 2021-07-07 PROCEDURE — 81001 URINALYSIS AUTO W/SCOPE: CPT

## 2021-07-07 PROCEDURE — 83690 ASSAY OF LIPASE: CPT

## 2021-07-07 PROCEDURE — 99283 EMERGENCY DEPT VISIT LOW MDM: CPT

## 2021-07-07 RX ORDER — ONDANSETRON 2 MG/ML
4 INJECTION INTRAMUSCULAR; INTRAVENOUS
Status: COMPLETED | OUTPATIENT
Start: 2021-07-07 | End: 2021-07-07

## 2021-07-07 RX ORDER — ACETAMINOPHEN AND CODEINE PHOSPHATE 300; 30 MG/1; MG/1
1 TABLET ORAL
Qty: 12 TABLET | Refills: 0 | Status: SHIPPED | OUTPATIENT
Start: 2021-07-07 | End: 2021-07-10

## 2021-07-07 RX ORDER — METRONIDAZOLE 500 MG/1
500 TABLET ORAL 3 TIMES DAILY
Qty: 30 TABLET | Refills: 0 | Status: SHIPPED | OUTPATIENT
Start: 2021-07-07 | End: 2021-07-17

## 2021-07-07 RX ORDER — CIPROFLOXACIN 500 MG/1
500 TABLET ORAL
Status: COMPLETED | OUTPATIENT
Start: 2021-07-07 | End: 2021-07-07

## 2021-07-07 RX ORDER — KETOROLAC TROMETHAMINE 30 MG/ML
30 INJECTION, SOLUTION INTRAMUSCULAR; INTRAVENOUS
Status: COMPLETED | OUTPATIENT
Start: 2021-07-07 | End: 2021-07-07

## 2021-07-07 RX ORDER — CIPROFLOXACIN 500 MG/1
500 TABLET ORAL 2 TIMES DAILY
Qty: 20 TABLET | Refills: 0 | Status: SHIPPED | OUTPATIENT
Start: 2021-07-07 | End: 2021-07-17

## 2021-07-07 RX ORDER — METRONIDAZOLE 250 MG/1
500 TABLET ORAL
Status: COMPLETED | OUTPATIENT
Start: 2021-07-07 | End: 2021-07-07

## 2021-07-07 RX ORDER — ONDANSETRON 4 MG/1
4 TABLET, ORALLY DISINTEGRATING ORAL
Qty: 12 TABLET | Refills: 0 | Status: SHIPPED | OUTPATIENT
Start: 2021-07-07

## 2021-07-07 RX ADMIN — ONDANSETRON 4 MG: 2 INJECTION INTRAMUSCULAR; INTRAVENOUS at 12:14

## 2021-07-07 RX ADMIN — METRONIDAZOLE 500 MG: 250 TABLET ORAL at 13:13

## 2021-07-07 RX ADMIN — CIPROFLOXACIN 500 MG: 500 TABLET, FILM COATED ORAL at 13:13

## 2021-07-07 RX ADMIN — SODIUM CHLORIDE 1000 ML: 9 INJECTION, SOLUTION INTRAVENOUS at 12:14

## 2021-07-07 RX ADMIN — IOPAMIDOL 100 ML: 755 INJECTION, SOLUTION INTRAVENOUS at 12:07

## 2021-07-07 RX ADMIN — KETOROLAC TROMETHAMINE 30 MG: 30 INJECTION, SOLUTION INTRAMUSCULAR at 12:14

## 2021-07-07 NOTE — ED TRIAGE NOTES
Pt reports LLQ pain x2 weeks with a mix of soft stools and constipation with accompanying nasuea.  Pt denies v/d.

## 2021-07-07 NOTE — DISCHARGE INSTRUCTIONS
- Your abdominal CT scan indicated evidence of diverticulitis  - Complete the full course of antibiotics as discussed, continue a bland diet until symptoms resolve, drink lots of fluids throughout the day  - Follow up with your primary physician and with GI to be reevaluated, recommend colonoscopy once acute illness has resolved  - Return to the emergency department for fevers, worsening or persistent abdominal pain, vomiting, rectal bleeding, or any new or worsening symptoms

## 2021-07-07 NOTE — ED PROVIDER NOTES
Chief Complaint   Patient presents with    Abdominal Pain     This is a 49-year-old female with a history of lupus and Sjogren's disease, pretension, hyperlipidemia, peptic ulcer disease, migraine headaches, emphysema, previous tobacco use, presenting with left lower quadrant abdominal pain that started 2 weeks ago. She denies any associate fevers or vomiting but has been having some loose stools, no rectal bleeding. Has had some radiation to the left flank, no associated urinary symptoms. Prior abdominal surgery includes a prior  section. No history of diverticulitis in the past, she reports he had a colonoscopy done about 8 years ago at [facility] that was unrevealing. Denies any appetite loss although the pain is worse with eating. No chest pain, shortness of breath, or any other systemic complaints. Symptoms are moderate in nature without alleviating factors. Past Medical History:   Diagnosis Date    Acid indigestion     Alopecia     Chest pain     Depression 3/2/2011    Diverticulosis of colon     Dizziness     Emphysema of lung (HCC)     on CXR    External hemorrhoid     Family history of esophageal cancer     H. pylori infection     Hyperlipidemia     Hyperlipidemia     Inflammatory arthritis     Joint pain     Joint swelling     Lupus ([address]     Lupus ([address]     Menopause     WYP-53-    Migraines     Muscle aches     Osteopenia     DEXA 11    PUD (peptic ulcer disease)     Sicca syndrome ([address]     Sicca syndrome ([address] 2012    Sjogren's disease ([address] 2011    Sjogren's disease ([address] 2011    Sjogrens syndrome ([address]     Stiffness joints     Trouble in sleeping        Past Surgical History:   Procedure Laterality Date    BIOPSY BREAST      Benign    HX BREAST BIOPSY Right 30 years ago? Benign surgical biopsy.     HX  SECTION      x 3    DESI STEREO VAC  BX BREAST RT 1ST LESION W/CLIP AND SPECIMEN Right 2011    Benign Family History:   Problem Relation Age of Onset   [facility] Breast Cancer Mother 48    Cancer Father         throat,stomach       Social History     Socioeconomic History    Marital status: LEGALLY      Spouse name: Robbi Valenzuela Number of children: 3    Years of education: 8    Highest education level: Not on file   Occupational History    Occupation: unemployed   Tobacco Use    Smoking status: Current Every Day Smoker     Packs/day: 0.50     Years: 35.00     Pack years: 17.50     Types: Cigarettes    Smokeless tobacco: Never Used    Tobacco comment: 15 cigs daily    Vaping Use    Vaping Use: Never assessed   Substance and Sexual Activity    Alcohol use: No     Alcohol/week: 0.0 standard drinks    Drug use: No    Sexual activity: Yes     Partners: Male     Comment:  occasional get together   Other Topics Concern     Service No    Blood Transfusions No    Caffeine Concern No    Occupational Exposure Not Asked    Hobby Hazards Not Asked    Sleep Concern Not Asked    Stress Concern Not Asked    Weight Concern Not Asked    Special Diet Not Asked    Back Care Not Asked    Exercise Not Asked    Bike Helmet Not Asked    Seat Belt Yes     Comment: sometimes    Self-Exams Yes   Social History Narrative    Not on file     Social Determinants of Health     Financial Resource Strain:     Difficulty of Paying Living Expenses:    Food Insecurity:     Worried About Running Out of Food in the Last Year:     Ran Out of Food in the Last Year:    Transportation Needs:     Lack of Transportation (Medical):      Lack of Transportation (Non-Medical):    Physical Activity:     Days of Exercise per Week:     Minutes of Exercise per Session:    Stress:     Feeling of Stress :    Social Connections:     Frequency of Communication with Friends and Family:     Frequency of Social Gatherings with Friends and Family:     Attends [employer] Services:     Active Member of Clubs or Organizations:     Attends Club or Organization Meetings:     Marital Status:    Intimate Partner Violence:     Fear of Current or Ex-Partner:     Emotionally Abused:     Physically Abused:     Sexually Abused: ALLERGIES: Mobic [meloxicam], Codeine [codeine], and Voltaren [diclofenac sodium]    Review of Systems   Constitutional: Negative for fever. HENT: Negative for facial swelling. Eyes: Negative for redness. Respiratory: Negative for shortness of breath. Cardiovascular: Negative for chest pain. Gastrointestinal: Positive for abdominal pain and diarrhea. Negative for vomiting. Genitourinary: Negative for difficulty urinating. Musculoskeletal: Positive for back pain. Skin: Negative for rash. Neurological: Negative for headaches. Psychiatric/Behavioral: Negative for confusion.        Vitals:    07/07/21 1002   BP: 115/67   Pulse: 87   Resp: 18   Temp: 98.5 °F (36.9 °C)   SpO2: 98%   Weight: 49.9 kg (110 lb)   Height: 5' 6\" (1.676 m)            Physical Exam  General:  Awake and alert, NAD  HEENT:  NC/AT, equal pupils, moist mucous membranes  Neck:   Normal inspection, full range of motion  Cardiac:  RRR, no murmurs  Respiratory:  Clear bilaterally, no wheezes, rales, rhonchi  Abdomen:  Soft and nondistended, +mildly tender in the left lower quadrant without guarding  Extremities: Warm and well perfused, no peripheral edema  Neuro:  Moving all extremities symmetrically without gross motor deficit  Skin:   No rashes or pallor    RESULTS  Recent Results (from the past 12 hour(s))   URINALYSIS W/MICROSCOPIC    Collection Time: 07/07/21 10:51 AM   Result Value Ref Range    Color YELLOW/STRAW      Appearance CLEAR CLEAR      Specific gravity 1.014 1.003 - 1.030      pH (UA) 6.0 5.0 - 8.0      Protein Negative NEG mg/dL    Glucose Negative NEG mg/dL    Ketone Negative NEG mg/dL    Bilirubin Negative NEG      Blood Negative NEG      Urobilinogen 0.2 0.2 - 1.0 EU/dL    Nitrites Negative NEG      Leukocyte Esterase TRACE (A) NEG      WBC 0-4 0 - 4 /hpf    RBC 0-5 0 - 5 /hpf    Epithelial cells FEW FEW /lpf    Bacteria 4+ (A) NEG /hpf    Hyaline cast 0-2 0 - 5 /lpf   URINE CULTURE HOLD SAMPLE    Collection Time: 07/07/21 10:51 AM    Specimen: Serum; Urine   Result Value Ref Range    Urine culture hold        Urine on hold in Microbiology dept for 2 days. If unpreserved urine is submitted, it cannot be used for addtional testing after 24 hours, recollection will be required. CBC WITH AUTOMATED DIFF    Collection Time: 07/07/21 10:51 AM   Result Value Ref Range    WBC 5.2 3.6 - 11.0 K/uL    RBC 4.30 3.80 - 5.20 M/uL    HGB 12.2 11.5 - 16.0 g/dL    HCT 38.8 35.0 - 47.0 %    MCV 90.2 80.0 - 99.0 FL    MCH 28.4 26.0 - 34.0 PG    MCHC 31.4 30.0 - 36.5 g/dL    RDW 12.9 11.5 - 14.5 %    PLATELET 613 869 - 895 K/uL    MPV 9.0 8.9 - 12.9 FL    NRBC 0.0 0  WBC    ABSOLUTE NRBC 0.00 0.00 - 0.01 K/uL    NEUTROPHILS 73 32 - 75 %    LYMPHOCYTES 16 12 - 49 %    MONOCYTES 8 5 - 13 %    EOSINOPHILS 2 0 - 7 %    BASOPHILS 1 0 - 1 %    IMMATURE GRANULOCYTES 0 0.0 - 0.5 %    ABS. NEUTROPHILS 3.8 1.8 - 8.0 K/UL    ABS. LYMPHOCYTES 0.8 0.8 - 3.5 K/UL    ABS. MONOCYTES 0.4 0.0 - 1.0 K/UL    ABS. EOSINOPHILS 0.1 0.0 - 0.4 K/UL    ABS. BASOPHILS 0.0 0.0 - 0.1 K/UL    ABS. IMM. GRANS. 0.0 0.00 - 0.04 K/UL    DF AUTOMATED     METABOLIC PANEL, COMPREHENSIVE    Collection Time: 07/07/21 10:51 AM   Result Value Ref Range    Sodium 139 136 - 145 mmol/L    Potassium 4.2 3.5 - 5.1 mmol/L    Chloride 106 97 - 108 mmol/L    CO2 31 21 - 32 mmol/L    Anion gap 2 (L) 5 - 15 mmol/L    Glucose 90 65 - 100 mg/dL    BUN 11 6 - 20 MG/DL    Creatinine 0.40 (L) 0.55 - 1.02 MG/DL    BUN/Creatinine ratio 28 (H) 12 - 20      GFR est AA >60 >60 ml/min/1.73m2    GFR est non-AA >60 >60 ml/min/1.73m2    Calcium 9.0 8.5 - 10.1 MG/DL    Bilirubin, total 0.2 0.2 - 1.0 MG/DL    ALT (SGPT) 12 12 - 78 U/L    AST (SGOT) 9 (L) 15 - 37 U/L    Alk. phosphatase 78 45 - 117 U/L    Protein, total 7.2 6.4 - 8.2 g/dL    Albumin 3.4 (L) 3.5 - 5.0 g/dL    Globulin 3.8 2.0 - 4.0 g/dL    A-G Ratio 0.9 (L) 1.1 - 2.2     SAMPLES BEING HELD    Collection Time: 07/07/21 10:51 AM   Result Value Ref Range    SAMPLES BEING HELD 1red     COMMENT        Add-on orders for these samples will be processed based on acceptable specimen integrity and analyte stability, which may vary by analyte. LIPASE    Collection Time: 07/07/21 10:51 AM   Result Value Ref Range    Lipase 124 73 - 393 U/L        IMAGING  CT ABD PELV W CONT    Result Date: 7/7/2021  1. Acute sigmoid diverticulitis without abscess or free air. 2. Other incidental findings. Procedures - none unless documented below    ED course: Labs and imaging reviewed, abdominal CT indicates diverticulitis without free air or abscess, oral cipro/Flagyl given here without emesis, vital signs stable, no signs of sepsis. Pain well controlled. Will have her continue antimicrobial therapy for 10 days, Tylenol with codeine for analgesia, follow up with her primary and with GI for reevaluation. The patient has been given verbal and written advice regarding today's presentation including reasons to re-attend, specifically signs and symptoms of concern or worsening condition were discussed and understood by the patient.      Impression: Acute sigmoid diverticulitis  Disposition: Discharge home

## 2021-07-08 PROBLEM — K57.92 DIVERTICULITIS: Status: ACTIVE | Noted: 2021-07-08

## 2021-08-11 RX ORDER — AMLODIPINE BESYLATE 2.5 MG/1
TABLET ORAL
Qty: 90 TABLET | Refills: 1 | Status: SHIPPED | OUTPATIENT
Start: 2021-08-11 | End: 2022-09-06 | Stop reason: SDUPTHER

## 2021-11-11 DIAGNOSIS — G43.909 MIGRAINE WITHOUT STATUS MIGRAINOSUS, NOT INTRACTABLE, UNSPECIFIED MIGRAINE TYPE: ICD-10-CM

## 2021-11-11 RX ORDER — BUTALBITAL, ACETAMINOPHEN AND CAFFEINE 50; 325; 40 MG/1; MG/1; MG/1
TABLET ORAL
Qty: 20 TABLET | Refills: 2 | Status: SHIPPED | OUTPATIENT
Start: 2021-11-11 | End: 2022-05-31

## 2021-11-11 RX ORDER — SUMATRIPTAN 50 MG/1
TABLET, FILM COATED ORAL
Qty: 18 TABLET | Refills: 2 | Status: SHIPPED | OUTPATIENT
Start: 2021-11-11 | End: 2022-05-31

## 2021-12-07 NOTE — TELEPHONE ENCOUNTER
Pt's scheduled to see [email or web address] on 12/15/21 @ 1:30 for medication and migraine follow-up.

## 2021-12-09 LAB — SARS-COV-2, NAA: POSITIVE

## 2022-02-01 PROBLEM — U07.1 COVID-19: Status: ACTIVE | Noted: 2021-12-09

## 2022-02-25 DIAGNOSIS — G43.909 MIGRAINE WITHOUT STATUS MIGRAINOSUS, NOT INTRACTABLE, UNSPECIFIED MIGRAINE TYPE: ICD-10-CM

## 2022-02-25 RX ORDER — AMITRIPTYLINE HYDROCHLORIDE 25 MG/1
TABLET, FILM COATED ORAL
Qty: 30 TABLET | Refills: 1 | Status: SHIPPED | OUTPATIENT
Start: 2022-02-25 | End: 2022-05-02

## 2022-03-08 ENCOUNTER — OFFICE VISIT (OUTPATIENT)
Dept: INTERNAL MEDICINE CLINIC | Age: 62
End: 2022-03-08
Payer: MEDICAID

## 2022-03-08 VITALS
WEIGHT: 130 LBS | HEART RATE: 92 BPM | HEIGHT: 66 IN | SYSTOLIC BLOOD PRESSURE: 120 MMHG | RESPIRATION RATE: 16 BRPM | BODY MASS INDEX: 20.89 KG/M2 | DIASTOLIC BLOOD PRESSURE: 75 MMHG | OXYGEN SATURATION: 95 % | TEMPERATURE: 97.6 F

## 2022-03-08 DIAGNOSIS — I70.90 ATHEROSCLEROSIS: Primary | ICD-10-CM

## 2022-03-08 DIAGNOSIS — R73.9 HYPERGLYCEMIA: ICD-10-CM

## 2022-03-08 DIAGNOSIS — Z12.31 SCREENING MAMMOGRAM FOR BREAST CANCER: ICD-10-CM

## 2022-03-08 DIAGNOSIS — G43.909 MIGRAINE WITHOUT STATUS MIGRAINOSUS, NOT INTRACTABLE, UNSPECIFIED MIGRAINE TYPE: ICD-10-CM

## 2022-03-08 DIAGNOSIS — M54.50 CHRONIC BILATERAL LOW BACK PAIN WITHOUT SCIATICA: ICD-10-CM

## 2022-03-08 DIAGNOSIS — M19.90 INFLAMMATORY ARTHRITIS: ICD-10-CM

## 2022-03-08 DIAGNOSIS — E53.8 VITAMIN B12 DEFICIENCY: ICD-10-CM

## 2022-03-08 DIAGNOSIS — E55.9 VITAMIN D DEFICIENCY: ICD-10-CM

## 2022-03-08 DIAGNOSIS — J44.9 COPD WITH ASTHMA (HCC): ICD-10-CM

## 2022-03-08 DIAGNOSIS — E78.5 HYPERLIPIDEMIA, UNSPECIFIED HYPERLIPIDEMIA TYPE: ICD-10-CM

## 2022-03-08 DIAGNOSIS — G89.29 CHRONIC BILATERAL LOW BACK PAIN WITHOUT SCIATICA: ICD-10-CM

## 2022-03-08 PROCEDURE — 99215 OFFICE O/P EST HI 40 MIN: CPT | Performed by: INTERNAL MEDICINE

## 2022-03-08 RX ORDER — TRAMADOL HYDROCHLORIDE 50 MG/1
50 TABLET ORAL
Qty: 28 TABLET | Refills: 0 | Status: SHIPPED | OUTPATIENT
Start: 2022-03-08 | End: 2022-03-15

## 2022-03-08 RX ORDER — DULOXETIN HYDROCHLORIDE 30 MG/1
30 CAPSULE, DELAYED RELEASE ORAL DAILY
Qty: 30 CAPSULE | Refills: 5 | Status: SHIPPED | OUTPATIENT
Start: 2022-03-08 | End: 2022-08-29 | Stop reason: SDUPTHER

## 2022-03-08 RX ORDER — TIZANIDINE 4 MG/1
4 TABLET ORAL
Qty: 30 TABLET | Refills: 1 | Status: SHIPPED | OUTPATIENT
Start: 2022-03-08 | End: 2022-09-06 | Stop reason: SDUPTHER

## 2022-03-08 RX ORDER — ROSUVASTATIN CALCIUM 5 MG/1
5 TABLET, COATED ORAL
Qty: 30 TABLET | Refills: 5 | Status: SHIPPED | OUTPATIENT
Start: 2022-03-08 | End: 2022-09-06 | Stop reason: SDUPTHER

## 2022-03-08 NOTE — PROGRESS NOTES
HPI:  established patient  Presents for f/u UC    Seen at [facility] re: back pain  xrays show DDD, DJD as well as aortic atherosclerotic dz and calcifications. No CP, no TORIBIO/SOB    Not smoking  +vaping instead    Back pain persists - not radiating   30% better with steroid taper  Flexeril helps some    +depression screening  Son passed away last year  Difficult  No counseling - pt declines  But, willing to take med    HAs worse    Past medical, Social, and Family history reviewed    Prior to Admission medications    Medication Sig Start Date End Date Taking? Authorizing Provider   amitriptyline (ELAVIL) 25 mg tablet TAKE ONE TABLET BY MOUTH ONCE NIGHTLY 2/25/22  Yes Krystian Dueñas MD   butalbital-acetaminophen-caffeine (FIORICET, ESGIC) -40 mg per tablet TAKE ONE TABLET BY MOUTH EVERY 6 HOURS AS NEEDED FOR HEADACHE 11/11/21  Yes Krystian Dueñas MD   SUMAtriptan (IMITREX) 50 mg tablet TAKE 1 TABLET BY MOUTH ONCE AS NEEDED FOR MIGRAINE 11/11/21  Yes Krystian Dueñas MD   amLODIPine (NORVASC) 2.5 mg tablet TAKE ONE TABLET BY MOUTH DAILY 8/11/21  Yes Krystian Dueñas MD   Advair Diskus 250-50 mcg/dose diskus inhaler Take 1 Puff by inhalation every twelve (12) hours. 3/9/21  Yes Krystian Dueñas MD   loratadine (CLARITIN) 10 mg tablet TAKE ONE TABLET BY MOUTH DAILY 2/9/21  Yes Krystian Dueñas MD   albuterol (ProAir HFA) 90 mcg/actuation inhaler Take 2 Puffs by inhalation every four (4) hours as needed for Wheezing or Shortness of Breath. 8/7/20  Yes Krystian Dueñas MD   ondansetron (Zofran ODT) 4 mg disintegrating tablet Take 1 Tablet by mouth every six (6) hours as needed for Nausea or Vomiting. Patient not taking: Reported on 3/8/2022 7/7/21   Mace Primrose, MD   fluticasone propionate (FLONASE) 50 mcg/actuation nasal spray 2 Sprays by Both Nostrils route daily.   Patient not taking: Reported on 3/8/2022 2/9/21   Krystian Dueñas MD   cholecalciferol (Vitamin D3) (1000 Units /25 mcg) tablet Take 1 Tab by mouth daily. Patient not taking: Reported on 3/8/2022 9/10/20   Dong Martinez MD   cyanocobalamin (VITAMIN B12) 500 mcg tablet Take 1 Tab by mouth daily. Patient not taking: Reported on 3/8/2022 9/10/20   Dong Martinez MD   albuterol-ipratropium (DUO-NEB) 2.5 mg-0.5 mg/3 ml nebu INHALE THREE MILLILITERS VIA NEBULIZATION BY MOUTH EVERY 6 HOURS AS NEEDED FOR ASTHMA COPD  Patient not taking: Reported on 3/8/2022 8/7/20   Dong Martinez MD   varenicline (CHANTIX STARTER KIMBERLY) 0.5 mg (11)- 1 mg (42) DsPk Per dose pack instructions  Patient not taking: Reported on 3/8/2022 8/7/20   Dong Martinez MD   varenicline (CHANTIX) 1 mg tablet Take 1 Tab by mouth two (2) times a day. Patient not taking: Reported on 3/8/2022 8/7/20   Dong Martinez MD   trimethoprim-polymyxin b (POLYTRIM) ophthalmic solution Administer 1 Drop to both eyes every four (4) hours. Indications: pink eye from bacterial infection  Patient not taking: Reported on 3/8/2022 3/31/20   Selam ONEIL, BERTO   inhalational spacing device (MICROCHAMBER) 1 Each by Does Not Apply route as needed for Cough. Patient not taking: Reported on 3/8/2022 6/11/19   Dong Martinez MD          ROS  Complete ROS reviewed and negative or stable except as noted in HPI. Physical Exam  Vitals and nursing note reviewed. Constitutional:       General: She is not in acute distress. HENT:      Head: Normocephalic and atraumatic. Mouth/Throat:      Pharynx: No oropharyngeal exudate. Eyes:      General: No scleral icterus. Pupils: Pupils are equal, round, and reactive to light. Neck:      Thyroid: No thyromegaly. Vascular: No JVD. Cardiovascular:      Rate and Rhythm: Normal rate and regular rhythm. Heart sounds: Normal heart sounds. No murmur heard. No friction rub. No gallop. Pulmonary:      Effort: Pulmonary effort is normal. No respiratory distress. Breath sounds: Normal breath sounds.  No wheezing or rales. Abdominal:      General: Bowel sounds are normal. There is no distension. Palpations: Abdomen is soft. Tenderness: There is no abdominal tenderness. Musculoskeletal:         General: No swelling, tenderness or deformity. Normal range of motion. Cervical back: Normal range of motion and neck supple. Lymphadenopathy:      Cervical: No cervical adenopathy. Skin:     General: Skin is warm. Findings: No rash. Neurological:      Mental Status: She is alert and oriented to person, place, and time. Motor: No abnormal muscle tone. Coordination: Coordination normal.           Prior labs reviewed. Reviewed [facility] notes  Reviewed abd CT 7/2021 imaging myself - +calcifications, no aneurysm of aorta. Assessment/Plan:    ICD-10-CM ICD-9-CM    1. Atherosclerosis  I70.90 440.9    2. Vitamin D deficiency  E55.9 268.9 VITAMIN D, 25 HYDROXY   3. Migraine without status migrainosus, not intractable, unspecified migraine type  G43.909 346.90    4. COPD with asthma ([facility] [address]  J44.9 493.20 CBC WITH AUTOMATED DIFF   5. Vitamin B12 deficiency  E53.8 266.2 VITAMIN B12   6. Inflammatory arthritis  M19.90 714.9 SED RATE (ESR)      C REACTIVE PROTEIN, QT   7. Hyperlipidemia, unspecified hyperlipidemia type  E78.5 263.5 CK      METABOLIC PANEL, COMPREHENSIVE      LIPID PANEL      rosuvastatin (CRESTOR) 5 mg tablet   8. Hyperglycemia  R73.9 790.29 HEMOGLOBIN A1C WITH EAG   9. Chronic bilateral low back pain without sciatica  M54.50 724.2 DULoxetine (CYMBALTA) 30 mg capsule    G89.29 338.29 REFERRAL TO PHYSICAL THERAPY      traMADoL (ULTRAM) 50 mg tablet   10. Screening mammogram for breast cancer  Z12.31 V76.12 [facility] 3D KARY W MAMMO BI SCREENING INCL CAD     Follow-up and Dispositions    · Return in about 2 months (around 5/8/2022) for cholesterol, back pain, mood.         results and schedule of future studies reviewed with patient  reviewed diet, exercise and weight cardiovascular risk and specific lipid/LDL goals reviewed  reviewed medications and side effects in detail    Plan to start crestor 5mg after labs drawn  Start cymbalta for mood and back pain  Tizanidine since compatable with tramadol  Prn tramadol  Ref PT  If inflammatory markers elevated then consider ref to rheum. On this date 03/08/2022 I have spent 40 minutes reviewing previous notes, test results and face to face with the patient discussing the diagnosis and importance of compliance with the treatment plan as well as documenting on the day of the visit. An electronic signature was used to authenticate this note.   -- Karine Plunkett MD

## 2022-03-08 NOTE — PROGRESS NOTES
rm 15    States went to [facility] for back pain had xray states told she had issue with her aorta    Chief Complaint   Patient presents with   Brittnee Avelar ED Follow-up     [facility]     1. Have you been to the ER, urgent care clinic since your last visit? Hospitalized since your last visit? Yes [facility]    2. Have you seen or consulted any other health care providers outside of the [address] [place] [address] since your last visit? Include any pap smears or colon screening.  Yes Where: see above     Visit Vitals  /75 (BP 1 Location: Left arm, BP Patient Position: Sitting, BP Cuff Size: Adult)   Pulse 92   Temp 97.6 °F (36.4 °C) (Oral)   Resp 16   Ht 5' 6\" (1.676 m)   Wt 130 lb (59 kg)   LMP 11/16/2012   SpO2 95%   BMI 20.98 kg/m²

## 2022-03-10 ENCOUNTER — LAB ONLY (OUTPATIENT)
Dept: INTERNAL MEDICINE CLINIC | Age: 62
End: 2022-03-10

## 2022-03-10 DIAGNOSIS — E53.8 VITAMIN B12 DEFICIENCY: ICD-10-CM

## 2022-03-10 DIAGNOSIS — R73.9 HYPERGLYCEMIA: ICD-10-CM

## 2022-03-10 DIAGNOSIS — J44.9 COPD WITH ASTHMA (HCC): ICD-10-CM

## 2022-03-10 DIAGNOSIS — E55.9 VITAMIN D DEFICIENCY: ICD-10-CM

## 2022-03-10 DIAGNOSIS — M19.90 INFLAMMATORY ARTHRITIS: ICD-10-CM

## 2022-03-10 DIAGNOSIS — E78.5 HYPERLIPIDEMIA, UNSPECIFIED HYPERLIPIDEMIA TYPE: ICD-10-CM

## 2022-03-11 LAB
25(OH)D3 SERPL-MCNC: 15 NG/ML (ref 30–100)
ALBUMIN SERPL-MCNC: 3.6 G/DL (ref 3.5–5)
ALBUMIN/GLOB SERPL: 1.3 {RATIO} (ref 1.1–2.2)
ALP SERPL-CCNC: 70 U/L (ref 45–117)
ALT SERPL-CCNC: 13 U/L (ref 12–78)
ANION GAP SERPL CALC-SCNC: 4 MMOL/L (ref 5–15)
AST SERPL-CCNC: 6 U/L (ref 15–37)
BASOPHILS # BLD: 0 K/UL (ref 0–0.1)
BASOPHILS NFR BLD: 1 % (ref 0–1)
BILIRUB SERPL-MCNC: 0.2 MG/DL (ref 0.2–1)
BUN SERPL-MCNC: 10 MG/DL (ref 6–20)
BUN/CREAT SERPL: 22 (ref 12–20)
CALCIUM SERPL-MCNC: 8.8 MG/DL (ref 8.5–10.1)
CHLORIDE SERPL-SCNC: 106 MMOL/L (ref 97–108)
CHOLEST SERPL-MCNC: 210 MG/DL
CK SERPL-CCNC: 22 U/L (ref 26–192)
CO2 SERPL-SCNC: 29 MMOL/L (ref 21–32)
CREAT SERPL-MCNC: 0.46 MG/DL (ref 0.55–1.02)
CRP SERPL-MCNC: <0.29 MG/DL (ref 0–0.6)
DIFFERENTIAL METHOD BLD: ABNORMAL
EOSINOPHIL # BLD: 0.1 K/UL (ref 0–0.4)
EOSINOPHIL NFR BLD: 2 % (ref 0–7)
ERYTHROCYTE [DISTWIDTH] IN BLOOD BY AUTOMATED COUNT: 13.8 % (ref 11.5–14.5)
ERYTHROCYTE [SEDIMENTATION RATE] IN BLOOD: 7 MM/HR (ref 0–30)
EST. AVERAGE GLUCOSE BLD GHB EST-MCNC: 108 MG/DL
GLOBULIN SER CALC-MCNC: 2.8 G/DL (ref 2–4)
GLUCOSE SERPL-MCNC: 85 MG/DL (ref 65–100)
HBA1C MFR BLD: 5.4 % (ref 4–5.6)
HCT VFR BLD AUTO: 41.6 % (ref 35–47)
HDLC SERPL-MCNC: 81 MG/DL
HDLC SERPL: 2.6 {RATIO} (ref 0–5)
HGB BLD-MCNC: 12.4 G/DL (ref 11.5–16)
IMM GRANULOCYTES # BLD AUTO: 0 K/UL (ref 0–0.04)
IMM GRANULOCYTES NFR BLD AUTO: 0 % (ref 0–0.5)
LDLC SERPL CALC-MCNC: 116.4 MG/DL (ref 0–100)
LYMPHOCYTES # BLD: 0.9 K/UL (ref 0.8–3.5)
LYMPHOCYTES NFR BLD: 22 % (ref 12–49)
MCH RBC QN AUTO: 27.4 PG (ref 26–34)
MCHC RBC AUTO-ENTMCNC: 29.8 G/DL (ref 30–36.5)
MCV RBC AUTO: 91.8 FL (ref 80–99)
MONOCYTES # BLD: 0.3 K/UL (ref 0–1)
MONOCYTES NFR BLD: 9 % (ref 5–13)
NEUTS SEG # BLD: 2.6 K/UL (ref 1.8–8)
NEUTS SEG NFR BLD: 66 % (ref 32–75)
NRBC # BLD: 0 K/UL (ref 0–0.01)
NRBC BLD-RTO: 0 PER 100 WBC
PLATELET # BLD AUTO: 268 K/UL (ref 150–400)
PMV BLD AUTO: 10.3 FL (ref 8.9–12.9)
POTASSIUM SERPL-SCNC: 3.9 MMOL/L (ref 3.5–5.1)
PROT SERPL-MCNC: 6.4 G/DL (ref 6.4–8.2)
RBC # BLD AUTO: 4.53 M/UL (ref 3.8–5.2)
SODIUM SERPL-SCNC: 139 MMOL/L (ref 136–145)
TRIGL SERPL-MCNC: 63 MG/DL (ref ?–150)
VIT B12 SERPL-MCNC: 304 PG/ML (ref 193–986)
VLDLC SERPL CALC-MCNC: 12.6 MG/DL
WBC # BLD AUTO: 3.9 K/UL (ref 3.6–11)

## 2022-03-11 NOTE — PROGRESS NOTES
LDL cholesterol is similar to prior levels, but given the findings on the imaging, start the rosuvastatin daily and we will recheck in 2 months.   Vitamin D is low - take an additional 1000 units per day of vitamin D  Other labs are either normal or stable and at goal.  Continue other current medications

## 2022-03-18 PROBLEM — U07.1 COVID-19: Status: ACTIVE | Noted: 2021-12-09

## 2022-03-18 PROBLEM — K57.92 DIVERTICULITIS: Status: ACTIVE | Noted: 2021-07-08

## 2022-04-15 ENCOUNTER — HOSPITAL ENCOUNTER (OUTPATIENT)
Dept: MAMMOGRAPHY | Age: 62
Discharge: HOME | End: 2022-04-15
Attending: INTERNAL MEDICINE
Payer: MEDICAID

## 2022-04-15 DIAGNOSIS — Z12.31 SCREENING MAMMOGRAM FOR BREAST CANCER: ICD-10-CM

## 2022-04-15 PROCEDURE — 77063 BREAST TOMOSYNTHESIS BI: CPT

## 2022-05-02 DIAGNOSIS — G43.909 MIGRAINE WITHOUT STATUS MIGRAINOSUS, NOT INTRACTABLE, UNSPECIFIED MIGRAINE TYPE: ICD-10-CM

## 2022-05-02 RX ORDER — AMITRIPTYLINE HYDROCHLORIDE 25 MG/1
TABLET, FILM COATED ORAL
Qty: 30 TABLET | Refills: 5 | Status: SHIPPED | OUTPATIENT
Start: 2022-05-02

## 2022-05-21 RX ORDER — FLUTICASONE PROPIONATE AND SALMETEROL 50; 250 UG/1; UG/1
POWDER RESPIRATORY (INHALATION)
Qty: 3 EACH | Refills: 1 | Status: SHIPPED | OUTPATIENT
Start: 2022-05-21 | End: 2022-09-06 | Stop reason: SDUPTHER

## 2022-05-27 DIAGNOSIS — G43.909 MIGRAINE WITHOUT STATUS MIGRAINOSUS, NOT INTRACTABLE, UNSPECIFIED MIGRAINE TYPE: ICD-10-CM

## 2022-05-31 RX ORDER — BUTALBITAL, ACETAMINOPHEN AND CAFFEINE 50; 325; 40 MG/1; MG/1; MG/1
TABLET ORAL
Qty: 20 TABLET | Refills: 2 | Status: SHIPPED | OUTPATIENT
Start: 2022-05-31 | End: 2022-09-07 | Stop reason: SDUPTHER

## 2022-05-31 RX ORDER — SUMATRIPTAN 50 MG/1
TABLET, FILM COATED ORAL
Qty: 18 TABLET | Refills: 2 | Status: SHIPPED | OUTPATIENT
Start: 2022-05-31 | End: 2022-08-29 | Stop reason: SDUPTHER

## 2022-08-25 ENCOUNTER — APPOINTMENT (OUTPATIENT)
Dept: GENERAL RADIOLOGY | Age: 62
End: 2022-08-25
Attending: STUDENT IN AN ORGANIZED HEALTH CARE EDUCATION/TRAINING PROGRAM
Payer: MEDICAID

## 2022-08-25 ENCOUNTER — HOSPITAL ENCOUNTER (EMERGENCY)
Age: 62
Discharge: HOME | End: 2022-08-25
Attending: EMERGENCY MEDICINE | Admitting: EMERGENCY MEDICINE
Payer: MEDICAID

## 2022-08-25 VITALS
OXYGEN SATURATION: 95 % | DIASTOLIC BLOOD PRESSURE: 66 MMHG | HEART RATE: 95 BPM | RESPIRATION RATE: 16 BRPM | TEMPERATURE: 97.9 F | SYSTOLIC BLOOD PRESSURE: 133 MMHG

## 2022-08-25 DIAGNOSIS — R07.9 CHEST PAIN, UNSPECIFIED TYPE: Primary | ICD-10-CM

## 2022-08-25 LAB
ALBUMIN SERPL-MCNC: 3.4 G/DL (ref 3.5–5)
ALBUMIN/GLOB SERPL: 1 {RATIO} (ref 1.1–2.2)
ALP SERPL-CCNC: 82 U/L (ref 45–117)
ALT SERPL-CCNC: 17 U/L (ref 12–78)
ANION GAP SERPL CALC-SCNC: 4 MMOL/L (ref 5–15)
AST SERPL-CCNC: 6 U/L (ref 15–37)
BASOPHILS # BLD: 0 K/UL (ref 0–0.1)
BASOPHILS NFR BLD: 1 % (ref 0–1)
BILIRUB SERPL-MCNC: 0.2 MG/DL (ref 0.2–1)
BUN SERPL-MCNC: 13 MG/DL (ref 6–20)
BUN/CREAT SERPL: 26 (ref 12–20)
CALCIUM SERPL-MCNC: 9.1 MG/DL (ref 8.5–10.1)
CHLORIDE SERPL-SCNC: 105 MMOL/L (ref 97–108)
CO2 SERPL-SCNC: 32 MMOL/L (ref 21–32)
COMMENT, HOLDF: NORMAL
CREAT SERPL-MCNC: 0.5 MG/DL (ref 0.55–1.02)
D DIMER PPP FEU-MCNC: 0.23 MG/L FEU (ref 0–0.65)
DIFFERENTIAL METHOD BLD: NORMAL
EOSINOPHIL # BLD: 0.1 K/UL (ref 0–0.4)
EOSINOPHIL NFR BLD: 3 % (ref 0–7)
ERYTHROCYTE [DISTWIDTH] IN BLOOD BY AUTOMATED COUNT: 12.7 % (ref 11.5–14.5)
GLOBULIN SER CALC-MCNC: 3.5 G/DL (ref 2–4)
GLUCOSE SERPL-MCNC: 109 MG/DL (ref 65–100)
HCT VFR BLD AUTO: 39.9 % (ref 35–47)
HGB BLD-MCNC: 12.9 G/DL (ref 11.5–16)
IMM GRANULOCYTES # BLD AUTO: 0 K/UL (ref 0–0.04)
IMM GRANULOCYTES NFR BLD AUTO: 0 % (ref 0–0.5)
LIPASE SERPL-CCNC: 144 U/L (ref 73–393)
LYMPHOCYTES # BLD: 0.8 K/UL (ref 0.8–3.5)
LYMPHOCYTES NFR BLD: 22 % (ref 12–49)
MCH RBC QN AUTO: 29 PG (ref 26–34)
MCHC RBC AUTO-ENTMCNC: 32.3 G/DL (ref 30–36.5)
MCV RBC AUTO: 89.7 FL (ref 80–99)
MONOCYTES # BLD: 0.3 K/UL (ref 0–1)
MONOCYTES NFR BLD: 7 % (ref 5–13)
NEUTS SEG # BLD: 2.5 K/UL (ref 1.8–8)
NEUTS SEG NFR BLD: 67 % (ref 32–75)
NRBC # BLD: 0 K/UL (ref 0–0.01)
NRBC BLD-RTO: 0 PER 100 WBC
PLATELET # BLD AUTO: 215 K/UL (ref 150–400)
PMV BLD AUTO: 9.5 FL (ref 8.9–12.9)
POTASSIUM SERPL-SCNC: 3.8 MMOL/L (ref 3.5–5.1)
PROT SERPL-MCNC: 6.9 G/DL (ref 6.4–8.2)
RBC # BLD AUTO: 4.45 M/UL (ref 3.8–5.2)
SAMPLES BEING HELD,HOLD: NORMAL
SARS-COV-2, COV2: NORMAL
SARS-COV-2, XPLCVT: NOT DETECTED
SODIUM SERPL-SCNC: 141 MMOL/L (ref 136–145)
SOURCE, COVRS: NORMAL
TROPONIN-HIGH SENSITIVITY: 4 NG/L (ref 0–51)
TROPONIN-HIGH SENSITIVITY: 4 NG/L (ref 0–51)
WBC # BLD AUTO: 3.7 K/UL (ref 3.6–11)

## 2022-08-25 PROCEDURE — 96374 THER/PROPH/DIAG INJ IV PUSH: CPT

## 2022-08-25 PROCEDURE — 84484 ASSAY OF TROPONIN QUANT: CPT

## 2022-08-25 PROCEDURE — 99285 EMERGENCY DEPT VISIT HI MDM: CPT

## 2022-08-25 PROCEDURE — 93005 ELECTROCARDIOGRAM TRACING: CPT

## 2022-08-25 PROCEDURE — 85025 COMPLETE CBC W/AUTO DIFF WBC: CPT

## 2022-08-25 PROCEDURE — 83690 ASSAY OF LIPASE: CPT

## 2022-08-25 PROCEDURE — U0005 INFEC AGEN DETEC AMPLI PROBE: HCPCS

## 2022-08-25 PROCEDURE — 74011250636 HC RX REV CODE- 250/636: Performed by: EMERGENCY MEDICINE

## 2022-08-25 PROCEDURE — 96375 TX/PRO/DX INJ NEW DRUG ADDON: CPT

## 2022-08-25 PROCEDURE — C9113 INJ PANTOPRAZOLE SODIUM, VIA: HCPCS | Performed by: EMERGENCY MEDICINE

## 2022-08-25 PROCEDURE — 36415 COLL VENOUS BLD VENIPUNCTURE: CPT

## 2022-08-25 PROCEDURE — 80053 COMPREHEN METABOLIC PANEL: CPT

## 2022-08-25 PROCEDURE — 71046 X-RAY EXAM CHEST 2 VIEWS: CPT

## 2022-08-25 PROCEDURE — 74011000250 HC RX REV CODE- 250: Performed by: EMERGENCY MEDICINE

## 2022-08-25 PROCEDURE — 85379 FIBRIN DEGRADATION QUANT: CPT

## 2022-08-25 RX ORDER — SUCRALFATE 1 G/10ML
1 SUSPENSION ORAL 4 TIMES DAILY
Qty: 400 ML | Refills: 0 | Status: SHIPPED | OUTPATIENT
Start: 2022-08-25

## 2022-08-25 RX ORDER — KETOROLAC TROMETHAMINE 30 MG/ML
15 INJECTION, SOLUTION INTRAMUSCULAR; INTRAVENOUS
Status: COMPLETED | OUTPATIENT
Start: 2022-08-25 | End: 2022-08-25

## 2022-08-25 RX ORDER — ONDANSETRON 2 MG/ML
4 INJECTION INTRAMUSCULAR; INTRAVENOUS
Status: COMPLETED | OUTPATIENT
Start: 2022-08-25 | End: 2022-08-25

## 2022-08-25 RX ADMIN — ONDANSETRON HYDROCHLORIDE 4 MG: 2 SOLUTION INTRAMUSCULAR; INTRAVENOUS at 12:12

## 2022-08-25 RX ADMIN — KETOROLAC TROMETHAMINE 15 MG: 30 INJECTION, SOLUTION INTRAMUSCULAR; INTRAVENOUS at 12:00

## 2022-08-25 RX ADMIN — SODIUM CHLORIDE 40 MG: 9 INJECTION INTRAMUSCULAR; INTRAVENOUS; SUBCUTANEOUS at 12:00

## 2022-08-25 NOTE — ED PROVIDER NOTES
Chest Pain (Angina)   Pertinent negatives include no abdominal pain, no cough, no dizziness, no fever, no headaches, no nausea, no shortness of breath and no vomiting. Patient is a 70-year-old female with past medical history significant for GERD, alopecia, chest pain, COVID-19, diverticulitis, emphysema, hemorrhoids, hyperlipidemia, arthritis, lupus, migraines and osteopenia who presents to the ED reporting abrupt onset of epigastric area chest pain. Denies any fever, difficulty breathing, difficulty swallowing, SOB or abdominal pain. Denies any nausea, vomiting or diarrhea. Denies cold symptoms, headache, neck pain, visual changes, focal weakness or rash. Pt. Reports taking motrin without relief. Old charts reviewed. Past Medical History:   Diagnosis Date    Acid indigestion     Alopecia     Chest pain     COVID-19 2021    Depression 3/2/2011    Diverticulitis 2021    Diverticulosis of colon     Dizziness     Emphysema of lung (HCC)     on CXR    External hemorrhoid     Family history of esophageal cancer     H. pylori infection     Hyperlipidemia     Hyperlipidemia     Inflammatory arthritis     Joint pain     Joint swelling     Lupus ([address]     Lupus ([address]     Menopause     [id number]    Migraines     Muscle aches     Osteopenia     DEXA 11    PUD (peptic ulcer disease)     Sicca syndrome ([address]     Sicca syndrome ([address] 2012    Sjogren's disease ([address] 2011    Sjogren's disease ([address] 2011    Sjogrens syndrome ([address]     Stiffness joints     Trouble in sleeping        Past Surgical History:   Procedure Laterality Date    BIOPSY BREAST  2011    Benign    HX BREAST BIOPSY Right 30 years ago? Benign surgical biopsy.     HX  SECTION      x 3    DESI STEREO VAC  BX BREAST RT 1ST LESION W/CLIP AND SPECIMEN Right 2011    Benign         Family History:   Problem Relation Age of Onset   [facility] Breast Cancer Mother 48    Cancer Father throat,stomach       Social History     Socioeconomic History    Marital status: LEGALLY      Spouse name: Nusrat Quintanilla Number of children: 3    Years of education: 8    Highest education level: Not on file   Occupational History    Occupation: unemployed   Tobacco Use    Smoking status: Former     Packs/day: 0.50     Years: 35.00     Pack years: 17.50     Types: Cigarettes    Smokeless tobacco: Never    Tobacco comments:     15 cigs daily    Vaping Use    Vaping Use: Not on file   Substance and Sexual Activity    Alcohol use: No     Alcohol/week: 0.0 standard drinks    Drug use: No    Sexual activity: Yes     Partners: Male     Comment:  occasional get together   Other Topics Concern     Service No    Blood Transfusions No    Caffeine Concern No    Occupational Exposure Not Asked    Hobby Hazards Not Asked    Sleep Concern Not Asked    Stress Concern Not Asked    Weight Concern Not Asked    Special Diet Not Asked    Back Care Not Asked    Exercise Not Asked    Bike Helmet Not Asked    Seat Belt Yes     Comment: sometimes    Self-Exams Yes   Social History Narrative    Not on file     Social Determinants of Health     Financial Resource Strain: Not on file   Food Insecurity: Not on file   Transportation Needs: Not on file   Physical Activity: Not on file   Stress: Not on file   Social Connections: Not on file   Intimate Partner Violence: Not on file   Housing Stability: Not on file         ALLERGIES: Mobic [meloxicam], Codeine [codeine], and Voltaren [diclofenac sodium]    Review of Systems   Constitutional:  Negative for activity change, appetite change, fever and unexpected weight change. HENT:  Negative for congestion, rhinorrhea, sore throat and trouble swallowing. Eyes:  Negative for visual disturbance. Respiratory:  Negative for cough and shortness of breath. Cardiovascular:  Positive for chest pain.    Gastrointestinal:  Negative for abdominal pain, diarrhea, nausea and vomiting. Genitourinary:  Negative for dysuria and flank pain. Skin:  Negative for rash. Neurological:  Negative for dizziness, light-headedness and headaches. All other systems reviewed and are negative. Vitals:    08/25/22 1045   BP: 133/66   Pulse: 95   Resp: 16   Temp: 97.9 °F (36.6 °C)   SpO2: 95%            Physical Exam  Vitals and nursing note reviewed. Constitutional:       General: She is not in acute distress. Appearance: She is well-developed. She is not ill-appearing, toxic-appearing or diaphoretic. Comments: White female; former smoker continues to vape; unemployed   HENT:      Head: Normocephalic. Cardiovascular:      Rate and Rhythm: Normal rate and regular rhythm. Heart sounds: Normal heart sounds. Pulmonary:      Effort: Pulmonary effort is normal.      Breath sounds: Normal breath sounds. Chest:      Chest wall: Tenderness present. Abdominal:      General: Bowel sounds are normal.   Musculoskeletal:         General: Normal range of motion. Cervical back: Normal range of motion and neck supple. Right lower leg: No tenderness. No edema. Left lower leg: No tenderness. No edema. Lymphadenopathy:      Cervical: No cervical adenopathy. Skin:     General: Skin is warm and dry. Findings: No rash. Neurological:      General: No focal deficit present. Mental Status: She is alert and oriented to person, place, and time. Psychiatric:         Mood and Affect: Mood normal.         Behavior: Behavior normal.        [facility]  EKG reveals normal sinus rhythm with a ventricular rate of 91; No STEMI. Reveiwed by Dr. Reba Grant.        Procedures    XR CHEST PA LAT    Result Date: 8/25/2022  No acute process or change compared to the prior exam.    Labs Reviewed   METABOLIC PANEL, COMPREHENSIVE - Abnormal; Notable for the following components:       Result Value    Anion gap 4 (*)     Glucose 109 (*)     Creatinine 0.50 (*) BUN/Creatinine ratio 26 (*)     AST (SGOT) 6 (*)     Albumin 3.4 (*)     A-G Ratio 1.0 (*)     All other components within normal limits   CBC WITH AUTOMATED DIFF   SAMPLES BEING HELD   TROPONIN-HIGH SENSITIVITY   D DIMER   LIPASE   SARS-COV-2   TROPONIN-HIGH SENSITIVITY   SARS-COV-2     Patient has been reexamined and is presently pain-free. Recommend close follow-up with PCP and gastroenterology for further evaluation and possible endoscopy. Patient's results  and plan of care have been reviewed with her. Patient has verbally conveyed her understanding and agreement of her signs, symptoms, diagnosis, treatment and prognosis and additionally agrees to follow up as recommended or return to the Emergency Room should her condition change prior to follow-up. Discharge instructions have also been provided to the patient with some educational information regarding her diagnosis as well a list of reasons why she would want to return to the ER prior to her follow-up appointment should her condition change. Kyle Barillas, NP

## 2022-08-25 NOTE — ED TRIAGE NOTES
Pt c/o mid chest pain x 2 days with sob and cough, denies fever, pt stated she went into [facility] last week and her oxygen levels were \"low\", +nausea

## 2022-08-26 LAB
ATRIAL RATE: 91 BPM
CALCULATED P AXIS, ECG09: 90 DEGREES
CALCULATED R AXIS, ECG10: 83 DEGREES
CALCULATED T AXIS, ECG11: 67 DEGREES
DIAGNOSIS, 93000: NORMAL
P-R INTERVAL, ECG05: 148 MS
Q-T INTERVAL, ECG07: 358 MS
QRS DURATION, ECG06: 84 MS
QTC CALCULATION (BEZET), ECG08: 440 MS
VENTRICULAR RATE, ECG03: 91 BPM

## 2022-08-29 DIAGNOSIS — G43.909 MIGRAINE WITHOUT STATUS MIGRAINOSUS, NOT INTRACTABLE, UNSPECIFIED MIGRAINE TYPE: ICD-10-CM

## 2022-08-29 DIAGNOSIS — M54.50 CHRONIC BILATERAL LOW BACK PAIN WITHOUT SCIATICA: ICD-10-CM

## 2022-08-29 DIAGNOSIS — G89.29 CHRONIC BILATERAL LOW BACK PAIN WITHOUT SCIATICA: ICD-10-CM

## 2022-08-29 NOTE — TELEPHONE ENCOUNTER
Last visit 03/08/2022 MD Mookie Martinez   Next appointment 09/06/2022 MD Sheng Mascorro   Previous refill encounter(s)   03/08/2022 Cymbalta #30 with 5 refills,   05/31/2022:  - Imitrex #18 with 2 refills,   - Fioricet #20 with 2 refills,     No acces to [facility]    Intervention Detail: New Rx: 3, reason: Patient Preference  Time Spent (min): 5      Requested Prescriptions     Pending Prescriptions Disp Refills    butalbital-acetaminophen-caffeine (FIORICET, ESGIC) -40 mg per tablet [Pharmacy Med Name: BUTALBITAL-ACETAMN-CAF -40 TB] 20 Tablet 0     Sig: TAKE ONE TABLET BY MOUTH EVERY 6 HOURS AS NEEDED FOR HEADACHE    DULoxetine (CYMBALTA) 30 mg capsule 90 Capsule 0     Sig: Take 1 Capsule by mouth daily. SUMAtriptan (IMITREX) 50 mg tablet 18 Tablet 0     Sig: TAKE 1 TABLET BY MOUTH ONCE AS NEEDED FOR MIGRAINE         ----- Message from Filemon Bryant sent at 8/29/2022 12:03 PM EDT -----  Subject: Refill Request    QUESTIONS  Name of Medication? butalbital-acetaminophen-caffeine (FIORICET, ESGIC)   -40 mg per tablet  Patient-reported dosage and instructions? as needed   How many days do you have left? 0  Preferred Pharmacy? [facility]  [id number]  Pharmacy phone number (if available)? [phone]  ---------------------------------------------------------------------------  --------------,  Name of Medication? DULoxetine (CYMBALTA) 30 mg capsule  Patient-reported dosage and instructions? 1/day   How many days do you have left? 15  Preferred Pharmacy? [facility]  [id number]  Pharmacy phone number (if available)? [phone]  ---------------------------------------------------------------------------  --------------,  Name of Medication? SUMAtriptan (IMITREX) 50 mg tablet  Patient-reported dosage and instructions? as needed for migraines. How many days do you have left? 1  Preferred Pharmacy? [facility] [id number]  Pharmacy phone number (if available)? [phone]  Additional Information for Provider? was Dr Estefanía Goldman patient and cant get in   for new patient appt till November. needs 3 medication refills   ---------------------------------------------------------------------------  --------------  CALL BACK INFO  What is the best way for the office to contact you? OK to leave message on   voicemail  Preferred Call Back Phone Number? [phone]  ---------------------------------------------------------------------------  --------------  SCRIPT ANSWERS  Relationship to Patient?  Self

## 2022-08-31 RX ORDER — DULOXETIN HYDROCHLORIDE 30 MG/1
30 CAPSULE, DELAYED RELEASE ORAL DAILY
Qty: 30 CAPSULE | Refills: 0 | Status: SHIPPED | OUTPATIENT
Start: 2022-08-31

## 2022-08-31 RX ORDER — SUMATRIPTAN 50 MG/1
TABLET, FILM COATED ORAL
Qty: 18 TABLET | Refills: 0 | Status: SHIPPED | OUTPATIENT
Start: 2022-08-31 | End: 2022-09-06 | Stop reason: SDUPTHER

## 2022-08-31 RX ORDER — BUTALBITAL, ACETAMINOPHEN AND CAFFEINE 50; 325; 40 MG/1; MG/1; MG/1
TABLET ORAL
Qty: 20 TABLET | Refills: 0 | OUTPATIENT
Start: 2022-08-31

## 2022-09-06 ENCOUNTER — OFFICE VISIT (OUTPATIENT)
Dept: PRIMARY CARE CLINIC | Age: 62
End: 2022-09-06
Payer: MEDICAID

## 2022-09-06 VITALS
OXYGEN SATURATION: 96 % | HEART RATE: 66 BPM | DIASTOLIC BLOOD PRESSURE: 83 MMHG | SYSTOLIC BLOOD PRESSURE: 161 MMHG | BODY MASS INDEX: 21.73 KG/M2 | RESPIRATION RATE: 18 BRPM | HEIGHT: 66 IN | WEIGHT: 135.2 LBS | TEMPERATURE: 98.2 F

## 2022-09-06 DIAGNOSIS — J44.9 COPD WITH ASTHMA (HCC): ICD-10-CM

## 2022-09-06 DIAGNOSIS — E78.5 HYPERLIPIDEMIA, UNSPECIFIED HYPERLIPIDEMIA TYPE: ICD-10-CM

## 2022-09-06 DIAGNOSIS — R07.89 ATYPICAL CHEST PAIN: ICD-10-CM

## 2022-09-06 DIAGNOSIS — E55.9 VITAMIN D DEFICIENCY: ICD-10-CM

## 2022-09-06 DIAGNOSIS — Z72.0 TOBACCO USE: ICD-10-CM

## 2022-09-06 DIAGNOSIS — G43.909 MIGRAINE WITHOUT STATUS MIGRAINOSUS, NOT INTRACTABLE, UNSPECIFIED MIGRAINE TYPE: ICD-10-CM

## 2022-09-06 DIAGNOSIS — F17.201 TOBACCO DEPENDENCE IN REMISSION: Primary | ICD-10-CM

## 2022-09-06 DIAGNOSIS — E53.8 VITAMIN B12 DEFICIENCY: ICD-10-CM

## 2022-09-06 DIAGNOSIS — I10 ESSENTIAL HYPERTENSION: ICD-10-CM

## 2022-09-06 PROCEDURE — 99204 OFFICE O/P NEW MOD 45 MIN: CPT | Performed by: FAMILY MEDICINE

## 2022-09-06 RX ORDER — SUMATRIPTAN 50 MG/1
TABLET, FILM COATED ORAL
Qty: 18 TABLET | Refills: 0 | Status: SHIPPED | OUTPATIENT
Start: 2022-09-06

## 2022-09-06 RX ORDER — TIZANIDINE 4 MG/1
4 TABLET ORAL
Qty: 30 TABLET | Refills: 1 | Status: SHIPPED | OUTPATIENT
Start: 2022-09-06

## 2022-09-06 RX ORDER — PHENOL/SODIUM PHENOLATE
20 AEROSOL, SPRAY (ML) MUCOUS MEMBRANE DAILY
Qty: 30 TABLET | Refills: 1 | Status: SHIPPED | OUTPATIENT
Start: 2022-09-06 | End: 2022-11-02

## 2022-09-06 RX ORDER — MELATONIN
1000 DAILY
Qty: 30 TABLET | Refills: 11 | Status: SHIPPED | OUTPATIENT
Start: 2022-09-06

## 2022-09-06 RX ORDER — AMLODIPINE BESYLATE 5 MG/1
2.5 TABLET ORAL DAILY
Qty: 30 TABLET | Refills: 5 | Status: SHIPPED | OUTPATIENT
Start: 2022-09-06

## 2022-09-06 RX ORDER — ALBUTEROL SULFATE 90 UG/1
2 AEROSOL, METERED RESPIRATORY (INHALATION)
Qty: 1 EACH | Refills: 2 | Status: SHIPPED | OUTPATIENT
Start: 2022-09-06

## 2022-09-06 RX ORDER — IPRATROPIUM BROMIDE AND ALBUTEROL SULFATE 2.5; .5 MG/3ML; MG/3ML
SOLUTION RESPIRATORY (INHALATION)
Qty: 90 EACH | Refills: 3 | Status: SHIPPED | OUTPATIENT
Start: 2022-09-06

## 2022-09-06 RX ORDER — FLUTICASONE PROPIONATE AND SALMETEROL 50; 250 UG/1; UG/1
1 POWDER RESPIRATORY (INHALATION) 2 TIMES DAILY
Qty: 3 EACH | Refills: 3 | Status: SHIPPED | OUTPATIENT
Start: 2022-09-06

## 2022-09-06 RX ORDER — LANOLIN ALCOHOL/MO/W.PET/CERES
500 CREAM (GRAM) TOPICAL DAILY
Qty: 30 TABLET | Refills: 5 | Status: SHIPPED | OUTPATIENT
Start: 2022-09-06

## 2022-09-06 RX ORDER — ROSUVASTATIN CALCIUM 5 MG/1
5 TABLET, COATED ORAL
Qty: 30 TABLET | Refills: 5 | Status: SHIPPED | OUTPATIENT
Start: 2022-09-06

## 2022-09-06 NOTE — PROGRESS NOTES
Chief Complaint   Patient presents with    Establish Care     3 most recent PHQ Screens 9/6/2022   Little interest or pleasure in doing things Not at all   Feeling down, depressed, irritable, or hopeless Not at all   Total Score PHQ 2 0     Abuse Screening Questionnaire 9/6/2022   Do you ever feel afraid of your partner? N   Are you in a relationship with someone who physically or mentally threatens you? N   Is it safe for you to go home? Y     Visit Vitals  BP (!) 142/82 (BP 1 Location: Right upper arm, BP Patient Position: Sitting, BP Cuff Size: Small infant)   Pulse 66   Temp 98.2 °F (36.8 °C) (Oral)   Resp 18   Ht 5' 6\" (1.676 m)   Wt 135 lb 3.2 oz (61.3 kg)   SpO2 96%   BMI 21.82 kg/m²     1. \"Have you been to the ER, urgent care clinic since your last visit? Hospitalized since your last visit? \" 08/25/22 chest    2. \"Have you seen or consulted any other health care providers outside of the [address] [place] [address] since your last visit? \"  no    3. For patients aged 39-70: Has the patient had a colonoscopy / FIT/ Cologuard? 12/21     If the patient is female:    4. For patients aged 41-77: Has the patient had a mammogram within the past 2 years? Up to date    5.  For patients aged 21-65: Has the patient had a pap smear? needed

## 2022-09-06 NOTE — PROGRESS NOTES
HPI     Chief Complaint   Patient presents with    Establish [facility] [place]     Throat discomfort     She is a 64 y.o. female who presents to establish care. Hx Migraines, PUD, Depression and anxiety, hx Sjogren's disease, SLE, HLD, COPD and asthma, Vit B12 def. Chronic LBP. Has chronic intermittent problems with sciatica. Hx diverticulitis s/p partial colectomy 12/2021. Today c/o constipation recently. Nausea. Ongoing about 2 weeks. No abdominal pain. No signs of bleeding. No urinary symptoms. Also c/o chest pains, intermittent sharp pains in left chest. Sometimes associated with physical activity, sometimes occurs at rest. No dyspnea. Intermittent for a few weeks. Hx tobacco use, quit 2 years ago. Smoked 2 ppd for about 30 years. Evaluated in ER for these problems on 8/25/22. Workup including labs and xray was negative. Establishing Care  - Chronic medical problems:  Past Medical History:   Diagnosis Date    Acid indigestion     Alopecia     Chest pain     COVID-19 12/9/2021    Depression 3/2/2011    Diverticulitis 7/8/2021    Diverticulosis of colon     Dizziness     Emphysema of lung ([address]     on CXR    External hemorrhoid     Family history of esophageal cancer     H. pylori infection     Hyperlipidemia     Hyperlipidemia     Inflammatory arthritis     Joint pain     Joint swelling     Lupus ([address]     Lupus ([address]     Menopause     AVR--    Migraines     Muscle aches     Osteopenia     DEXA 5/9/11    PUD (peptic ulcer disease)     Sicca syndrome ([address]     Sicca syndrome ([address] 9/7/2012    Sjogren's disease ([address] 11/21/2011    Sjogren's disease ([address] 11/21/2011    Sjogrens syndrome (HCC)     Stiffness joints     Trouble in sleeping      - Current medications:   Current Outpatient Medications   Medication Sig    DULoxetine (CYMBALTA) 30 mg capsule Take 1 Capsule by mouth daily.     SUMAtriptan (IMITREX) 50 mg tablet TAKE 1 TABLET BY MOUTH ONCE AS NEEDED FOR MIGRAINE    amitriptyline (ELAVIL) 25 mg tablet TAKE ONE TABLET BY MOUTH ONCE NIGHTLY    rosuvastatin (CRESTOR) 5 mg tablet Take 1 Tablet by mouth nightly. tiZANidine (ZANAFLEX) 4 mg tablet Take 1 Tablet by mouth three (3) times daily as needed for Muscle Spasm(s). sucralfate (Carafate) 100 mg/mL suspension Take 10 mL by mouth four (4) times daily. (Patient not taking: Reported on 9/6/2022)    butalbital-acetaminophen-caffeine (FIORICET, ESGIC) -40 mg per tablet TAKE ONE TABLET BY MOUTH EVERY 6 HOURS AS NEEDED FOR HEADACHE (Patient not taking: Reported on 9/6/2022)    Advair Diskus 250-50 mcg/dose diskus inhaler INHALE ONE PUFF BY MOUTH EVERY 12 HOURS (Patient not taking: Reported on 9/6/2022)    amLODIPine (NORVASC) 2.5 mg tablet TAKE ONE TABLET BY MOUTH DAILY (Patient not taking: Reported on 9/6/2022)    ondansetron (Zofran ODT) 4 mg disintegrating tablet Take 1 Tablet by mouth every six (6) hours as needed for Nausea or Vomiting. (Patient not taking: No sig reported)    loratadine (CLARITIN) 10 mg tablet TAKE ONE TABLET BY MOUTH DAILY (Patient not taking: Reported on 9/6/2022)    fluticasone propionate (FLONASE) 50 mcg/actuation nasal spray 2 Sprays by Both Nostrils route daily. (Patient not taking: No sig reported)    cholecalciferol (Vitamin D3) (1000 Units /25 mcg) tablet Take 1 Tab by mouth daily. (Patient not taking: No sig reported)    cyanocobalamin (VITAMIN B12) 500 mcg tablet Take 1 Tab by mouth daily. (Patient not taking: No sig reported)    albuterol (ProAir HFA) 90 mcg/actuation inhaler Take 2 Puffs by inhalation every four (4) hours as needed for Wheezing or Shortness of Breath. (Patient not taking: Reported on 9/6/2022)    albuterol-ipratropium (DUO-NEB) 2.5 mg-0.5 mg/3 ml nebu INHALE THREE MILLILITERS VIA NEBULIZATION BY MOUTH EVERY 6 HOURS AS NEEDED FOR ASTHMA COPD (Patient not taking: Reported on 3/8/2022)    inhalational spacing device (MICROCHAMBER) 1 Each by Does Not Apply route as needed for Cough.  (Patient not taking: No sig reported)     No current facility-administered medications for this visit. - Family history:   Family History   Problem Relation Age of Onset    Breast Cancer Mother 48    Cancer Father         throat,stomach     - Allergies: Allergies   Allergen Reactions    Mobic [Meloxicam] Diarrhea    Codeine [Codeine] Diarrhea    Voltaren [Diclofenac Sodium] Diarrhea     - Surgical history:   Past Surgical History:   Procedure Laterality Date    BIOPSY BREAST  2011    Benign    HX BREAST BIOPSY Right 30 years ago? Benign surgical biopsy.     HX  SECTION      x 3    DESI STEREO VAC  BX BREAST RT 1ST LESION W/CLIP AND SPECIMEN Right 2011    Benign     - Social history (sexually active, occupation, smoker, etoh use, etc):   Social History     Socioeconomic History    Marital status: LEGALLY      Spouse name: Blanca Crespo    Number of children: 3    Years of education: 10    Highest education level: Not on file   Occupational History    Occupation: unemployed   Tobacco Use    Smoking status: Former     Packs/day: 0.50     Years: 35.00     Pack years: 17.50     Types: Cigarettes    Smokeless tobacco: Never    Tobacco comments:     15 cigs daily    Vaping Use    Vaping Use: Not on file   Substance and Sexual Activity    Alcohol use: No     Alcohol/week: 0.0 standard drinks    Drug use: No    Sexual activity: Yes     Partners: Male     Comment:  occasional get together   Other Topics Concern     Service No    Blood Transfusions No    Caffeine Concern No    Occupational Exposure Not Asked    Hobby Hazards Not Asked    Sleep Concern Not Asked    Stress Concern Not Asked    Weight Concern Not Asked    Special Diet Not Asked    Back Care Not Asked    Exercise Not Asked    Bike Helmet Not Asked    Seat Belt Yes     Comment: sometimes    Self-Exams Yes   Social History Narrative    Not on file     Social Determinants of Health     Financial Resource Strain: Low Risk     Difficulty of Paying Living Expenses: Not hard at all   Food Insecurity: No Food Insecurity    Worried About Running Out of Food in the Last Year: Never true    Ran Out of Food in the Last Year: Never true   Transportation Needs: Not on file   Physical Activity: Not on file   Stress: Not on file   Social Connections: Not on file   Intimate Partner Violence: Not on file   Housing Stability: Not on file         Review of Systems  Denies fever, chills, shortness of breath, persistent cough. denies abd pain, vomiting, or abnormal signs of bleeding. Denies urinary symptoms. Reviewed PmHx, RxHx, FmHx, SocHx, AllgHx and updated and dated in the chart. Physical Exam:  Visit Vitals  BP (!) 161/83   Pulse 66   Temp 98.2 °F (36.8 °C) (Oral)   Resp 18   Ht 5' 6\" (1.676 m)   Wt 135 lb 3.2 oz (61.3 kg)   LMP 11/16/2012   SpO2 96%   BMI 21.82 kg/m²     Physical Exam  Vitals reviewed. Constitutional:       Appearance: Normal appearance. HENT:      Head: Normocephalic and atraumatic. Eyes:      Conjunctiva/sclera: Conjunctivae normal.      Pupils: Pupils are equal, round, and reactive to light. Cardiovascular:      Rate and Rhythm: Normal rate and regular rhythm. Pulses: Normal pulses. Heart sounds: Normal heart sounds. Pulmonary:      Effort: Pulmonary effort is normal.      Breath sounds: Normal breath sounds. Abdominal:      General: Abdomen is flat. Bowel sounds are normal. There is no distension. Palpations: Abdomen is soft. There is no mass. Tenderness: There is no abdominal tenderness. There is no guarding. Musculoskeletal:      Cervical back: Neck supple. Left lower leg: No edema. Comments: No calf tenderness. Lymphadenopathy:      Cervical: No cervical adenopathy. Skin:     General: Skin is warm and dry. Neurological:      General: No focal deficit present. Mental Status: She is alert and oriented to person, place, and time.    Psychiatric:         Mood and Affect: Mood normal. Behavior: Behavior normal.         Thought Content: Thought content normal.         Judgment: Judgment normal.     Lab Results   Component Value Date/Time    D-dimer 0.23 08/25/2022 11:50 AM     Lab Results   Component Value Date/Time    WBC 3.7 08/25/2022 11:10 AM    HGB 12.9 08/25/2022 11:10 AM    HCT 39.9 08/25/2022 11:10 AM    PLATELET 280 29/07/5338 11:10 AM    MCV 89.7 08/25/2022 11:10 AM     Lab Results   Component Value Date/Time    Sodium 141 08/25/2022 11:10 AM    Potassium 3.8 08/25/2022 11:10 AM    Chloride 105 08/25/2022 11:10 AM    CO2 32 08/25/2022 11:10 AM    Anion gap 4 (L) 08/25/2022 11:10 AM    Glucose 109 (H) 08/25/2022 11:10 AM    BUN 13 08/25/2022 11:10 AM    Creatinine 0.50 (L) 08/25/2022 11:10 AM    BUN/Creatinine ratio 26 (H) 08/25/2022 11:10 AM    GFR est AA >60 08/25/2022 11:10 AM    GFR est non-AA >60 08/25/2022 11:10 AM    Calcium 9.1 08/25/2022 11:10 AM    Bilirubin, total 0.2 08/25/2022 11:10 AM    Alk. phosphatase 82 08/25/2022 11:10 AM    Protein, total 6.9 08/25/2022 11:10 AM    Albumin 3.4 (L) 08/25/2022 11:10 AM    Globulin 3.5 08/25/2022 11:10 AM    A-G Ratio 1.0 (L) 08/25/2022 11:10 AM    ALT (SGPT) 17 08/25/2022 11:10 AM    AST (SGOT) 6 (L) 08/25/2022 11:10 AM     Lab Results   Component Value Date/Time    CK 22 (L) 03/10/2022 10:03 AM    CK - MB <0.5 (L) 02/05/2014 11:03 AM    CK-MB Index CANNOT BE CALCULATED 02/05/2014 11:03 AM    Troponin-I, Qt. <0.04 01/06/2016 08:55 PM     Lab Results   Component Value Date/Time    Cholesterol, total 210 (H) 03/10/2022 10:03 AM    HDL Cholesterol 81 03/10/2022 10:03 AM    LDL, calculated 116.4 (H) 03/10/2022 10:03 AM    VLDL, calculated 12.6 03/10/2022 10:03 AM    Triglyceride 63 03/10/2022 10:03 AM    CHOL/HDL Ratio 2.6 03/10/2022 10:03 AM            Assessment / Plan     Diagnoses and all orders for this visit:    1. Tobacco dependence in remission  -     CT LOW DOSE LUNG CANCER SCREENING; Future    2.  Tobacco use  -     CT LOW DOSE LUNG CANCER SCREENING; Future    3. COPD with asthma ([facility]  -     albuterol-ipratropium (DUO-NEB) 2.5 mg-0.5 mg/3 ml nebu; INHALE THREE MILLILITERS VIA NEBULIZATION BY MOUTH EVERY 6 HOURS AS NEEDED FOR ASTHMA COPD    4. Vitamin D deficiency  -     cholecalciferol (Vitamin D3) (1000 Units /25 mcg) tablet; Take 1 Tablet by mouth daily. 5. Vitamin B12 deficiency  -     cyanocobalamin (VITAMIN B12) 500 mcg tablet; Take 1 Tablet by mouth daily. 6. Hyperlipidemia, unspecified hyperlipidemia type  -     rosuvastatin (CRESTOR) 5 mg tablet; Take 1 Tablet by mouth nightly. 7. Migraine without status migrainosus, not intractable, unspecified migraine type  -     SUMAtriptan (IMITREX) 50 mg tablet; TAKE 1 TABLET BY MOUTH ONCE AS NEEDED FOR MIGRAINE    8. Atypical chest pain : Seek urgent care if condition worsens or new features develop. Advised cardiac eval. Start omeprazole. Recommended low acid diet. Follow up with GI is scheduled. -     REFERRAL TO CARDIOLOGY    9. Essential hypertension    Other orders  -     Advair Diskus 250-50 mcg/dose diskus inhaler; Take 1 Puff by inhalation two (2) times a day. -     albuterol (ProAir HFA) 90 mcg/actuation inhaler; Take 2 Puffs by inhalation every four (4) hours as needed for Wheezing or Shortness of Breath. -     amLODIPine (NORVASC) 5 mg tablet; Take 0.5 Tablets by mouth daily. -     tiZANidine (ZANAFLEX) 4 mg tablet; Take 1 Tablet by mouth three (3) times daily as needed for Muscle Spasm(s). -     Omeprazole delayed release (PRILOSEC D/R) 20 mg tablet; Take 1 Tablet by mouth daily. I have discussed the diagnosis with the patient and the intended plan as seen in the above orders.      Za Whitfield, DO

## 2022-09-07 DIAGNOSIS — G43.909 MIGRAINE WITHOUT STATUS MIGRAINOSUS, NOT INTRACTABLE, UNSPECIFIED MIGRAINE TYPE: ICD-10-CM

## 2022-09-07 NOTE — TELEPHONE ENCOUNTER
PCP: Mateo Vera DO    Last appt: 9/6/2022  No future appointments.     Requested Prescriptions     Pending Prescriptions Disp Refills    butalbital-acetaminophen-caffeine (FIORICET, ESGIC) -40 mg per tablet 20 Tablet 2         Other Comments:

## 2022-09-08 RX ORDER — BUTALBITAL, ACETAMINOPHEN AND CAFFEINE 50; 325; 40 MG/1; MG/1; MG/1
1 TABLET ORAL
Qty: 20 TABLET | Refills: 2 | Status: SHIPPED | OUTPATIENT
Start: 2022-09-08

## 2022-10-05 DIAGNOSIS — M54.50 CHRONIC BILATERAL LOW BACK PAIN WITHOUT SCIATICA: ICD-10-CM

## 2022-10-05 DIAGNOSIS — G89.29 CHRONIC BILATERAL LOW BACK PAIN WITHOUT SCIATICA: ICD-10-CM

## 2022-10-05 RX ORDER — DULOXETIN HYDROCHLORIDE 30 MG/1
CAPSULE, DELAYED RELEASE ORAL
Qty: 30 CAPSULE | Refills: 0 | OUTPATIENT
Start: 2022-10-05

## 2022-10-05 NOTE — TELEPHONE ENCOUNTER
Declined medication refill for duloxetine - called patient and advised her to update pharmacy regarding her new physician.     Tylor Juares MD

## 2022-11-02 RX ORDER — OMEPRAZOLE 20 MG/1
CAPSULE, DELAYED RELEASE ORAL
Qty: 30 CAPSULE | Refills: 2 | Status: SHIPPED | OUTPATIENT
Start: 2022-11-02

## 2022-11-11 ENCOUNTER — HOSPITAL ENCOUNTER (OUTPATIENT)
Dept: CT IMAGING | Age: 62
Discharge: HOME | End: 2022-11-11
Attending: FAMILY MEDICINE
Payer: MEDICAID

## 2022-11-11 DIAGNOSIS — F17.201 TOBACCO DEPENDENCE IN REMISSION: ICD-10-CM

## 2022-11-11 DIAGNOSIS — Z72.0 TOBACCO USE: ICD-10-CM

## 2022-11-11 PROCEDURE — 71271 CT THORAX LUNG CANCER SCR C-: CPT

## 2022-11-17 ENCOUNTER — OFFICE VISIT (OUTPATIENT)
Dept: CARDIOLOGY CLINIC | Age: 62
End: 2022-11-17
Payer: MEDICAID

## 2022-11-17 VITALS
HEART RATE: 58 BPM | WEIGHT: 137.8 LBS | OXYGEN SATURATION: 94 % | SYSTOLIC BLOOD PRESSURE: 136 MMHG | HEIGHT: 66 IN | DIASTOLIC BLOOD PRESSURE: 70 MMHG | BODY MASS INDEX: 22.14 KG/M2

## 2022-11-17 DIAGNOSIS — K27.9 PUD (PEPTIC ULCER DISEASE): ICD-10-CM

## 2022-11-17 DIAGNOSIS — R07.9 CHEST PAIN, UNSPECIFIED TYPE: Primary | ICD-10-CM

## 2022-11-17 DIAGNOSIS — E78.5 HYPERLIPIDEMIA, UNSPECIFIED HYPERLIPIDEMIA TYPE: ICD-10-CM

## 2022-11-17 PROCEDURE — 93000 ELECTROCARDIOGRAM COMPLETE: CPT | Performed by: INTERNAL MEDICINE

## 2022-11-17 PROCEDURE — 99204 OFFICE O/P NEW MOD 45 MIN: CPT | Performed by: INTERNAL MEDICINE

## 2022-11-17 NOTE — PROGRESS NOTES
Patient was seen on 7/19 at [facility] for Right flank pain . Follow up STAT     Per Dr. Nicole Marina Bodily- stress echo and follow up. Results to go to Dr. Dominick Pickard at [facility] ().

## 2022-11-17 NOTE — PROGRESS NOTES
[place] Guzmán is a 58 y.o. female    Chief Complaint   Patient presents with    New Patient    Chest Pain   Patient is to have upper and lower GI  [place] Dr Khushboo Rowantan:    11/17/22 1447   BP: 136/70   BP 1 Location: Left upper arm   BP Patient Position: Sitting   Pulse: (!) 58   Height: 5' 6\" (1.676 m)   Weight: 137 lb 12.8 oz (62.5 kg)   SpO2: 94%         Chest pain patient states some CP question if it is anxiety     SOB with exertion     Dizziness no    Swelling hands and feet     Refills no      1. Have you been to the ER, urgent care clinic since your last visit? Hospitalized since your last visit? ED 8/25- cp    2. Have you seen or consulted any other health care providers outside of the [address] [place] [address] since your last visit? Include any pap smears or colon screening.  No

## 2022-11-17 NOTE — PROGRESS NOTES
Brayden Kerns MD, MS, [facility] [place] [place]            HISTORY OF PRESENT ILLNESS:    Jonna Chamberlain is a 58 y.o. female who is referred from her PCP for chest pain.     06/2015 - echo stress test - no wall motion abnormality 01/2016 - stress test - EF 66%, no evidence of myocardial ischemia    Pt states her son passed April 2021. And has felt chest pain since then. This happens 3-4 times per week when she is at rest. Denies shortness of breath, palpitations. She characterizes the pain as stabbing and rates it 7/10 on the left side of the chest. She does not associate the pain after eating or with any specific type of foods. States she sees GI and they want to do upper and lower GI studies. She says she needs to be cleared by us first.  She sees Aileen Navarrete at [facility].   States she has dizziness/lightheadedness. This occurs when she stands too quickly. Denies syncope. Complains of swelling in hands and feet. Patient was having Chest pain and went to the ER on 08/25/2022. EKG today NSR 68    Discussed CCS + stress echo.       SUMMARY:   Problem List  Date Reviewed: 3/8/2022            Codes Class Noted    COVID-19 ICD-10-CM: U07.1  ICD-9-CM: 079.89  12/9/2021        Diverticulitis ICD-10-CM: K57.92  ICD-9-CM: 562.11  7/8/2021    Overview Signed 2/22/2022  5:59 PM by Wilma Jones MD     With perforation             Diverticulosis of colon ICD-10-CM: K57.30  ICD-9-CM: 562.10  Unknown        Chest pain ICD-10-CM: R07.9  ICD-9-CM: 786.50  1/7/2016        Family history of esophageal cancer ICD-10-CM: Z80.0  ICD-9-CM: V16.0  Unknown        Migraine without aura and without status migrainosus, not intractable ICD-10-CM: T94.524  ICD-9-CM: 346.10  8/19/2015        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) ICD-10-CM: Y93.21  ICD-9-CM: 293.83  10/16/2012        Long-term use of immunosuppressant medication ICD-10-CM: Z79.60  ICD-9-CM: V58.69  9/7/2012        Encounter for long-term (current) use of steroids ICD-10-CM: Migdalia Triplett  ICD-9-CM: V58.65  9/7/2012        Raynaud's syndrome ICD-10-CM: I73.00  ICD-9-CM: 443.0  9/7/2012        Weight loss ICD-10-CM: R63.4  ICD-9-CM: 783.21  9/7/2012        Sjogren's disease ([facility] ICD-10-CM: M35.00  ICD-9-CM: 710.2  11/21/2011        Inflammatory arthritis ICD-10-CM: M19.90  ICD-9-CM: 714.9  Unknown        Vitamin D deficiency ICD-10-CM: E55.9  ICD-9-CM: 268.9  3/18/2011        H. pylori infection ICD-10-CM: A04.8  ICD-9-CM: 041.86  Unknown        Social anxiety disorder ICD-10-CM: F40.10  ICD-9-CM: 300.23  3/2/2011        Depression ICD-10-CM: F32. A  ICD-9-CM: 770  3/2/2011        PUD (peptic ulcer disease) ICD-10-CM: K27.9  ICD-9-CM: 533.90  Unknown        Migraines ICD-10-CM: U12.937  ICD-9-CM: 346.90  Unknown        Hyperlipidemia ICD-10-CM: E78.5  ICD-9-CM: 272.4  Unknown        Smoking ICD-10-CM: F17.200  ICD-9-CM: 305.1  3/2/2011           Current Outpatient Medications on File Prior to Visit   Medication Sig    omeprazole (PRILOSEC) 20 mg capsule TAKE ONE CAPSULE BY MOUTH DAILY    butalbital-acetaminophen-caffeine (FIORICET, ESGIC) -40 mg per tablet Take 1 Tablet by mouth every six (6) hours as needed for Headache. Advair Diskus 250-50 mcg/dose diskus inhaler Take 1 Puff by inhalation two (2) times a day. albuterol (ProAir HFA) 90 mcg/actuation inhaler Take 2 Puffs by inhalation every four (4) hours as needed for Wheezing or Shortness of Breath. albuterol-ipratropium (DUO-NEB) 2.5 mg-0.5 mg/3 ml nebu INHALE THREE MILLILITERS VIA NEBULIZATION BY MOUTH EVERY 6 HOURS AS NEEDED FOR ASTHMA COPD    amLODIPine (NORVASC) 5 mg tablet Take 0.5 Tablets by mouth daily. cholecalciferol (Vitamin D3) (1000 Units /25 mcg) tablet Take 1 Tablet by mouth daily. cyanocobalamin (VITAMIN B12) 500 mcg tablet Take 1 Tablet by mouth daily. rosuvastatin (CRESTOR) 5 mg tablet Take 1 Tablet by mouth nightly.     SUMAtriptan (IMITREX) 50 mg tablet TAKE 1 TABLET BY MOUTH ONCE AS NEEDED FOR MIGRAINE    tiZANidine (ZANAFLEX) 4 mg tablet Take 1 Tablet by mouth three (3) times daily as needed for Muscle Spasm(s). sucralfate (Carafate) 100 mg/mL suspension Take 10 mL by mouth four (4) times daily. amitriptyline (ELAVIL) 25 mg tablet TAKE ONE TABLET BY MOUTH ONCE NIGHTLY    ondansetron (Zofran ODT) 4 mg disintegrating tablet Take 1 Tablet by mouth every six (6) hours as needed for Nausea or Vomiting. [DISCONTINUED] DULoxetine (CYMBALTA) 30 mg capsule Take 1 Capsule by mouth daily. (Patient not taking: Reported on 11/17/2022)    [DISCONTINUED] fluticasone propionate (FLONASE) 50 mcg/actuation nasal spray 2 Sprays by Both Nostrils route daily. (Patient not taking: Reported on 11/17/2022)     No current facility-administered medications on file prior to visit. CARDIOLOGY STUDIES TO DATE:  No results found for this visit on 11/17/22. EKG NSR 68     01/06/16    NM CARDIAC SPECT W STRS/REST MULT 01/07/2016 1/7/2016    Narrative  **Final Report**      ICD Codes / Adm. Diagnosis: 822436  R41.3 / Chest Pain  chest pain  Examination:  NM CARD SPECT MULT WALL EF  - [id number] - Jan 7 2016 10:44AM  Accession No:  [id number]  Reason:  cp      REPORT:  EXAM:  NM CARD SPECT MULT WALL EF    INDICATION: Chest pain. History of smoking    COMPARISON:  None. CORRELATIVE IMAGING STUDIES:  None    TRACER: Tc 99m Sestamibi    TECHNIQUE:  Resting SPECT images of the heart were obtained following the  uneventful intravenous administration of 11.0 mCi of Tc 99m Sestamibi. Gated stress SPECT images of the heart were obtained following Alberto  exercise protocol and the uneventful intravenous administration of 32.2 mCi  of Tc 99m Sestamibi. FINDINGS:  The rest and stress perfusion images demonstrate no significant  perfusion defect or evidence of myocardial reversibility. The gated images demonstrate normal global and regional wall motion and  thickening.  Left ventricular ejection fraction is 66 %. Impression  :  Normal gated rest/stress myocardial perfusion imaging study. No evidence of  myocardial ischemia or infarction. Left ventricular ejection fraction is 66  %.           Signing/Reading Doctor: Nicolette Sneed ([id number])  Approved: Nicolette Sneed ([id number])  Jan 7 2016  1:01PM    Signed by: Dalila Kearns MD on 1/7/2016 12:59 PM         CARDIAC ROS:   positive for chest pain    Past Medical History:   Diagnosis Date    Acid indigestion     Alopecia     Chest pain     COVID-19 12/9/2021    Depression 3/2/2011    Diverticulitis 7/8/2021    Diverticulosis of colon     Dizziness     Emphysema of lung ([address]     on CXR    External hemorrhoid     Family history of esophageal cancer     H. pylori infection     Hyperlipidemia     Hyperlipidemia     Inflammatory arthritis     Joint pain     Joint swelling     Lupus ([address]     Lupus ([address]     Menopause     FWG--    Migraines     Muscle aches     Osteopenia     DEXA 5/9/11    PUD (peptic ulcer disease)     Sicca syndrome ([address]     Sicca syndrome ([address] 9/7/2012    Sjogren's disease ([address] 11/21/2011    Sjogren's disease ([address] 11/21/2011    Sjogrens syndrome ([address]     Stiffness joints     Trouble in sleeping        Family History   Problem Relation Age of Onset    Breast Cancer Mother 48    Cancer Father         throat,stomach       Social History     Socioeconomic History    Marital status: LEGALLY      Spouse name: Annalise Hayward    Number of children: 3    Years of education: 10    Highest education level: Not on file   Occupational History    Occupation: unemployed   Tobacco Use    Smoking status: Former     Packs/day: 0.50     Years: 35.00     Pack years: 17.50     Types: Cigarettes    Smokeless tobacco: Never    Tobacco comments:     15 cigs daily    Vaping Use    Vaping Use: Not on file   Substance and Sexual Activity    Alcohol use: No     Alcohol/week: 0.0 standard drinks    Drug use: No    Sexual activity: Yes     Partners: Male     Comment:  occasional get together   Other Topics Concern     Service No    Blood Transfusions No    Caffeine Concern No    Occupational Exposure Not Asked    Hobby Hazards Not Asked    Sleep Concern Not Asked    Stress Concern Not Asked    Weight Concern Not Asked    Special Diet Not Asked    Back Care Not Asked    Exercise Not Asked    Bike Helmet Not Asked    Seat Belt Yes     Comment: sometimes    Self-Exams Yes   Social History Narrative    Not on file     Social Determinants of Health     Financial Resource Strain: Low Risk     Difficulty of Paying Living Expenses: Not hard at all   Food Insecurity: No Food Insecurity    Worried About Running Out of Food in the Last Year: Never true    Ran Out of Food in the Last Year: Never true   Transportation Needs: Not on file   Physical Activity: Not on file   Stress: Not on file   Social Connections: Not on file   Intimate Partner Violence: Not on file   Housing Stability: Not on file        GENERAL ROS:  A comprehensive review of systems was negative except for that written in the HPI.     Visit Vitals  /70 (BP 1 Location: Left upper arm, BP Patient Position: Sitting)   Pulse (!) 58   Ht 5' 6\" (1.676 m)   Wt 137 lb 12.8 oz (62.5 kg)   LMP 11/16/2012   SpO2 94%   BMI 22.24 kg/m²     Vitals:    11/17/22 1447   BP: 136/70   Pulse: (!) 58   SpO2: 94%   Weight: 137 lb 12.8 oz (62.5 kg)   Height: 5' 6\" (1.676 m)        Wt Readings from Last 3 Encounters:   11/17/22 137 lb 12.8 oz (62.5 kg)   09/06/22 135 lb 3.2 oz (61.3 kg)   03/08/22 130 lb (59 kg)            BP Readings from Last 3 Encounters:   11/17/22 136/70   09/06/22 (!) 161/83   08/25/22 133/66       PHYSICAL EXAM  General appearance: alert, cooperative, no distress, appears stated age  Neurologic: Alert and oriented X 3  Neck: supple, symmetrical, trachea midline, no adenopathy, thyroid: not enlarged, symmetric, no tenderness/mass/nodules, no carotid bruit, and no JVD  Lungs: clear to auscultation bilaterally  Heart: regular rate and rhythm, S1, S2 normal, no murmur, click, rub or gallop  Abdomen: soft, non-tender.  Bowel sounds normal. No masses,  no organomegaly  Extremities: extremities normal, atraumatic, no cyanosis, trace edema in lower extremities  Pulses: 2+ and symmetric  Skin: Skin color, texture, turgor normal. No rashes or lesions    Lab Results   Component Value Date/Time    Cholesterol, total 210 (H) 03/10/2022 10:03 AM    Cholesterol, total 223 (H) 09/09/2020 09:20 AM    Cholesterol, total 213 (H) 03/04/2015 09:54 AM    Cholesterol, total 183 11/21/2011 10:20 AM    Cholesterol, total 231 (H) 04/29/2011 11:49 AM    HDL Cholesterol 81 03/10/2022 10:03 AM    HDL Cholesterol 87 09/09/2020 09:20 AM    HDL Cholesterol 66 03/04/2015 09:54 AM    HDL Cholesterol 66 11/21/2011 10:20 AM    HDL Cholesterol 82 04/29/2011 11:49 AM    LDL, calculated 116.4 (H) 03/10/2022 10:03 AM    LDL, calculated 118.8 (H) 09/09/2020 09:20 AM    LDL, calculated 123 (H) 03/04/2015 09:54 AM    LDL, calculated 102 (H) 11/21/2011 10:20 AM    LDL, calculated 126 (H) 04/29/2011 11:49 AM    Triglyceride 63 03/10/2022 10:03 AM    Triglyceride 86 09/09/2020 09:20 AM    Triglyceride 121 03/04/2015 09:54 AM    Triglyceride 74 11/21/2011 10:20 AM    Triglyceride 113 04/29/2011 11:49 AM    CHOL/HDL Ratio 2.6 03/10/2022 10:03 AM    CHOL/HDL Ratio 2.6 09/09/2020 09:20 AM       Lab Results   Component Value Date/Time    WBC 3.7 08/25/2022 11:10 AM    HGB 12.9 08/25/2022 11:10 AM    HCT 39.9 08/25/2022 11:10 AM    PLATELET 449 88/03/5815 11:10 AM    MCV 89.7 08/25/2022 11:10 AM        Lab Results   Component Value Date/Time    Cholesterol, total 210 (H) 03/10/2022 10:03 AM    Cholesterol, total 223 (H) 09/09/2020 09:20 AM    Cholesterol, total 213 (H) 03/04/2015 09:54 AM    Cholesterol, total 183 11/21/2011 10:20 AM    Cholesterol, total 231 (H) 04/29/2011 11:49 AM    HDL Cholesterol 81 03/10/2022 10:03 AM    HDL Cholesterol 87 09/09/2020 09:20 AM    HDL Cholesterol 66 03/04/2015 09:54 AM    HDL Cholesterol 66 11/21/2011 10:20 AM    HDL Cholesterol 82 04/29/2011 11:49 AM    LDL, calculated 116.4 (H) 03/10/2022 10:03 AM    LDL, calculated 118.8 (H) 09/09/2020 09:20 AM    LDL, calculated 123 (H) 03/04/2015 09:54 AM    LDL, calculated 102 (H) 11/21/2011 10:20 AM    LDL, calculated 126 (H) 04/29/2011 11:49 AM    Triglyceride 63 03/10/2022 10:03 AM    Triglyceride 86 09/09/2020 09:20 AM    Triglyceride 121 03/04/2015 09:54 AM    Triglyceride 74 11/21/2011 10:20 AM    Triglyceride 113 04/29/2011 11:49 AM    CHOL/HDL Ratio 2.6 03/10/2022 10:03 AM    CHOL/HDL Ratio 2.6 09/09/2020 09:20 AM        ASSESSMENT  Diagnoses and all orders for this visit:    1. Chest pain, unspecified type  -     AMB POC EKG ROUTINE W/ 12 LEADS, INTER & REP    2. Hyperlipidemia, unspecified hyperlipidemia type    3. PUD (peptic ulcer disease)     Patient EKG NSR 68 without ischemic changes. Will order stress echo for chest pain. Followed with PCP for hyperlipidemia. Continue with rosouvastatin 5 mg. Will follow-up with GI for upper and lower studies following stress echo evaluation. Encounter Diagnoses   Name Primary? Chest pain, unspecified type Yes    Hyperlipidemia, unspecified hyperlipidemia type     PUD (peptic ulcer disease)      Orders Placed This Encounter    AMB POC EKG ROUTINE W/ 12 LEADS, INTER & REP       Plan          MARIA ELENA Jacob  11/17/2022        [facility]  [address] [place] [address] [address]  [address]  [phone] (F)    [address]  [facility]  [phone] (P)  [phone] (F)    ATTENTION:   This medical record was transcribed using an electronic medical records/speech recognition system. Although proofread, it may and can contain electronic, spelling and other errors. Corrections may be executed at a later time. Please feel free to contact us for any clarifications as needed.

## 2023-01-03 ENCOUNTER — APPOINTMENT (OUTPATIENT)
Dept: CARDIOLOGY CLINIC | Age: 63
End: 2023-01-03

## 2023-01-03 ENCOUNTER — DOCUMENTATION ONLY (OUTPATIENT)
Dept: CARDIOLOGY CLINIC | Age: 63
End: 2023-01-03

## 2023-01-03 ENCOUNTER — ANCILLARY PROCEDURE (OUTPATIENT)
Dept: CARDIOLOGY CLINIC | Age: 63
End: 2023-01-03

## 2023-01-03 VITALS — BODY MASS INDEX: 22.02 KG/M2 | WEIGHT: 137 LBS | HEIGHT: 66 IN

## 2023-01-03 DIAGNOSIS — R07.9 CHEST PAIN, UNSPECIFIED TYPE: ICD-10-CM

## 2023-01-06 ENCOUNTER — DOCUMENTATION ONLY (OUTPATIENT)
Dept: CARDIOLOGY CLINIC | Age: 63
End: 2023-01-06

## 2023-01-06 LAB
STRESS ANGINA INDEX: 0
STRESS BASELINE DIAS BP: 76 MMHG
STRESS BASELINE HR: 86 BPM
STRESS BASELINE SYS BP: 132 MMHG
STRESS ESTIMATED WORKLOAD: 7 METS
STRESS EXERCISE DUR MIN: 4 MIN
STRESS EXERCISE DUR SEC: 0 SEC
STRESS O2 SAT PEAK: 93 %
STRESS O2 SAT REST: 95 %
STRESS PEAK DIAS BP: 80 MMHG
STRESS PEAK SYS BP: 160 MMHG
STRESS PERCENT HR ACHIEVED: 96 %
STRESS POST PEAK HR: 151 BPM
STRESS RATE PRESSURE PRODUCT: NORMAL BPM*MMHG
STRESS SR DUKE TREADMILL SCORE: 4
STRESS ST DEPRESSION: 0 MM
STRESS TARGET HR: 158 BPM

## 2023-01-06 NOTE — PROGRESS NOTES
Good news, your stress echocardiogram was normal.      Please call with office should you have any further questions.     Dr. Pressley Clamp

## 2023-01-06 NOTE — PROGRESS NOTES
Faxed stress echo report to Valerio Tilley NP at verified fax number [phone] with confirmation.

## 2023-01-26 NOTE — PROGRESS NOTES
Sherice Morel MD, MS, [address] [facility]            HISTORY OF PRESENT ILLNESS:    Ziyad Pizano is a 58 y.o. female who is referred from her PCP for chest pain.     06/2015 - echo stress test - no wall motion abnormality 01/2016 - stress test - EF 66%, no evidence of myocardial ischemia    Pt states her son passed April 2021. And has felt chest pain since then. This happens 3-4 times per week when she is at rest. Denies shortness of breath, palpitations. She characterizes the pain as stabbing and rates it 7/10 on the left side of the chest. She does not associate the pain after eating or with any specific type of foods. States she sees GI and they want to do upper and lower GI studies. She says she needs to be cleared by us first.  She sees Aileen Sheridan at [facility].   States she has dizziness/lightheadedness. This occurs when she stands too quickly. Denies syncope. Complains of swelling in hands and feet. Patient was having Chest pain and went to the ER on 08/25/2022. EKG today NSR 68    Discussed CCS + stress echo. Quit smoking.   Discussed pulmonary referral   SUMMARY:   Problem List  Date Reviewed: 3/8/2022            Codes Class Noted    COVID-19 ICD-10-CM: U07.1  ICD-9-CM: 079.89  12/9/2021        Diverticulitis ICD-10-CM: K57.92  ICD-9-CM: 562.11  7/8/2021    Overview Signed 2/22/2022  5:59 PM by René Lau MD     With perforation             Diverticulosis of colon ICD-10-CM: K57.30  ICD-9-CM: 562.10  Unknown        Chest pain ICD-10-CM: R07.9  ICD-9-CM: 786.50  1/7/2016        Family history of esophageal cancer ICD-10-CM: Z80.0  ICD-9-CM: V16.0  Unknown        Migraine without aura and without status migrainosus, not intractable ICD-10-CM: Q14.104  ICD-9-CM: 346.10  8/19/2015        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) ICD-10-CM: R91.70  ICD-9-CM: 293.83  10/16/2012        Long-term use of immunosuppressant medication ICD-10-CM: Z79.60  ICD-9-CM: V58.69 9/7/2012        Encounter for long-term (current) use of steroids ICD-10-CM: Waneta Asai  ICD-9-CM: V58.65  9/7/2012        Raynaud's syndrome ICD-10-CM: I73.00  ICD-9-CM: 443.0  9/7/2012        Weight loss ICD-10-CM: R63.4  ICD-9-CM: 783.21  9/7/2012        Sjogren's disease ([facility] ICD-10-CM: M35.00  ICD-9-CM: 710.2  11/21/2011        Inflammatory arthritis ICD-10-CM: M19.90  ICD-9-CM: 714.9  Unknown        Vitamin D deficiency ICD-10-CM: E55.9  ICD-9-CM: 268.9  3/18/2011        H. pylori infection ICD-10-CM: A04.8  ICD-9-CM: 041.86  Unknown        Social anxiety disorder ICD-10-CM: F40.10  ICD-9-CM: 300.23  3/2/2011        Depression ICD-10-CM: F32. A  ICD-9-CM: 069  3/2/2011        PUD (peptic ulcer disease) ICD-10-CM: K27.9  ICD-9-CM: 533.90  Unknown        Migraines ICD-10-CM: O00.871  ICD-9-CM: 346.90  Unknown        Hyperlipidemia ICD-10-CM: E78.5  ICD-9-CM: 272.4  Unknown        Smoking ICD-10-CM: F17.200  ICD-9-CM: 305.1  3/2/2011           Current Outpatient Medications on File Prior to Visit   Medication Sig    omeprazole (PRILOSEC) 20 mg capsule TAKE ONE CAPSULE BY MOUTH DAILY    butalbital-acetaminophen-caffeine (FIORICET, ESGIC) -40 mg per tablet Take 1 Tablet by mouth every six (6) hours as needed for Headache. Advair Diskus 250-50 mcg/dose diskus inhaler Take 1 Puff by inhalation two (2) times a day. albuterol (ProAir HFA) 90 mcg/actuation inhaler Take 2 Puffs by inhalation every four (4) hours as needed for Wheezing or Shortness of Breath. albuterol-ipratropium (DUO-NEB) 2.5 mg-0.5 mg/3 ml nebu INHALE THREE MILLILITERS VIA NEBULIZATION BY MOUTH EVERY 6 HOURS AS NEEDED FOR ASTHMA COPD    amLODIPine (NORVASC) 5 mg tablet Take 0.5 Tablets by mouth daily. cholecalciferol (Vitamin D3) (1000 Units /25 mcg) tablet Take 1 Tablet by mouth daily. cyanocobalamin (VITAMIN B12) 500 mcg tablet Take 1 Tablet by mouth daily. rosuvastatin (CRESTOR) 5 mg tablet Take 1 Tablet by mouth nightly. SUMAtriptan (IMITREX) 50 mg tablet TAKE 1 TABLET BY MOUTH ONCE AS NEEDED FOR MIGRAINE    tiZANidine (ZANAFLEX) 4 mg tablet Take 1 Tablet by mouth three (3) times daily as needed for Muscle Spasm(s). sucralfate (Carafate) 100 mg/mL suspension Take 10 mL by mouth four (4) times daily. amitriptyline (ELAVIL) 25 mg tablet TAKE ONE TABLET BY MOUTH ONCE NIGHTLY    ondansetron (Zofran ODT) 4 mg disintegrating tablet Take 1 Tablet by mouth every six (6) hours as needed for Nausea or Vomiting. No current facility-administered medications on file prior to visit. CARDIOLOGY STUDIES TO DATE:  No results found for this visit on 01/27/23. EKG NSR 68     01/06/16    NM CARDIAC SPECT W STRS/REST MULT 01/07/2016 1/7/2016    Narrative  **Final Report**      ICD Codes / Adm. Diagnosis: 649253  R41.3 / Chest Pain  chest pain  Examination:  NM CARD SPECT MULT WALL EF  - [id number] - Jan 7 2016 10:44AM  Accession No:  [id number]  Reason:  cp      REPORT:  EXAM:  NM CARD SPECT MULT WALL EF    INDICATION: Chest pain. History of smoking    COMPARISON:  None. CORRELATIVE IMAGING STUDIES:  None    TRACER: Tc 99m Sestamibi    TECHNIQUE:  Resting SPECT images of the heart were obtained following the  uneventful intravenous administration of 11.0 mCi of Tc 99m Sestamibi. Gated stress SPECT images of the heart were obtained following Alberto  exercise protocol and the uneventful intravenous administration of 32.2 mCi  of Tc 99m Sestamibi. FINDINGS:  The rest and stress perfusion images demonstrate no significant  perfusion defect or evidence of myocardial reversibility. The gated images demonstrate normal global and regional wall motion and  thickening. Left ventricular ejection fraction is 66 %. Impression  :  Normal gated rest/stress myocardial perfusion imaging study. No evidence of  myocardial ischemia or infarction. Left ventricular ejection fraction is 66  %.           Signing/Reading Doctor: Lyndsey Talbert ([id number])  Approved: Layton Bumpers ([id number])  Jan 7 2016  1:01PM    Signed by: Tammy Fine MD on 1/7/2016 12:59 PM         CARDIAC ROS:   positive for chest pain    Past Medical History:   Diagnosis Date    Acid indigestion     Alopecia     Chest pain     COVID-19 12/9/2021    Depression 3/2/2011    Diverticulitis 7/8/2021    Diverticulosis of colon     Dizziness     Emphysema of lung ([address]     on CXR    External hemorrhoid     Family history of esophageal cancer     H. pylori infection     Hyperlipidemia     Hyperlipidemia     Inflammatory arthritis     Joint pain     Joint swelling     Lupus ([address]     Lupus ([address]     Menopause     QDB--    Migraines     Muscle aches     Osteopenia     DEXA 5/9/11    PUD (peptic ulcer disease)     Sicca syndrome ([address]     Sicca syndrome ([address] 9/7/2012    Sjogren's disease ([address] 11/21/2011    Sjogren's disease ([address] 11/21/2011    Sjogrens syndrome ([address]     Stiffness joints     Trouble in sleeping        Family History   Problem Relation Age of Onset    Breast Cancer Mother 48    Cancer Father         throat,stomach       Social History     Socioeconomic History    Marital status: LEGALLY      Spouse name: Osman Mathias    Number of children: 3    Years of education: 10    Highest education level: Not on file   Occupational History    Occupation: unemployed   Tobacco Use    Smoking status: Former     Packs/day: 0.50     Years: 35.00     Pack years: 17.50     Types: Cigarettes    Smokeless tobacco: Never    Tobacco comments:     15 cigs daily    Vaping Use    Vaping Use: Not on file   Substance and Sexual Activity    Alcohol use: No     Alcohol/week: 0.0 standard drinks    Drug use: No    Sexual activity: Yes     Partners: Male     Comment:  occasional get together   Other Topics Concern     Service No    Blood Transfusions No    Caffeine Concern No    Occupational Exposure Not Asked    Hobby Hazards Not Asked    Sleep Concern Not Asked    Stress Concern Not Asked    Weight Concern Not Asked    Special Diet Not Asked    Back Care Not Asked    Exercise Not Asked    Bike Helmet Not Asked    Seat Belt Yes     Comment: sometimes    Self-Exams Yes   Social History Narrative    Not on file     Social Determinants of Health     Financial Resource Strain: Low Risk     Difficulty of Paying Living Expenses: Not hard at all   Food Insecurity: No Food Insecurity    Worried About Running Out of Food in the Last Year: Never true    Ran Out of Food in the Last Year: Never true   Transportation Needs: Not on file   Physical Activity: Not on file   Stress: Not on file   Social Connections: Not on file   Intimate Partner Violence: Not on file   Housing Stability: Not on file        GENERAL ROS:  A comprehensive review of systems was negative except for that written in the HPI. Visit Vitals  LMP 11/16/2012     There were no vitals filed for this visit. Wt Readings from Last 3 Encounters:   01/03/23 62.1 kg (137 lb)   11/17/22 62.5 kg (137 lb 12.8 oz)   09/06/22 61.3 kg (135 lb 3.2 oz)            BP Readings from Last 3 Encounters:   11/17/22 136/70   09/06/22 (!) 161/83   08/25/22 133/66       PHYSICAL EXAM  General appearance: alert, cooperative, no distress, appears stated age  Neurologic: Alert and oriented X 3  Neck: supple, symmetrical, trachea midline, no adenopathy, thyroid: not enlarged, symmetric, no tenderness/mass/nodules, no carotid bruit, and no JVD  Lungs: clear to auscultation bilaterally  Heart: regular rate and rhythm, S1, S2 normal, no murmur, click, rub or gallop  Abdomen: soft, non-tender.  Bowel sounds normal. No masses,  no organomegaly  Extremities: extremities normal, atraumatic, no cyanosis, trace edema in lower extremities  Pulses: 2+ and symmetric  Skin: Skin color, texture, turgor normal. No rashes or lesions    Lab Results   Component Value Date/Time    Cholesterol, total 210 (H) 03/10/2022 10:03 AM    Cholesterol, total 223 (H) 09/09/2020 09:20 AM    Cholesterol, total 213 (H) 03/04/2015 09:54 AM    Cholesterol, total 183 11/21/2011 10:20 AM    Cholesterol, total 231 (H) 04/29/2011 11:49 AM    HDL Cholesterol 81 03/10/2022 10:03 AM    HDL Cholesterol 87 09/09/2020 09:20 AM    HDL Cholesterol 66 03/04/2015 09:54 AM    HDL Cholesterol 66 11/21/2011 10:20 AM    HDL Cholesterol 82 04/29/2011 11:49 AM    LDL, calculated 116.4 (H) 03/10/2022 10:03 AM    LDL, calculated 118.8 (H) 09/09/2020 09:20 AM    LDL, calculated 123 (H) 03/04/2015 09:54 AM    LDL, calculated 102 (H) 11/21/2011 10:20 AM    LDL, calculated 126 (H) 04/29/2011 11:49 AM    Triglyceride 63 03/10/2022 10:03 AM    Triglyceride 86 09/09/2020 09:20 AM    Triglyceride 121 03/04/2015 09:54 AM    Triglyceride 74 11/21/2011 10:20 AM    Triglyceride 113 04/29/2011 11:49 AM    CHOL/HDL Ratio 2.6 03/10/2022 10:03 AM    CHOL/HDL Ratio 2.6 09/09/2020 09:20 AM       Lab Results   Component Value Date/Time    WBC 3.7 08/25/2022 11:10 AM    HGB 12.9 08/25/2022 11:10 AM    HCT 39.9 08/25/2022 11:10 AM    PLATELET 328 03/53/8224 11:10 AM    MCV 89.7 08/25/2022 11:10 AM        Lab Results   Component Value Date/Time    Cholesterol, total 210 (H) 03/10/2022 10:03 AM    Cholesterol, total 223 (H) 09/09/2020 09:20 AM    Cholesterol, total 213 (H) 03/04/2015 09:54 AM    Cholesterol, total 183 11/21/2011 10:20 AM    Cholesterol, total 231 (H) 04/29/2011 11:49 AM    HDL Cholesterol 81 03/10/2022 10:03 AM    HDL Cholesterol 87 09/09/2020 09:20 AM    HDL Cholesterol 66 03/04/2015 09:54 AM    HDL Cholesterol 66 11/21/2011 10:20 AM    HDL Cholesterol 82 04/29/2011 11:49 AM    LDL, calculated 116.4 (H) 03/10/2022 10:03 AM    LDL, calculated 118.8 (H) 09/09/2020 09:20 AM    LDL, calculated 123 (H) 03/04/2015 09:54 AM    LDL, calculated 102 (H) 11/21/2011 10:20 AM    LDL, calculated 126 (H) 04/29/2011 11:49 AM    Triglyceride 63 03/10/2022 10:03 AM    Triglyceride 86 09/09/2020 09:20 AM    Triglyceride 121 03/04/2015 09:54 AM    Triglyceride 74 11/21/2011 10:20 AM    Triglyceride 113 04/29/2011 11:49 AM    CHOL/HDL Ratio 2.6 03/10/2022 10:03 AM    CHOL/HDL Ratio 2.6 09/09/2020 09:20 AM        ASSESSMENT  {There are no diagnoses linked to this encounter. (Refresh or delete this SmartLink)}     Patient EKG NSR 68 without ischemic changes. Will order stress echo for chest pain. Followed with PCP for hyperlipidemia. Continue with rosouvastatin 5 mg. Will follow-up with GI for upper and lower studies following stress echo evaluation. No diagnosis found. No orders of the defined types were placed in this encounter. 1000 Aniceto Mederos MD  1/26/2023        [address]   [address] [facility], [address] [place] [address] Avenue  [phone] (F)    380 [facility]  [address]  [phone] (P)  [phone] (F)    ATTENTION:   This medical record was transcribed using an electronic medical records/speech recognition system. Although proofread, it may and can contain electronic, spelling and other errors. Corrections may be executed at a later time. Please feel free to contact us for any clarifications as needed.

## 2023-01-27 ENCOUNTER — OFFICE VISIT (OUTPATIENT)
Dept: CARDIOLOGY CLINIC | Age: 63
End: 2023-01-27
Payer: COMMERCIAL

## 2023-01-27 VITALS
HEART RATE: 94 BPM | BODY MASS INDEX: 22.82 KG/M2 | RESPIRATION RATE: 18 BRPM | WEIGHT: 142 LBS | SYSTOLIC BLOOD PRESSURE: 110 MMHG | DIASTOLIC BLOOD PRESSURE: 72 MMHG | HEIGHT: 66 IN | OXYGEN SATURATION: 96 %

## 2023-01-27 DIAGNOSIS — R06.02 SOB (SHORTNESS OF BREATH): Primary | ICD-10-CM

## 2023-01-27 PROCEDURE — 99214 OFFICE O/P EST MOD 30 MIN: CPT | Performed by: INTERNAL MEDICINE

## 2023-01-27 RX ORDER — DICYCLOMINE HYDROCHLORIDE 10 MG/1
CAPSULE ORAL
COMMUNITY
Start: 2023-01-21

## 2023-01-27 NOTE — PROGRESS NOTES
Per Dr. Ivette Hubbard- referral to pulmonary for SOB, COPD and request for PFTs. No formal follow up with Dr. Ivette Hubbard. Dr. Ivette Hubbard encouraged patient todo CT CCS when able-cost has been an issue so patient will schedule when able.

## 2023-01-27 NOTE — Clinical Note
1/27/2023    Patient: Shellee Favre   YOB: 1960   Date of Visit: 1/27/2023     Za Whitfield, [address] [address]  Via In Basket    Dear Za Whitfield DO,      Thank you for referring Ms. Maureen Rai to [address] [place] [address] for evaluation. My notes for this consultation are attached. If you have questions, please do not hesitate to call me. I look forward to following your patient along with you.       Sincerely,    Maureen Mehta MD

## 2023-03-14 RX ORDER — ALBUTEROL SULFATE 90 UG/1
AEROSOL, METERED RESPIRATORY (INHALATION)
Qty: 8.5 EACH | Refills: 1 | Status: SHIPPED | OUTPATIENT
Start: 2023-03-14

## 2023-04-25 DIAGNOSIS — G43.909 MIGRAINE WITHOUT STATUS MIGRAINOSUS, NOT INTRACTABLE, UNSPECIFIED MIGRAINE TYPE: ICD-10-CM

## 2023-04-25 RX ORDER — SUMATRIPTAN 50 MG/1
TABLET, FILM COATED ORAL
Qty: 18 TABLET | Refills: 0 | Status: SHIPPED | OUTPATIENT
Start: 2023-04-25

## 2023-04-27 ENCOUNTER — TELEPHONE (OUTPATIENT)
Dept: PRIMARY CARE CLINIC | Age: 63
End: 2023-04-27

## 2023-04-27 RX ORDER — ALBUTEROL SULFATE 0.83 MG/ML
2.5 SOLUTION RESPIRATORY (INHALATION)
Qty: 90 EACH | Refills: 2 | Status: SHIPPED | OUTPATIENT
Start: 2023-04-27

## 2023-05-30 RX ORDER — OMEPRAZOLE 20 MG/1
CAPSULE, DELAYED RELEASE ORAL
Qty: 30 CAPSULE | Refills: 1 | Status: SHIPPED | OUTPATIENT
Start: 2023-05-30

## 2023-06-24 DIAGNOSIS — E78.5 HYPERLIPIDEMIA, UNSPECIFIED: ICD-10-CM

## 2023-06-27 RX ORDER — CALCIUM CARB/VIT D3/MINERALS 600 MG-200
TABLET,CHEWABLE ORAL
Qty: 90 TABLET | Refills: 0 | Status: SHIPPED | OUTPATIENT
Start: 2023-06-27

## 2023-06-27 RX ORDER — ROSUVASTATIN CALCIUM 5 MG/1
TABLET, COATED ORAL
Qty: 90 TABLET | Refills: 0 | Status: SHIPPED | OUTPATIENT
Start: 2023-06-27

## 2023-06-27 RX ORDER — ALBUTEROL SULFATE 90 UG/1
AEROSOL, METERED RESPIRATORY (INHALATION)
Qty: 8.5 EACH | Refills: 1 | Status: SHIPPED | OUTPATIENT
Start: 2023-06-27

## 2023-07-10 ENCOUNTER — HOSPITAL ENCOUNTER (OUTPATIENT)
Facility: HOSPITAL | Age: 63
Setting detail: SPECIMEN
Discharge: HOME | End: 2023-07-13
Payer: COMMERCIAL

## 2023-07-10 ENCOUNTER — OFFICE VISIT (OUTPATIENT)
Dept: PRIMARY CARE CLINIC | Facility: CLINIC | Age: 63
End: 2023-07-10
Payer: COMMERCIAL

## 2023-07-10 VITALS
OXYGEN SATURATION: 93 % | HEIGHT: 66 IN | SYSTOLIC BLOOD PRESSURE: 105 MMHG | WEIGHT: 156.8 LBS | HEART RATE: 82 BPM | RESPIRATION RATE: 16 BRPM | DIASTOLIC BLOOD PRESSURE: 65 MMHG | TEMPERATURE: 97.5 F | BODY MASS INDEX: 25.2 KG/M2

## 2023-07-10 DIAGNOSIS — E78.2 MIXED HYPERLIPIDEMIA: ICD-10-CM

## 2023-07-10 DIAGNOSIS — Z00.00 ROUTINE GENERAL MEDICAL EXAMINATION AT A HEALTH CARE FACILITY: ICD-10-CM

## 2023-07-10 DIAGNOSIS — Z12.4 SCREENING FOR CERVICAL CANCER: ICD-10-CM

## 2023-07-10 DIAGNOSIS — Z11.3 SCREEN FOR SEXUALLY TRANSMITTED DISEASES: ICD-10-CM

## 2023-07-10 DIAGNOSIS — I10 ESSENTIAL (PRIMARY) HYPERTENSION: ICD-10-CM

## 2023-07-10 DIAGNOSIS — F32.9 REACTIVE DEPRESSION: ICD-10-CM

## 2023-07-10 DIAGNOSIS — B35.1 TINEA UNGUIUM: ICD-10-CM

## 2023-07-10 DIAGNOSIS — R10.12 LUQ PAIN: ICD-10-CM

## 2023-07-10 DIAGNOSIS — L98.9 SKIN LESION: Primary | ICD-10-CM

## 2023-07-10 DIAGNOSIS — Z12.31 SCREENING MAMMOGRAM FOR BREAST CANCER: ICD-10-CM

## 2023-07-10 PROCEDURE — 99396 PREV VISIT EST AGE 40-64: CPT | Performed by: FAMILY MEDICINE

## 2023-07-10 PROCEDURE — 3074F SYST BP LT 130 MM HG: CPT | Performed by: FAMILY MEDICINE

## 2023-07-10 PROCEDURE — 87624 HPV HI-RISK TYP POOLED RSLT: CPT

## 2023-07-10 PROCEDURE — 88175 CYTOPATH C/V AUTO FLUID REDO: CPT

## 2023-07-10 PROCEDURE — 3078F DIAST BP <80 MM HG: CPT | Performed by: FAMILY MEDICINE

## 2023-07-10 RX ORDER — TERBINAFINE HYDROCHLORIDE 250 MG/1
250 TABLET ORAL DAILY
Qty: 42 TABLET | Refills: 0 | Status: SHIPPED | OUTPATIENT
Start: 2023-07-10 | End: 2023-08-21

## 2023-07-10 SDOH — ECONOMIC STABILITY: FOOD INSECURITY: WITHIN THE PAST 12 MONTHS, YOU WORRIED THAT YOUR FOOD WOULD RUN OUT BEFORE YOU GOT MONEY TO BUY MORE.: NEVER TRUE

## 2023-07-10 SDOH — ECONOMIC STABILITY: HOUSING INSECURITY
IN THE LAST 12 MONTHS, WAS THERE A TIME WHEN YOU DID NOT HAVE A STEADY PLACE TO SLEEP OR SLEPT IN A SHELTER (INCLUDING NOW)?: NO

## 2023-07-10 SDOH — ECONOMIC STABILITY: FOOD INSECURITY: WITHIN THE PAST 12 MONTHS, THE FOOD YOU BOUGHT JUST DIDN'T LAST AND YOU DIDN'T HAVE MONEY TO GET MORE.: NEVER TRUE

## 2023-07-10 SDOH — ECONOMIC STABILITY: INCOME INSECURITY: HOW HARD IS IT FOR YOU TO PAY FOR THE VERY BASICS LIKE FOOD, HOUSING, MEDICAL CARE, AND HEATING?: NOT HARD AT ALL

## 2023-07-10 ASSESSMENT — PATIENT HEALTH QUESTIONNAIRE - PHQ9
SUM OF ALL RESPONSES TO PHQ QUESTIONS 1-9: 6
SUM OF ALL RESPONSES TO PHQ QUESTIONS 1-9: 6
2. FEELING DOWN, DEPRESSED OR HOPELESS: 3
SUM OF ALL RESPONSES TO PHQ QUESTIONS 1-9: 6
SUM OF ALL RESPONSES TO PHQ9 QUESTIONS 1 & 2: 6
1. LITTLE INTEREST OR PLEASURE IN DOING THINGS: 3
SUM OF ALL RESPONSES TO PHQ QUESTIONS 1-9: 6

## 2023-07-10 NOTE — PROGRESS NOTES
7/10/2023    Dylon Grayson (:  1960) is a 58 y.o. female, here for a preventive medicine evaluation. Recently eval by pulm. Dx with copd. Intermittent LUQ pain. Occurs when she is resting in the evening. Pain lasts about 30 min. Resolves spontaneously. This has been occurring for the past 2 mos. No association with exertion. BM unchanged. No signs of bleeding. She walks about 30 min per day. No exertional chest pain or dyspnea. Recent cardiac workup that was negative. C/o chronic fungal infection in all toe nails. She does want treatment, prefers PO treatment. She has been down, sad mood, since the death of her son 3 years ago. She has good social support. No thoughts of self harm. Skin lesion left medial eye skin, chronic and enlarging. Admits to unhealthy diet. Patient Active Problem List   Diagnosis    Hyperlipidemia    Inflammatory arthritis    Vitamin D deficiency    Social anxiety disorder    Diverticulitis    H. pylori infection    Chest pain    Migraines    Long-term use of immunosuppressant medication    Weight loss    COVID-19    Raynaud's syndrome    Smoking    Depression    Diverticulosis of colon    Family history of esophageal cancer    Sjogren's disease ([facility])    Mood disorder due to a general medical condition    Migraine without aura and without status migrainosus, not intractable    PUD (peptic ulcer disease)       Review of Systems  Denies weight loss or unexplained fevers. Denies change in BM or signs of bleeding. Denies  symptoms. Prior to Visit Medications    Medication Sig Taking?  Authorizing Provider   [employer] B-12 500 MCG tablet TAKE 1 TABLET BY MOUTH EVERY DAY Yes Katherine Syed DO   albuterol sulfate HFA (PROVENTIL;VENTOLIN;PROAIR) 108 (90 Base) MCG/ACT inhaler INHALE 2 PUFFS BY MOUTH EVERY 4 HOURS AS NEEDED FOR WHEEZE Yes Katherine Syed DO   rosuvastatin (CRESTOR) 5 MG tablet TAKE 1 TABLET BY MOUTH NIGHTLY Yes Princess Velez

## 2023-07-13 LAB
ALBUMIN SERPL-MCNC: 3.7 G/DL (ref 3.5–5)
ALBUMIN/GLOB SERPL: 1.2 (ref 1.1–2.2)
ALP SERPL-CCNC: 89 U/L (ref 45–117)
ALT SERPL-CCNC: 18 U/L (ref 12–78)
ANION GAP SERPL CALC-SCNC: 5 MMOL/L (ref 5–15)
AST SERPL-CCNC: 10 U/L (ref 15–37)
BILIRUB SERPL-MCNC: 0.2 MG/DL (ref 0.2–1)
BUN SERPL-MCNC: 11 MG/DL (ref 6–20)
BUN/CREAT SERPL: 24 (ref 12–20)
CALCIUM SERPL-MCNC: 9.3 MG/DL (ref 8.5–10.1)
CHLORIDE SERPL-SCNC: 106 MMOL/L (ref 97–108)
CHOLEST SERPL-MCNC: 246 MG/DL
CO2 SERPL-SCNC: 31 MMOL/L (ref 21–32)
CREAT SERPL-MCNC: 0.45 MG/DL (ref 0.55–1.02)
ERYTHROCYTE [DISTWIDTH] IN BLOOD BY AUTOMATED COUNT: 12.9 % (ref 11.5–14.5)
GLOBULIN SER CALC-MCNC: 3 G/DL (ref 2–4)
GLUCOSE SERPL-MCNC: 90 MG/DL (ref 65–100)
HCT VFR BLD AUTO: 41.9 % (ref 35–47)
HDLC SERPL-MCNC: 85 MG/DL
HDLC SERPL: 2.9 (ref 0–5)
HGB BLD-MCNC: 12.6 G/DL (ref 11.5–16)
HIV 1+2 AB+HIV1 P24 AG SERPL QL IA: NONREACTIVE
HIV 1/2 RESULT COMMENT: NORMAL
LDLC SERPL CALC-MCNC: 142.2 MG/DL (ref 0–100)
MCH RBC QN AUTO: 26.1 PG (ref 26–34)
MCHC RBC AUTO-ENTMCNC: 30.1 G/DL (ref 30–36.5)
MCV RBC AUTO: 86.7 FL (ref 80–99)
NRBC # BLD: 0 K/UL (ref 0–0.01)
NRBC BLD-RTO: 0 PER 100 WBC
PLATELET # BLD AUTO: 230 K/UL (ref 150–400)
PMV BLD AUTO: 10.6 FL (ref 8.9–12.9)
POTASSIUM SERPL-SCNC: 4.3 MMOL/L (ref 3.5–5.1)
PROT SERPL-MCNC: 6.7 G/DL (ref 6.4–8.2)
RBC # BLD AUTO: 4.83 M/UL (ref 3.8–5.2)
SODIUM SERPL-SCNC: 142 MMOL/L (ref 136–145)
TRIGL SERPL-MCNC: 94 MG/DL
VLDLC SERPL CALC-MCNC: 18.8 MG/DL
WBC # BLD AUTO: 3.3 K/UL (ref 3.6–11)

## 2023-07-14 LAB — T PALLIDUM AB SER QL IA: NON REACTIVE

## 2023-07-15 LAB
A VAGINAE DNA VAG QL NAA+PROBE: NORMAL SCORE
BVAB2 DNA VAG QL NAA+PROBE: NORMAL SCORE
C ALBICANS DNA VAG QL NAA+PROBE: NEGATIVE
C GLABRATA DNA VAG QL NAA+PROBE: NEGATIVE
C TRACH RRNA SPEC QL NAA+PROBE: NEGATIVE
CANDIDA KRUSEI: NEGATIVE
CANDIDA LUSITANIAE, NAA, 180015: NEGATIVE
CANDIDA PARAPSILOSIS/TROPICALIS: NEGATIVE
MEGA1 DNA VAG QL NAA+PROBE: NORMAL SCORE
N GONORRHOEA RRNA SPEC QL NAA+PROBE: NEGATIVE
T VAGINALIS RRNA SPEC QL NAA+PROBE: NEGATIVE

## 2023-07-18 DIAGNOSIS — D72.810 LYMPHOPENIA: Primary | ICD-10-CM

## 2023-08-06 DIAGNOSIS — G43.909 MIGRAINE, UNSPECIFIED, NOT INTRACTABLE, WITHOUT STATUS MIGRAINOSUS: ICD-10-CM

## 2023-08-08 RX ORDER — BUTALBITAL, ACETAMINOPHEN AND CAFFEINE 50; 325; 40 MG/1; MG/1; MG/1
TABLET ORAL
Qty: 20 TABLET | Refills: 0 | Status: SHIPPED | OUTPATIENT
Start: 2023-08-08

## 2023-08-11 DIAGNOSIS — R10.12 LUQ PAIN: Primary | ICD-10-CM

## 2023-08-13 RX ORDER — OMEPRAZOLE 20 MG/1
CAPSULE, DELAYED RELEASE ORAL
Qty: 30 CAPSULE | Refills: 1 | Status: SHIPPED | OUTPATIENT
Start: 2023-08-13

## 2023-08-17 ENCOUNTER — HOSPITAL ENCOUNTER (OUTPATIENT)
Facility: HOSPITAL | Age: 63
End: 2023-08-17
Attending: FAMILY MEDICINE
Payer: COMMERCIAL

## 2023-08-17 ENCOUNTER — HOSPITAL ENCOUNTER (OUTPATIENT)
Facility: HOSPITAL | Age: 63
Discharge: HOME | End: 2023-08-17
Attending: FAMILY MEDICINE
Payer: COMMERCIAL

## 2023-08-17 DIAGNOSIS — Z12.31 SCREENING MAMMOGRAM FOR BREAST CANCER: ICD-10-CM

## 2023-08-17 DIAGNOSIS — R10.12 LUQ PAIN: ICD-10-CM

## 2023-08-17 PROCEDURE — 76700 US EXAM ABDOM COMPLETE: CPT

## 2023-08-17 PROCEDURE — 77063 BREAST TOMOSYNTHESIS BI: CPT

## 2023-08-21 ENCOUNTER — TELEPHONE (OUTPATIENT)
Dept: PRIMARY CARE CLINIC | Facility: CLINIC | Age: 63
End: 2023-08-21

## 2023-08-21 DIAGNOSIS — B35.1 TINEA UNGUIUM: Primary | ICD-10-CM

## 2023-08-21 NOTE — TELEPHONE ENCOUNTER
Patient called and stated that Dr. Diane Askew told her that she needed labs done after taking Terbinafine (Lamisil) 250mg. Please call patient back to let her know what lab work she needed to have done and when the lab orders are placed at #[phone].

## 2023-08-22 DIAGNOSIS — D72.810 LYMPHOPENIA: ICD-10-CM

## 2023-08-22 DIAGNOSIS — B35.1 TINEA UNGUIUM: ICD-10-CM

## 2023-08-22 LAB
ALBUMIN SERPL-MCNC: 3.5 G/DL (ref 3.5–5)
ALBUMIN/GLOB SERPL: 1.1 (ref 1.1–2.2)
ALP SERPL-CCNC: 97 U/L (ref 45–117)
ALT SERPL-CCNC: 40 U/L (ref 12–78)
ANION GAP SERPL CALC-SCNC: 2 MMOL/L (ref 5–15)
AST SERPL-CCNC: 22 U/L (ref 15–37)
BASOPHILS # BLD: 0 K/UL (ref 0–0.1)
BASOPHILS NFR BLD: 1 % (ref 0–1)
BILIRUB SERPL-MCNC: 0.2 MG/DL (ref 0.2–1)
BUN SERPL-MCNC: 14 MG/DL (ref 6–20)
BUN/CREAT SERPL: 32 (ref 12–20)
CALCIUM SERPL-MCNC: 8.8 MG/DL (ref 8.5–10.1)
CHLORIDE SERPL-SCNC: 108 MMOL/L (ref 97–108)
CO2 SERPL-SCNC: 29 MMOL/L (ref 21–32)
COMMENT:: NORMAL
CREAT SERPL-MCNC: 0.44 MG/DL (ref 0.55–1.02)
DIFFERENTIAL METHOD BLD: ABNORMAL
EOSINOPHIL # BLD: 0.1 K/UL (ref 0–0.4)
EOSINOPHIL NFR BLD: 2 % (ref 0–7)
ERYTHROCYTE [DISTWIDTH] IN BLOOD BY AUTOMATED COUNT: 13.6 % (ref 11.5–14.5)
GLOBULIN SER CALC-MCNC: 3.1 G/DL (ref 2–4)
GLUCOSE SERPL-MCNC: 91 MG/DL (ref 65–100)
HCT VFR BLD AUTO: 40 % (ref 35–47)
HGB BLD-MCNC: 12 G/DL (ref 11.5–16)
IMM GRANULOCYTES # BLD AUTO: 0 K/UL (ref 0–0.04)
IMM GRANULOCYTES NFR BLD AUTO: 1 % (ref 0–0.5)
LYMPHOCYTES # BLD: 0.9 K/UL (ref 0.8–3.5)
LYMPHOCYTES NFR BLD: 22 % (ref 12–49)
MCH RBC QN AUTO: 26 PG (ref 26–34)
MCHC RBC AUTO-ENTMCNC: 30 G/DL (ref 30–36.5)
MCV RBC AUTO: 86.8 FL (ref 80–99)
MONOCYTES # BLD: 0.4 K/UL (ref 0–1)
MONOCYTES NFR BLD: 10 % (ref 5–13)
NEUTS SEG # BLD: 2.6 K/UL (ref 1.8–8)
NEUTS SEG NFR BLD: 64 % (ref 32–75)
NRBC # BLD: 0 K/UL (ref 0–0.01)
NRBC BLD-RTO: 0 PER 100 WBC
PLATELET # BLD AUTO: 233 K/UL (ref 150–400)
PMV BLD AUTO: 10.7 FL (ref 8.9–12.9)
POTASSIUM SERPL-SCNC: 4.3 MMOL/L (ref 3.5–5.1)
PROT SERPL-MCNC: 6.6 G/DL (ref 6.4–8.2)
RBC # BLD AUTO: 4.61 M/UL (ref 3.8–5.2)
SODIUM SERPL-SCNC: 139 MMOL/L (ref 136–145)
SPECIMEN HOLD: NORMAL
WBC # BLD AUTO: 3.9 K/UL (ref 3.6–11)

## 2023-08-29 ENCOUNTER — HOSPITAL ENCOUNTER (OUTPATIENT)
Facility: HOSPITAL | Age: 63
Discharge: HOME | End: 2023-09-01
Payer: COMMERCIAL

## 2023-08-29 VITALS — HEIGHT: 66 IN | BODY MASS INDEX: 25.07 KG/M2 | WEIGHT: 156 LBS

## 2023-08-29 DIAGNOSIS — R92.8 ABNORMAL MAMMOGRAM OF LEFT BREAST: ICD-10-CM

## 2023-08-29 PROCEDURE — G0279 TOMOSYNTHESIS, MAMMO: HCPCS

## 2023-09-18 RX ORDER — MELATONIN
1 DAILY
Qty: 90 TABLET | Refills: 3 | Status: SHIPPED | OUTPATIENT
Start: 2023-09-18

## 2023-09-18 RX ORDER — AMLODIPINE BESYLATE 5 MG/1
2.5 TABLET ORAL DAILY
Qty: 45 TABLET | Refills: 1 | Status: SHIPPED | OUTPATIENT
Start: 2023-09-18

## 2023-09-18 RX ORDER — AMLODIPINE BESYLATE 5 MG/1
2.5 TABLET ORAL DAILY
Qty: 15 TABLET | Refills: 9 | OUTPATIENT
Start: 2023-09-18

## 2023-12-05 DIAGNOSIS — G43.909 MIGRAINE, UNSPECIFIED, NOT INTRACTABLE, WITHOUT STATUS MIGRAINOSUS: ICD-10-CM

## 2023-12-05 RX ORDER — BUTALBITAL, ACETAMINOPHEN AND CAFFEINE 50; 325; 40 MG/1; MG/1; MG/1
TABLET ORAL
Qty: 20 TABLET | Refills: 0 | Status: SHIPPED | OUTPATIENT
Start: 2023-12-05

## 2024-01-16 ENCOUNTER — OFFICE VISIT (OUTPATIENT)
Dept: PRIMARY CARE CLINIC | Facility: CLINIC | Age: 64
End: 2024-01-16
Payer: COMMERCIAL

## 2024-01-16 VITALS
OXYGEN SATURATION: 97 % | SYSTOLIC BLOOD PRESSURE: 129 MMHG | DIASTOLIC BLOOD PRESSURE: 74 MMHG | TEMPERATURE: 97.1 F | BODY MASS INDEX: 24.33 KG/M2 | HEIGHT: 66 IN | RESPIRATION RATE: 18 BRPM | HEART RATE: 76 BPM | WEIGHT: 151.4 LBS

## 2024-01-16 DIAGNOSIS — R05.1 ACUTE COUGH: ICD-10-CM

## 2024-01-16 DIAGNOSIS — I10 ESSENTIAL (PRIMARY) HYPERTENSION: ICD-10-CM

## 2024-01-16 DIAGNOSIS — E78.2 MIXED HYPERLIPIDEMIA: ICD-10-CM

## 2024-01-16 DIAGNOSIS — I10 ESSENTIAL (PRIMARY) HYPERTENSION: Primary | ICD-10-CM

## 2024-01-16 DIAGNOSIS — R73.9 ELEVATED BLOOD SUGAR: ICD-10-CM

## 2024-01-16 DIAGNOSIS — G43.809 OTHER MIGRAINE WITHOUT STATUS MIGRAINOSUS, NOT INTRACTABLE: ICD-10-CM

## 2024-01-16 DIAGNOSIS — D72.819 LEUKOPENIA, UNSPECIFIED TYPE: ICD-10-CM

## 2024-01-16 LAB
ERYTHROCYTE [DISTWIDTH] IN BLOOD BY AUTOMATED COUNT: 13.3 % (ref 11.5–14.5)
EST. AVERAGE GLUCOSE BLD GHB EST-MCNC: 114 MG/DL
HBA1C MFR BLD: 5.6 % (ref 4–5.6)
HCT VFR BLD AUTO: 40.4 % (ref 35–47)
HGB BLD-MCNC: 12.3 G/DL (ref 11.5–16)
MCH RBC QN AUTO: 26.6 PG (ref 26–34)
MCHC RBC AUTO-ENTMCNC: 30.4 G/DL (ref 30–36.5)
MCV RBC AUTO: 87.4 FL (ref 80–99)
NRBC # BLD: 0 K/UL (ref 0–0.01)
NRBC BLD-RTO: 0 PER 100 WBC
PLATELET # BLD AUTO: 189 K/UL (ref 150–400)
PMV BLD AUTO: 11.4 FL (ref 8.9–12.9)
RBC # BLD AUTO: 4.62 M/UL (ref 3.8–5.2)
WBC # BLD AUTO: 3.3 K/UL (ref 3.6–11)

## 2024-01-16 PROCEDURE — 3074F SYST BP LT 130 MM HG: CPT | Performed by: FAMILY MEDICINE

## 2024-01-16 PROCEDURE — 3078F DIAST BP <80 MM HG: CPT | Performed by: FAMILY MEDICINE

## 2024-01-16 RX ORDER — SUMATRIPTAN 50 MG/1
50 TABLET, FILM COATED ORAL
Qty: 9 TABLET | Refills: 3 | Status: SHIPPED | OUTPATIENT
Start: 2024-01-16 | End: 2024-01-16

## 2024-01-16 ASSESSMENT — PATIENT HEALTH QUESTIONNAIRE - PHQ9
3. TROUBLE FALLING OR STAYING ASLEEP: 0
SUM OF ALL RESPONSES TO PHQ9 QUESTIONS 1 & 2: 0
SUM OF ALL RESPONSES TO PHQ QUESTIONS 1-9: 0
SUM OF ALL RESPONSES TO PHQ QUESTIONS 1-9: 0
7. TROUBLE CONCENTRATING ON THINGS, SUCH AS READING THE NEWSPAPER OR WATCHING TELEVISION: 0
8. MOVING OR SPEAKING SO SLOWLY THAT OTHER PEOPLE COULD HAVE NOTICED. OR THE OPPOSITE, BEING SO FIGETY OR RESTLESS THAT YOU HAVE BEEN MOVING AROUND A LOT MORE THAN USUAL: 0
2. FEELING DOWN, DEPRESSED OR HOPELESS: 0
SUM OF ALL RESPONSES TO PHQ QUESTIONS 1-9: 0
1. LITTLE INTEREST OR PLEASURE IN DOING THINGS: 0
4. FEELING TIRED OR HAVING LITTLE ENERGY: 0
5. POOR APPETITE OR OVEREATING: 0
10. IF YOU CHECKED OFF ANY PROBLEMS, HOW DIFFICULT HAVE THESE PROBLEMS MADE IT FOR YOU TO DO YOUR WORK, TAKE CARE OF THINGS AT HOME, OR GET ALONG WITH OTHER PEOPLE: 0
SUM OF ALL RESPONSES TO PHQ QUESTIONS 1-9: 0
6. FEELING BAD ABOUT YOURSELF - OR THAT YOU ARE A FAILURE OR HAVE LET YOURSELF OR YOUR FAMILY DOWN: 0
9. THOUGHTS THAT YOU WOULD BE BETTER OFF DEAD, OR OF HURTING YOURSELF: 0

## 2024-01-16 NOTE — PROGRESS NOTES
Chief Complaint   Patient presents with    Skin Tag     Skin tag right side of neck     wants to get tested for diabetes     Medication Refill       BP (!) 143/77   Pulse 77   Temp 97.1 °F (36.2 °C) (Oral)   Resp 18   Ht 1.676 m (5' 6\")   Wt 68.7 kg (151 lb 6.4 oz)   SpO2 97%   BMI 24.44 kg/m²     1. Have you been to the ER, urgent care clinic since your last visit?  Hospitalized since your last visit?No    2. Have you seen or consulted any other health care providers outside of the [facility] since your last visit?  Include any pap smears or colon screening. No      3. For patients aged 45-75: Has the patient had a colonoscopy / FIT/ Cologuard? yes      If the patient is female:    4. For patients aged 40-74: Has the patient had a mammogram within the past 2 years? Yes      5. For patients aged 21-65: Has the patient had a pap smear? Yes

## 2024-01-16 NOTE — PROGRESS NOTES
Subjective  Kristin Fritz is an 63 y.o. female who presents for follow up.     Pmhx : COPD, HLD    Recently eval by pulm. Dx with copd.     C/o cough x 3 weeks. Started with flu like syndrome on 12/25/23. Sx mostly resolved but cough persisting. Nonproductive cough. No fever. No orthopnea.     HLD. She's been off rosuvastatin for at least a month.   HTN. On amlodipine.     Migraines. Uses fioricet or imitrex prn. She is having headaches 9-12 days per month.     Walks regularly for exercise and tolerates this well.       Allergies - reviewed:   Allergies   Allergen Reactions    Meloxicam Diarrhea    Codeine Diarrhea    Diclofenac Sodium Diarrhea         Medications - reviewed:   Current Outpatient Medications   Medication Sig    SUMAtriptan (IMITREX) 50 MG tablet Take 1 tablet by mouth once as needed for Migraine for migraine    verapamil (CALAN SR) 120 MG extended release tablet Take 1 tablet by mouth daily    butalbital-acetaminophen-caffeine (FIORICET, ESGIC) -40 MG per tablet TAKE 1 TABLET BY MOUTH EVERY 6 HOURS AS NEEDED HEADACHE    [employer] B-12 500 MCG tablet TAKE 1 TABLET BY MOUTH EVERY DAY    albuterol sulfate HFA (PROVENTIL;VENTOLIN;PROAIR) 108 (90 Base) MCG/ACT inhaler INHALE 2 PUFFS BY MOUTH EVERY 4 HOURS AS NEEDED FOR WHEEZING    ipratropium 0.5 mg-albuterol 2.5 mg (DUONEB) 0.5-2.5 (3) MG/3ML SOLN nebulizer solution INHALE 3ML VIA NEBULIZER BY MOUTH EVERY 6 HOURS AS NEEDED FOR ASTHMA AND COPD    vitamin D3 (CHOLECALCIFEROL) 25 MCG (1000 UT) TABS tablet TAKE 1 TABLET BY MOUTH EVERY DAY    amLODIPine (NORVASC) 5 MG tablet Take 0.5 tablets by mouth daily    dicyclomine (BENTYL) 10 MG capsule TAKE 1 CAPSULE BY MOUTH TWICE DAILY IN THE MORNING AND EVENING    fluticasone-salmeterol (ADVAIR) 250-50 MCG/ACT AEPB diskus inhaler Inhale 1 puff into the lungs 2 times daily    tiZANidine (ZANAFLEX) 4 MG tablet Take 1 tablet by mouth 3 times daily as needed     No current facility-administered medications for this

## 2024-01-17 LAB
ALBUMIN SERPL-MCNC: 3.6 G/DL (ref 3.5–5)
ALBUMIN/GLOB SERPL: 1.2 (ref 1.1–2.2)
ALP SERPL-CCNC: 95 U/L (ref 45–117)
ALT SERPL-CCNC: 15 U/L (ref 12–78)
ANION GAP SERPL CALC-SCNC: 5 MMOL/L (ref 5–15)
AST SERPL-CCNC: 8 U/L (ref 15–37)
BILIRUB SERPL-MCNC: 0.2 MG/DL (ref 0.2–1)
BUN SERPL-MCNC: 12 MG/DL (ref 6–20)
BUN/CREAT SERPL: 31 (ref 12–20)
CALCIUM SERPL-MCNC: 9.2 MG/DL (ref 8.5–10.1)
CHLORIDE SERPL-SCNC: 106 MMOL/L (ref 97–108)
CHOLEST SERPL-MCNC: 232 MG/DL
CO2 SERPL-SCNC: 30 MMOL/L (ref 21–32)
CREAT SERPL-MCNC: 0.39 MG/DL (ref 0.55–1.02)
GLOBULIN SER CALC-MCNC: 2.9 G/DL (ref 2–4)
GLUCOSE SERPL-MCNC: 92 MG/DL (ref 65–100)
HDLC SERPL-MCNC: 78 MG/DL
HDLC SERPL: 3 (ref 0–5)
LDLC SERPL CALC-MCNC: 127.2 MG/DL (ref 0–100)
POTASSIUM SERPL-SCNC: 4.1 MMOL/L (ref 3.5–5.1)
PROT SERPL-MCNC: 6.5 G/DL (ref 6.4–8.2)
SODIUM SERPL-SCNC: 141 MMOL/L (ref 136–145)
TRIGL SERPL-MCNC: 134 MG/DL
VLDLC SERPL CALC-MCNC: 26.8 MG/DL

## 2024-01-18 ENCOUNTER — HOSPITAL ENCOUNTER (OUTPATIENT)
Facility: HOSPITAL | Age: 64
Discharge: HOME | End: 2024-01-18
Attending: FAMILY MEDICINE
Payer: COMMERCIAL

## 2024-01-18 DIAGNOSIS — R05.1 ACUTE COUGH: ICD-10-CM

## 2024-01-18 PROCEDURE — 71046 X-RAY EXAM CHEST 2 VIEWS: CPT

## 2024-02-15 DIAGNOSIS — E78.5 HYPERLIPIDEMIA, UNSPECIFIED: ICD-10-CM

## 2024-02-19 RX ORDER — ROSUVASTATIN CALCIUM 5 MG/1
5 TABLET, COATED ORAL NIGHTLY
Qty: 90 TABLET | Refills: 0 | OUTPATIENT
Start: 2024-02-19

## 2024-02-19 RX ORDER — ALBUTEROL SULFATE 90 UG/1
AEROSOL, METERED RESPIRATORY (INHALATION)
Qty: 8.5 EACH | Refills: 1 | Status: SHIPPED | OUTPATIENT
Start: 2024-02-19

## 2024-02-19 NOTE — TELEPHONE ENCOUNTER
Requested Prescriptions     Pending Prescriptions Disp Refills    albuterol sulfate HFA (PROVENTIL;VENTOLIN;PROAIR) 108 (90 Base) MCG/ACT inhaler [Pharmacy Med Name: ALBUTEROL HFA (PROAIR) INHALER] 8.5 each 1     Sig: TAKE 2 PUFFS BY MOUTH EVERY 4 HOURS AS NEEDED FOR WHEEZE     Refused Prescriptions Disp Refills    rosuvastatin (CRESTOR) 5 MG tablet [Pharmacy Med Name: ROSUVASTATIN CALCIUM 5 MG TAB] 90 tablet 0     Sig: TAKE 1 TABLET BY MOUTH EVERY DAY AT NIGHT        Last Visit 1/16/24  Last Refill 10/23/23

## 2024-03-12 ENCOUNTER — OFFICE VISIT (OUTPATIENT)
Dept: PRIMARY CARE CLINIC | Facility: CLINIC | Age: 64
End: 2024-03-12
Payer: COMMERCIAL

## 2024-03-12 VITALS
WEIGHT: 152 LBS | HEIGHT: 66 IN | DIASTOLIC BLOOD PRESSURE: 77 MMHG | HEART RATE: 71 BPM | RESPIRATION RATE: 19 BRPM | OXYGEN SATURATION: 97 % | SYSTOLIC BLOOD PRESSURE: 146 MMHG | BODY MASS INDEX: 24.43 KG/M2

## 2024-03-12 DIAGNOSIS — I10 ESSENTIAL (PRIMARY) HYPERTENSION: ICD-10-CM

## 2024-03-12 DIAGNOSIS — G43.709 CHRONIC MIGRAINE WITHOUT AURA WITHOUT STATUS MIGRAINOSUS, NOT INTRACTABLE: ICD-10-CM

## 2024-03-12 DIAGNOSIS — R06.83 SNORING: Primary | ICD-10-CM

## 2024-03-12 PROCEDURE — 3078F DIAST BP <80 MM HG: CPT | Performed by: FAMILY MEDICINE

## 2024-03-12 PROCEDURE — 3077F SYST BP >= 140 MM HG: CPT | Performed by: FAMILY MEDICINE

## 2024-03-12 PROCEDURE — 99214 OFFICE O/P EST MOD 30 MIN: CPT | Performed by: FAMILY MEDICINE

## 2024-03-12 RX ORDER — LOSARTAN POTASSIUM 50 MG/1
50 TABLET ORAL DAILY
Qty: 30 TABLET | Refills: 1 | Status: SHIPPED | OUTPATIENT
Start: 2024-03-12

## 2024-03-12 NOTE — PROGRESS NOTES
Subjective  Kristin Fritz is an 63 y.o. female who presents for follow up.    Pmhx : COPD, HLD, HTN, Migraines.      Recently eval by pulm. Dx with copd.     HLD. She's been off rosuvastatin for at least a month.   HTN. On verapamil. Home bp mildly elevated.      Migraines.   Waking up daily with headaches for the past 2 months. Using triptan 2-3 days per week.      Last OV stopped amlodipine and started verapamil daily.     Walks regularly for exercise and tolerates this well.   Admits to unhealthy diet recently.      Allergies - reviewed:   Allergies   Allergen Reactions    Meloxicam Diarrhea    Codeine Diarrhea    Diclofenac Sodium Diarrhea         Medications - reviewed:   Current Outpatient Medications   Medication Sig    losartan (COZAAR) 50 MG tablet Take 1 tablet by mouth daily    albuterol sulfate HFA (PROVENTIL;VENTOLIN;PROAIR) 108 (90 Base) MCG/ACT inhaler TAKE 2 PUFFS BY MOUTH EVERY 4 HOURS AS NEEDED FOR WHEEZE    butalbital-acetaminophen-caffeine (FIORICET, ESGIC) -40 MG per tablet TAKE 1 TABLET BY MOUTH EVERY 6 HOURS AS NEEDED HEADACHE    [employer]S B-12 500 MCG tablet TAKE 1 TABLET BY MOUTH EVERY DAY    ipratropium 0.5 mg-albuterol 2.5 mg (DUONEB) 0.5-2.5 (3) MG/3ML SOLN nebulizer solution INHALE 3ML VIA NEBULIZER BY MOUTH EVERY 6 HOURS AS NEEDED FOR ASTHMA AND COPD    vitamin D3 (CHOLECALCIFEROL) 25 MCG (1000 UT) TABS tablet TAKE 1 TABLET BY MOUTH EVERY DAY    dicyclomine (BENTYL) 10 MG capsule TAKE 1 CAPSULE BY MOUTH TWICE DAILY IN THE MORNING AND EVENING    fluticasone-salmeterol (ADVAIR) 250-50 MCG/ACT AEPB diskus inhaler Inhale 1 puff into the lungs 2 times daily    tiZANidine (ZANAFLEX) 4 MG tablet Take 1 tablet by mouth 3 times daily as needed    SUMAtriptan (IMITREX) 50 MG tablet Take 1 tablet by mouth once as needed for Migraine for migraine     No current facility-administered medications for this visit.           ROS  CONSTITUTIONAL: Denies fever, chills, unintentional weight

## 2024-03-12 NOTE — PROGRESS NOTES
\"Have you been to the ER, urgent care clinic since your last visit?  Hospitalized since your last visit?\"    NO    “Have you seen or consulted any other health care providers outside of [facility] since your last visit?”    NO

## 2024-03-18 RX ORDER — CALCIUM CARB/VIT D3/MINERALS 600 MG-200
TABLET,CHEWABLE ORAL
Qty: 30 TABLET | Refills: 2 | Status: SHIPPED | OUTPATIENT
Start: 2024-03-18

## 2024-04-23 ENCOUNTER — APPOINTMENT (OUTPATIENT)
Facility: HOSPITAL | Age: 64
End: 2024-04-23
Payer: COMMERCIAL

## 2024-04-23 ENCOUNTER — OFFICE VISIT (OUTPATIENT)
Age: 64
End: 2024-04-23

## 2024-04-23 ENCOUNTER — HOSPITAL ENCOUNTER (EMERGENCY)
Facility: HOSPITAL | Age: 64
Discharge: HOME | End: 2024-04-23
Attending: EMERGENCY MEDICINE
Payer: COMMERCIAL

## 2024-04-23 VITALS
HEART RATE: 86 BPM | DIASTOLIC BLOOD PRESSURE: 81 MMHG | RESPIRATION RATE: 16 BRPM | OXYGEN SATURATION: 93 % | BODY MASS INDEX: 25.34 KG/M2 | WEIGHT: 156.97 LBS | TEMPERATURE: 97.8 F | SYSTOLIC BLOOD PRESSURE: 164 MMHG

## 2024-04-23 VITALS
HEIGHT: 66 IN | BODY MASS INDEX: 25.01 KG/M2 | RESPIRATION RATE: 18 BRPM | OXYGEN SATURATION: 95 % | TEMPERATURE: 97.6 F | WEIGHT: 155.65 LBS | SYSTOLIC BLOOD PRESSURE: 143 MMHG | DIASTOLIC BLOOD PRESSURE: 78 MMHG | HEART RATE: 90 BPM

## 2024-04-23 DIAGNOSIS — R10.13 EPIGASTRIC PAIN: ICD-10-CM

## 2024-04-23 DIAGNOSIS — R05.9 COUGH, UNSPECIFIED TYPE: Primary | ICD-10-CM

## 2024-04-23 DIAGNOSIS — R10.13 EPIGASTRIC PAIN: Primary | ICD-10-CM

## 2024-04-23 DIAGNOSIS — R14.0 ABDOMINAL BLOATING: ICD-10-CM

## 2024-04-23 DIAGNOSIS — R04.2 HEMOPTYSIS: ICD-10-CM

## 2024-04-23 DIAGNOSIS — R03.0 ELEVATED BLOOD PRESSURE READING: ICD-10-CM

## 2024-04-23 LAB
ALBUMIN SERPL-MCNC: 3.5 G/DL (ref 3.5–5)
ALBUMIN/GLOB SERPL: 0.9 (ref 1.1–2.2)
ALP SERPL-CCNC: 107 U/L (ref 45–117)
ALT SERPL-CCNC: 16 U/L (ref 12–78)
ANION GAP SERPL CALC-SCNC: 4 MMOL/L (ref 5–15)
APPEARANCE UR: CLEAR
AST SERPL-CCNC: 9 U/L (ref 15–37)
BACTERIA URNS QL MICRO: NEGATIVE /HPF
BASOPHILS # BLD: 0 K/UL (ref 0–0.1)
BASOPHILS NFR BLD: 1 % (ref 0–1)
BILIRUB SERPL-MCNC: 0.3 MG/DL (ref 0.2–1)
BILIRUB UR QL: NEGATIVE
BUN SERPL-MCNC: 12 MG/DL (ref 6–20)
BUN/CREAT SERPL: 21 (ref 12–20)
CALCIUM SERPL-MCNC: 9.6 MG/DL (ref 8.5–10.1)
CHLORIDE SERPL-SCNC: 107 MMOL/L (ref 97–108)
CO2 SERPL-SCNC: 29 MMOL/L (ref 21–32)
COLOR UR: NORMAL
COMMENT:: NORMAL
CREAT SERPL-MCNC: 0.57 MG/DL (ref 0.55–1.02)
DIFFERENTIAL METHOD BLD: ABNORMAL
EOSINOPHIL # BLD: 0.1 K/UL (ref 0–0.4)
EOSINOPHIL NFR BLD: 2 % (ref 0–7)
EPITH CASTS URNS QL MICRO: NORMAL /LPF
ERYTHROCYTE [DISTWIDTH] IN BLOOD BY AUTOMATED COUNT: 13.8 % (ref 11.5–14.5)
GLOBULIN SER CALC-MCNC: 3.9 G/DL (ref 2–4)
GLUCOSE SERPL-MCNC: 96 MG/DL (ref 65–100)
GLUCOSE UR STRIP.AUTO-MCNC: NEGATIVE MG/DL
HCT VFR BLD AUTO: 37.5 % (ref 35–47)
HGB BLD-MCNC: 11.8 G/DL (ref 11.5–16)
HGB UR QL STRIP: NEGATIVE
HYALINE CASTS URNS QL MICRO: NORMAL /LPF (ref 0–5)
IMM GRANULOCYTES # BLD AUTO: 0 K/UL (ref 0–0.04)
IMM GRANULOCYTES NFR BLD AUTO: 0 % (ref 0–0.5)
KETONES UR QL STRIP.AUTO: NEGATIVE MG/DL
LEUKOCYTE ESTERASE UR QL STRIP.AUTO: NEGATIVE
LIPASE SERPL-CCNC: 27 U/L (ref 13–75)
LYMPHOCYTES # BLD: 0.6 K/UL (ref 0.8–3.5)
LYMPHOCYTES NFR BLD: 13 % (ref 12–49)
Lab: NORMAL
MAGNESIUM SERPL-MCNC: 1.8 MG/DL (ref 1.6–2.4)
MCH RBC QN AUTO: 26.4 PG (ref 26–34)
MCHC RBC AUTO-ENTMCNC: 31.5 G/DL (ref 30–36.5)
MCV RBC AUTO: 83.9 FL (ref 80–99)
MONOCYTES # BLD: 0.4 K/UL (ref 0–1)
MONOCYTES NFR BLD: 8 % (ref 5–13)
NEUTS SEG # BLD: 3.4 K/UL (ref 1.8–8)
NEUTS SEG NFR BLD: 76 % (ref 32–75)
NITRITE UR QL STRIP.AUTO: NEGATIVE
NRBC # BLD: 0 K/UL (ref 0–0.01)
NRBC BLD-RTO: 0 PER 100 WBC
PERFORMING INSTRUMENT: NORMAL
PH UR STRIP: 6 (ref 5–8)
PLATELET # BLD AUTO: 237 K/UL (ref 150–400)
PMV BLD AUTO: 9.8 FL (ref 8.9–12.9)
POTASSIUM SERPL-SCNC: 3.1 MMOL/L (ref 3.5–5.1)
PROT SERPL-MCNC: 7.4 G/DL (ref 6.4–8.2)
PROT UR STRIP-MCNC: NEGATIVE MG/DL
QC PASS/FAIL: NORMAL
RBC # BLD AUTO: 4.47 M/UL (ref 3.8–5.2)
RBC #/AREA URNS HPF: NORMAL /HPF (ref 0–5)
RBC MORPH BLD: ABNORMAL
SARS-COV-2, POC: NORMAL
SODIUM SERPL-SCNC: 140 MMOL/L (ref 136–145)
SP GR UR REFRACTOMETRY: 1.02 (ref 1–1.03)
SPECIMEN HOLD: NORMAL
SPECIMEN HOLD: NORMAL
UROBILINOGEN UR QL STRIP.AUTO: 1 EU/DL (ref 0.2–1)
WBC # BLD AUTO: 4.5 K/UL (ref 3.6–11)
WBC URNS QL MICRO: NORMAL /HPF (ref 0–4)

## 2024-04-23 PROCEDURE — 83735 ASSAY OF MAGNESIUM: CPT

## 2024-04-23 PROCEDURE — 85025 COMPLETE CBC W/AUTO DIFF WBC: CPT

## 2024-04-23 PROCEDURE — 81001 URINALYSIS AUTO W/SCOPE: CPT

## 2024-04-23 PROCEDURE — 83690 ASSAY OF LIPASE: CPT

## 2024-04-23 PROCEDURE — 36415 COLL VENOUS BLD VENIPUNCTURE: CPT

## 2024-04-23 PROCEDURE — C9113 INJ PANTOPRAZOLE SODIUM, VIA: HCPCS | Performed by: EMERGENCY MEDICINE

## 2024-04-23 PROCEDURE — 99285 EMERGENCY DEPT VISIT HI MDM: CPT

## 2024-04-23 PROCEDURE — 96375 TX/PRO/DX INJ NEW DRUG ADDON: CPT

## 2024-04-23 PROCEDURE — 71046 X-RAY EXAM CHEST 2 VIEWS: CPT

## 2024-04-23 PROCEDURE — 2580000003 HC RX 258: Performed by: EMERGENCY MEDICINE

## 2024-04-23 PROCEDURE — A4216 STERILE WATER/SALINE, 10 ML: HCPCS | Performed by: EMERGENCY MEDICINE

## 2024-04-23 PROCEDURE — 74177 CT ABD & PELVIS W/CONTRAST: CPT

## 2024-04-23 PROCEDURE — 6370000000 HC RX 637 (ALT 250 FOR IP): Performed by: EMERGENCY MEDICINE

## 2024-04-23 PROCEDURE — 96374 THER/PROPH/DIAG INJ IV PUSH: CPT

## 2024-04-23 PROCEDURE — 6360000004 HC RX CONTRAST MEDICATION: Performed by: EMERGENCY MEDICINE

## 2024-04-23 PROCEDURE — 6360000002 HC RX W HCPCS: Performed by: EMERGENCY MEDICINE

## 2024-04-23 PROCEDURE — 80053 COMPREHEN METABOLIC PANEL: CPT

## 2024-04-23 RX ORDER — POTASSIUM CHLORIDE 750 MG/1
40 TABLET, FILM COATED, EXTENDED RELEASE ORAL ONCE
Status: COMPLETED | OUTPATIENT
Start: 2024-04-23 | End: 2024-04-23

## 2024-04-23 RX ORDER — ONDANSETRON 2 MG/ML
4 INJECTION INTRAMUSCULAR; INTRAVENOUS
Status: COMPLETED | OUTPATIENT
Start: 2024-04-23 | End: 2024-04-23

## 2024-04-23 RX ORDER — OMEPRAZOLE 40 MG/1
40 CAPSULE, DELAYED RELEASE ORAL
Qty: 30 CAPSULE | Refills: 0 | Status: SHIPPED | OUTPATIENT
Start: 2024-04-23

## 2024-04-23 RX ORDER — 0.9 % SODIUM CHLORIDE 0.9 %
1000 INTRAVENOUS SOLUTION INTRAVENOUS ONCE
Status: COMPLETED | OUTPATIENT
Start: 2024-04-23 | End: 2024-04-23

## 2024-04-23 RX ORDER — FLUTICASONE FUROATE, UMECLIDINIUM BROMIDE AND VILANTEROL TRIFENATATE 100; 62.5; 25 UG/1; UG/1; UG/1
1 POWDER RESPIRATORY (INHALATION) DAILY
COMMUNITY

## 2024-04-23 RX ADMIN — SODIUM CHLORIDE 40 MG: 9 INJECTION, SOLUTION INTRAMUSCULAR; INTRAVENOUS; SUBCUTANEOUS at 15:25

## 2024-04-23 RX ADMIN — IOPAMIDOL 100 ML: 755 INJECTION, SOLUTION INTRAVENOUS at 15:35

## 2024-04-23 RX ADMIN — SODIUM CHLORIDE 1000 ML: 9 INJECTION, SOLUTION INTRAVENOUS at 15:24

## 2024-04-23 RX ADMIN — POTASSIUM CHLORIDE 40 MEQ: 750 TABLET, FILM COATED, EXTENDED RELEASE ORAL at 16:20

## 2024-04-23 RX ADMIN — ONDANSETRON 4 MG: 2 INJECTION INTRAMUSCULAR; INTRAVENOUS at 15:24

## 2024-04-23 ASSESSMENT — PAIN DESCRIPTION - DESCRIPTORS: DESCRIPTORS: OTHER (COMMENT)

## 2024-04-23 ASSESSMENT — PAIN - FUNCTIONAL ASSESSMENT
PAIN_FUNCTIONAL_ASSESSMENT: 0-10
PAIN_FUNCTIONAL_ASSESSMENT: PREVENTS OR INTERFERES SOME ACTIVE ACTIVITIES AND ADLS

## 2024-04-23 ASSESSMENT — ENCOUNTER SYMPTOMS
BACK PAIN: 0
COUGH: 1
VOMITING: 0
COLOR CHANGE: 0
NAUSEA: 0
SHORTNESS OF BREATH: 0
ABDOMINAL PAIN: 1
TROUBLE SWALLOWING: 0

## 2024-04-23 ASSESSMENT — PAIN DESCRIPTION - LOCATION: LOCATION: ABDOMEN

## 2024-04-23 ASSESSMENT — PAIN SCALES - GENERAL: PAINLEVEL_OUTOF10: 6

## 2024-04-23 ASSESSMENT — PAIN DESCRIPTION - ORIENTATION: ORIENTATION: UPPER

## 2024-04-23 NOTE — PATIENT INSTRUCTIONS
GO TO ER FOR FURTHER EVALUATION & MANAGEMENT.     Discussed potential concerns for: EPIGASTRIC ABDOMINAL PAIN , .   Recommend patient to seek care at the nearest emergency department for further evaluation.    Transportation:  private car

## 2024-04-23 NOTE — PROGRESS NOTES
Normocephalic and atraumatic.      Right Ear: External ear normal.      Left Ear: External ear normal.      Nose: Nose normal. No congestion.      Mouth/Throat:      Mouth: Mucous membranes are moist.   Eyes:      Conjunctiva/sclera: Conjunctivae normal.   Cardiovascular:      Rate and Rhythm: Normal rate and regular rhythm.   Pulmonary:      Effort: Pulmonary effort is normal. No respiratory distress.      Breath sounds: Normal breath sounds.   Abdominal:      General: Abdomen is flat. Bowel sounds are normal.      Palpations: Abdomen is soft.      Tenderness: There is abdominal tenderness in the epigastric area and left upper quadrant. There is guarding.       Musculoskeletal:         General: Normal range of motion.      Cervical back: Normal range of motion.   Skin:     General: Skin is warm.      Findings: No rash.   Neurological:      General: No focal deficit present.      Mental Status: She is alert and oriented to person, place, and time. Mental status is at baseline.   Psychiatric:         Mood and Affect: Mood normal.         Behavior: Behavior normal.         Thought Content: Thought content normal.         Judgment: Judgment normal.        VITAL SIGNS:   Vitals:    04/23/24 1234   BP: (!) 143/78   Site: Left Upper Arm   Position: Sitting   Cuff Size: Medium Adult   Pulse: 90   Resp: 18   Temp: 97.6 °F (36.4 °C)   SpO2: 95%   Weight: 70.6 kg (155 lb 10.3 oz)   Height: 1.676 m (5' 6\")               Michelle Hayward MD

## 2024-04-23 NOTE — ED TRIAGE NOTES
Pt c/o upper abd pain x 3-4 days, pt has mesh in her esophagus d/t heartburn, pt stated she spit up blood , bright red this am , unsure if it was from her stomach or lungs, denies fever, denies fever, denies n/v, denies diarrhea or constipation, no blood thinners

## 2024-04-23 NOTE — ED PROVIDER NOTES
visualized and preliminarily interpreted by the emergency physician with the below findings:        Interpretation per the Radiologist below, if available at the time of this note:    CT ABDOMEN PELVIS W IV CONTRAST Additional Contrast? None   Final Result   No acute findings in the abdomen or pelvis.      XR CHEST (2 VW)   Final Result   No acute disease. No significant interval change.              LABS:  Labs Reviewed   CBC WITH AUTO DIFFERENTIAL - Abnormal; Notable for the following components:       Result Value    Neutrophils % 76 (*)     Lymphocytes Absolute 0.6 (*)     All other components within normal limits   COMPREHENSIVE METABOLIC PANEL - Abnormal; Notable for the following components:    Potassium 3.1 (*)     Anion Gap 4 (*)     Bun/Cre Ratio 21 (*)     AST 9 (*)     Albumin/Globulin Ratio 0.9 (*)     All other components within normal limits   URINE CULTURE HOLD SAMPLE   LIPASE   MAGNESIUM   URINALYSIS WITH MICROSCOPIC   EXTRA TUBES HOLD       All other labs were within normal range or not returned as of this dictation.    EMERGENCY DEPARTMENT COURSE and DIFFERENTIAL DIAGNOSIS/MDM:   Vitals:    Vitals:    04/23/24 1406   BP: (!) 164/81   Pulse: 86   Resp: 16   Temp: 97.8 °F (36.6 °C)   TempSrc: Oral   SpO2: 93%   Weight: 71.2 kg (156 lb 15.5 oz)           Medical Decision Making  Amount and/or Complexity of Data Reviewed  Labs: ordered.  Radiology: ordered.    Risk  Prescription drug management.            REASSESSMENT    Patient has been reexamined and denies any further complaints of pain or discomfort.  She is tolerating p.o. fluids well.  Recommend trying extended release Prilosec daily for 1 month and have close follow-up with her gastroenterologist for further evaluation and treatment.  She was also given referral to pulmonology.    5:18 PM  Patient's results and plan of care have been reviewed with her.  Patient and/or family have verbally conveyed their understanding and agreement of the

## 2024-05-16 RX ORDER — IPRATROPIUM BROMIDE AND ALBUTEROL SULFATE 2.5; .5 MG/3ML; MG/3ML
SOLUTION RESPIRATORY (INHALATION)
Qty: 90 ML | Refills: 1 | Status: SHIPPED | OUTPATIENT
Start: 2024-05-16

## 2024-05-16 RX ORDER — ALBUTEROL SULFATE 90 UG/1
AEROSOL, METERED RESPIRATORY (INHALATION)
Qty: 8.5 EACH | Refills: 1 | Status: SHIPPED | OUTPATIENT
Start: 2024-05-16

## 2024-05-16 RX ORDER — SUMATRIPTAN 50 MG/1
50 TABLET, FILM COATED ORAL
Qty: 9 TABLET | Refills: 3 | Status: SHIPPED | OUTPATIENT
Start: 2024-05-16 | End: 2024-05-16

## 2024-05-16 RX ORDER — LOSARTAN POTASSIUM 50 MG/1
50 TABLET ORAL DAILY
Qty: 30 TABLET | Refills: 1 | Status: SHIPPED | OUTPATIENT
Start: 2024-05-16

## 2024-06-06 ENCOUNTER — OFFICE VISIT (OUTPATIENT)
Age: 64
End: 2024-06-06
Payer: COMMERCIAL

## 2024-06-06 VITALS
OXYGEN SATURATION: 92 % | TEMPERATURE: 98.2 F | WEIGHT: 151.7 LBS | HEART RATE: 83 BPM | BODY MASS INDEX: 24.38 KG/M2 | DIASTOLIC BLOOD PRESSURE: 79 MMHG | HEIGHT: 66 IN | SYSTOLIC BLOOD PRESSURE: 134 MMHG

## 2024-06-06 DIAGNOSIS — G47.33 OSA (OBSTRUCTIVE SLEEP APNEA): Primary | ICD-10-CM

## 2024-06-06 DIAGNOSIS — F41.9 ANXIETY AND DEPRESSION: ICD-10-CM

## 2024-06-06 DIAGNOSIS — I10 PRIMARY HYPERTENSION: ICD-10-CM

## 2024-06-06 DIAGNOSIS — J44.9 CHRONIC OBSTRUCTIVE PULMONARY DISEASE, UNSPECIFIED COPD TYPE (HCC): ICD-10-CM

## 2024-06-06 DIAGNOSIS — F32.A ANXIETY AND DEPRESSION: ICD-10-CM

## 2024-06-06 PROCEDURE — 99204 OFFICE O/P NEW MOD 45 MIN: CPT | Performed by: INTERNAL MEDICINE

## 2024-06-06 PROCEDURE — 3075F SYST BP GE 130 - 139MM HG: CPT | Performed by: INTERNAL MEDICINE

## 2024-06-06 PROCEDURE — 3078F DIAST BP <80 MM HG: CPT | Performed by: INTERNAL MEDICINE

## 2024-06-06 RX ORDER — FLUTICASONE PROPIONATE 50 MCG
SPRAY, SUSPENSION (ML) NASAL
COMMUNITY
Start: 2021-02-09

## 2024-06-06 RX ORDER — AZATHIOPRINE 50 MG/1
50 TABLET ORAL DAILY
COMMUNITY

## 2024-06-06 RX ORDER — LORATADINE 10 MG/1
TABLET ORAL
COMMUNITY
Start: 2021-02-09

## 2024-06-06 ASSESSMENT — SLEEP AND FATIGUE QUESTIONNAIRES
HOW LIKELY ARE YOU TO NOD OFF OR FALL ASLEEP WHEN YOU ARE A PASSENGER IN A CAR FOR AN HOUR WITHOUT A BREAK: MODERATE CHANCE OF DOZING
HOW LIKELY ARE YOU TO NOD OFF OR FALL ASLEEP IN A CAR, WHILE STOPPED FOR A FEW MINUTES IN TRAFFIC: WOULD NEVER DOZE
ESS TOTAL SCORE: 11
HOW LIKELY ARE YOU TO NOD OFF OR FALL ASLEEP WHILE SITTING AND READING: HIGH CHANCE OF DOZING
HOW LIKELY ARE YOU TO NOD OFF OR FALL ASLEEP WHILE WATCHING TV: HIGH CHANCE OF DOZING
HOW LIKELY ARE YOU TO NOD OFF OR FALL ASLEEP WHILE SITTING INACTIVE IN A PUBLIC PLACE: WOULD NEVER DOZE
HOW LIKELY ARE YOU TO NOD OFF OR FALL ASLEEP WHILE LYING DOWN TO REST IN THE AFTERNOON WHEN CIRCUMSTANCES PERMIT: HIGH CHANCE OF DOZING
HOW LIKELY ARE YOU TO NOD OFF OR FALL ASLEEP WHILE SITTING AND TALKING TO SOMEONE: WOULD NEVER DOZE
HOW LIKELY ARE YOU TO NOD OFF OR FALL ASLEEP WHILE SITTING QUIETLY AFTER LUNCH WITHOUT ALCOHOL: WOULD NEVER DOZE

## 2024-06-06 NOTE — PATIENT INSTRUCTIONS
spray.  When should you call for help?  Watch closely for changes in your health, and be sure to contact your doctor if:  You still have sleep apnea even though you have made lifestyle changes.  You are thinking of trying a device such as CPAP.  You are having problems using a CPAP or similar machine.                Where can you learn more?   Go to http://www.[employer].net/Devorahcours.  Enter J936 in the search box to learn more about \"Sleep Apnea: After Your Visit.\"   © 9062-1843 Healthwise, Incorporated. Care instructions adapted under license by [employer] (which disclaims liability or warranty for this information). This care instruction is for use with your licensed healthcare professional. If you have questions about a medical condition or this instruction, always ask your healthcare professional. [employer] disclaims any warranty or liability for your use of this information.      PROPER SLEEP HYGIENE    What to avoid  Do not have drinks with caffeine, such as coffee or black tea, for 8 hours before bed.  Do not smoke or use other types of tobacco near bedtime. Nicotine is a stimulant and can keep you awake.  Avoid drinking alcohol late in the evening, because it can cause you to wake in the middle of the night.  Do not eat a big meal close to bedtime. If you are hungry, eat a light snack.  Do not drink a lot of water close to bedtime, because the need to urinate may wake you up during the night.  Do not read or watch TV in bed. Use the bed only for sleeping and sexual activity.  What to try  Go to bed at the same time every night, and wake up at the same time every morning. Do not take naps during the day.  Keep your bedroom quiet, dark, and cool.  Get regular exercise, but not within 3 to 4 hours of your bedtime..  Sleep on a comfortable pillow and mattress.  If watching the clock makes you anxious, turn it facing away from you so you cannot see the time.  If you worry when you lie down, start a worry

## 2024-07-22 NOTE — TELEPHONE ENCOUNTER
PCP: Katherine Syed DO    Last Visit 3/12/2024   Future Appointments   Date Time Provider Department Center   9/6/2024  8:30 PM BEDROOM 1 [facility]        Requested Prescriptions     Pending Prescriptions Disp Refills    ipratropium 0.5 mg-albuterol 2.5 mg (DUONEB) 0.5-2.5 (3) MG/3ML SOLN nebulizer solution [Pharmacy Med Name: IPRAT-ALBUT 0.5-3(2.5) MG/3 ML] 90 mL 1     Sig: INHALE 3ML VIA NEBULIZER BY MOUTH EVERY 6 HOURS AS NEEDED FOR ASTHMA AND COPD    albuterol sulfate HFA (PROVENTIL;VENTOLIN;PROAIR) 108 (90 Base) MCG/ACT inhaler [Pharmacy Med Name: ALBUTEROL HFA (PROAIR) INHALER] 8.5 each 1     Sig: INHALE 2 PUFFS BY MOUTH EVERY 4 HOURS AS NEEDED FOR WHEEZING    losartan (COZAAR) 50 MG tablet [Pharmacy Med Name: LOSARTAN POTASSIUM 50 MG TAB] 30 tablet 1     Sig: TAKE 1 TABLET BY MOUTH EVERY DAY         Other Comments: Last Refill   05/16/24

## 2024-07-23 RX ORDER — LOSARTAN POTASSIUM 50 MG/1
50 TABLET ORAL DAILY
Qty: 90 TABLET | Refills: 0 | Status: SHIPPED | OUTPATIENT
Start: 2024-07-23

## 2024-07-23 RX ORDER — IPRATROPIUM BROMIDE AND ALBUTEROL SULFATE 2.5; .5 MG/3ML; MG/3ML
SOLUTION RESPIRATORY (INHALATION)
Qty: 90 ML | Refills: 1 | Status: SHIPPED | OUTPATIENT
Start: 2024-07-23

## 2024-07-23 RX ORDER — ALBUTEROL SULFATE 90 UG/1
2 AEROSOL, METERED RESPIRATORY (INHALATION) EVERY 4 HOURS PRN
Qty: 8.5 EACH | Refills: 1 | Status: SHIPPED | OUTPATIENT
Start: 2024-07-23

## 2024-08-21 ENCOUNTER — TELEPHONE (OUTPATIENT)
Dept: PRIMARY CARE CLINIC | Facility: CLINIC | Age: 64
End: 2024-08-21

## 2024-08-21 NOTE — TELEPHONE ENCOUNTER
Spoke to pt who states that for the pass 3 weeks she has had itchiness from her head to her toes and it feels like something is crawling on her. Pt states that [facility] and they only did lab work. Pt would like to know if you can see her asap. Would you like to double book this pt?

## 2024-08-21 NOTE — TELEPHONE ENCOUNTER
Patient said [facility]. Patient said she doesn't have a rash, she is just itchy. Patient said she is also urinating a lot. Patient asked for an appointment as soon as possible.

## 2024-08-22 ENCOUNTER — TELEPHONE (OUTPATIENT)
Age: 64
End: 2024-08-22

## 2024-08-22 NOTE — TELEPHONE ENCOUNTER
Called to offer patient a sooner appt but she wanted to cancel her appt due to having other health issues. She said she owuld call back in the future if she wishes to reschedule.    Ara

## 2024-08-23 NOTE — TELEPHONE ENCOUNTER
Left voicemail asking pt to call office back Dr. Syed would like to see pt on 08/26/24 at 8am ok to double book.

## 2024-08-29 ENCOUNTER — OFFICE VISIT (OUTPATIENT)
Age: 64
End: 2024-08-29

## 2024-08-29 VITALS
BODY MASS INDEX: 25.33 KG/M2 | RESPIRATION RATE: 18 BRPM | DIASTOLIC BLOOD PRESSURE: 84 MMHG | HEART RATE: 91 BPM | HEIGHT: 66 IN | TEMPERATURE: 97.6 F | OXYGEN SATURATION: 93 % | WEIGHT: 157.6 LBS | SYSTOLIC BLOOD PRESSURE: 134 MMHG

## 2024-08-29 DIAGNOSIS — L29.9 ITCHING: Primary | ICD-10-CM

## 2024-08-29 RX ORDER — CETIRIZINE HYDROCHLORIDE, PSEUDOEPHEDRINE HYDROCHLORIDE 5; 120 MG/1; MG/1
1 TABLET, FILM COATED, EXTENDED RELEASE ORAL 2 TIMES DAILY
Qty: 60 TABLET | Refills: 0 | Status: SHIPPED | OUTPATIENT
Start: 2024-08-29 | End: 2024-09-28

## 2024-08-29 RX ORDER — METHYLPREDNISOLONE 4 MG
TABLET, DOSE PACK ORAL
Qty: 21 KIT | Refills: 0 | Status: SHIPPED | OUTPATIENT
Start: 2024-08-29 | End: 2024-09-04

## 2024-08-29 NOTE — PATIENT INSTRUCTIONS
If symptoms worsens or fail to improve follow-up with PCP or ER.    Thank you for visiting [facility] today.    Patient Instructions:  -Rest and drink plenty of fluids  -Zyrtec:  Take Zyrtec in the morning for a daytime antihistamine  -Prednisone Steroid Burst:  Preferably should be taken in the morning with food and should NOT to take any NSAIDs while on Prednisone as it can increase risk of gastrointestinal bleeding    If you begin to have shortness of breath or swelling in your mouth or throat, go to the ER.

## 2024-08-29 NOTE — PROGRESS NOTES
Kristin Fritz (:  1960) is a 63 y.o. female,New patient, here for evaluation of the following chief complaint(s):  full body itching (Patient has been experiencing full body itching for [id number]. )      Assessment & Plan :    Below is the assessment and plan developed based on review of history, physical exam.    1. Itching  Patient appears well, VSS, afebrile, SPO2 93% on room air. No distress noted. Patient presents with internal itching throughout her body. Patient was bit by jellyfish 3 weeks ago when the itching started. Patient presents with itching of unknown etiology. No rash present at the time. Patient itching is internal.  Patient advised of treatment plan as indicated below, but discussed if symptoms persist or worsen, they will need to follow up with their PCP to evaluate further.  Pt discharged with instructions to go to Emergency Department for sensation of throat closing, facial swelling, difficulty swallowing, difficulty breathing, shortness of breath, chest pain and uncontrolled fever above 100.4F. Patient advised to follow-up with PCP or ER if symptoms fail to improve or worsens. Patient verbalized understanding, no questions or concerns at this time.      - cetirizine-psuedoephedrine (ZYRTEC-D) 5-120 MG per extended release tablet; Take 1 tablet by mouth 2 times daily  Dispense: 60 tablet; Refill: 0  - methylPREDNISolone (MEDROL DOSEPACK) 4 MG tablet; Take by mouth.  Dispense: 21 kit; Refill: 0     1. Handout given with care instructions.     2. OTC for symptom management. Increase fluid intake.     3. Follow-up with PCP as needed.     4. Go to ED with development of any acute symptoms.         Follow-up    Follow-up with PCP or ER immediately for any new worsening or changes or if symptoms are not improving over the next 5-7 days.      Subjective :  HPI     63 y.o. female presents with symptoms of itching with no rash. Patient states she was stung by 3 different jelly fish 3 weeks  and right after the itching started. Patient has used Benadryl and Zyrtec with no relief. Patient applied lotion with mild relief. Patient has appointment with PCP on Tuesday but was told to come to urgent care if symptoms worsens. Patient states she has a history of Lupus.    Vitals:    08/29/24 1346 08/29/24 1353   BP: (!) 142/83 134/84   Site: Left Upper Arm    Position: Sitting    Cuff Size: Medium Adult    Pulse: 91    Resp: 18    Temp: 97.6 °F (36.4 °C)    SpO2: 93%    Weight: 71.5 kg (157 lb 9.6 oz)    Height: 1.676 m (5' 6\")        No results found for this visit on 08/29/24.      Objective   Physical Exam  Constitutional:       Appearance: Normal appearance.   Cardiovascular:      Rate and Rhythm: Normal rate and regular rhythm.      Heart sounds: Normal heart sounds.   Pulmonary:      Effort: Pulmonary effort is normal.      Breath sounds: Normal breath sounds.   Skin:     General: Skin is warm and dry.      Findings: No rash.      Comments: Patient has internal itching   Neurological:      Mental Status: She is alert and oriented to person, place, and time.               An electronic signature was used to authenticate this note.    ADIS Waterman NP

## 2024-09-03 ENCOUNTER — OFFICE VISIT (OUTPATIENT)
Dept: PRIMARY CARE CLINIC | Facility: CLINIC | Age: 64
End: 2024-09-03
Payer: COMMERCIAL

## 2024-09-03 VITALS
RESPIRATION RATE: 12 BRPM | SYSTOLIC BLOOD PRESSURE: 114 MMHG | HEIGHT: 66 IN | OXYGEN SATURATION: 97 % | HEART RATE: 92 BPM | BODY MASS INDEX: 25.36 KG/M2 | WEIGHT: 157.8 LBS | DIASTOLIC BLOOD PRESSURE: 76 MMHG | TEMPERATURE: 97.1 F

## 2024-09-03 DIAGNOSIS — M35.00 SJOGREN'S SYNDROME, WITH UNSPECIFIED ORGAN INVOLVEMENT (HCC): ICD-10-CM

## 2024-09-03 DIAGNOSIS — R20.2 PARESTHESIA: ICD-10-CM

## 2024-09-03 DIAGNOSIS — I10 ESSENTIAL (PRIMARY) HYPERTENSION: ICD-10-CM

## 2024-09-03 DIAGNOSIS — L29.9 GENERALIZED PRURITUS: ICD-10-CM

## 2024-09-03 DIAGNOSIS — R79.89 ABNORMAL TSH: ICD-10-CM

## 2024-09-03 DIAGNOSIS — D50.9 MICROCYTIC ANEMIA: Primary | ICD-10-CM

## 2024-09-03 DIAGNOSIS — R35.0 URINARY FREQUENCY: ICD-10-CM

## 2024-09-03 DIAGNOSIS — L29.9 GENERALIZED PRURITUS: Primary | ICD-10-CM

## 2024-09-03 PROCEDURE — 3078F DIAST BP <80 MM HG: CPT | Performed by: FAMILY MEDICINE

## 2024-09-03 PROCEDURE — 3074F SYST BP LT 130 MM HG: CPT | Performed by: FAMILY MEDICINE

## 2024-09-03 PROCEDURE — 99214 OFFICE O/P EST MOD 30 MIN: CPT | Performed by: FAMILY MEDICINE

## 2024-09-03 SDOH — ECONOMIC STABILITY: FOOD INSECURITY: WITHIN THE PAST 12 MONTHS, YOU WORRIED THAT YOUR FOOD WOULD RUN OUT BEFORE YOU GOT MONEY TO BUY MORE.: NEVER TRUE

## 2024-09-03 SDOH — ECONOMIC STABILITY: FOOD INSECURITY: WITHIN THE PAST 12 MONTHS, THE FOOD YOU BOUGHT JUST DIDN'T LAST AND YOU DIDN'T HAVE MONEY TO GET MORE.: NEVER TRUE

## 2024-09-03 SDOH — ECONOMIC STABILITY: INCOME INSECURITY: HOW HARD IS IT FOR YOU TO PAY FOR THE VERY BASICS LIKE FOOD, HOUSING, MEDICAL CARE, AND HEATING?: NOT HARD AT ALL

## 2024-09-03 ASSESSMENT — PATIENT HEALTH QUESTIONNAIRE - PHQ9
SUM OF ALL RESPONSES TO PHQ QUESTIONS 1-9: 0
SUM OF ALL RESPONSES TO PHQ QUESTIONS 1-9: 0
1. LITTLE INTEREST OR PLEASURE IN DOING THINGS: NOT AT ALL
2. FEELING DOWN, DEPRESSED OR HOPELESS: NOT AT ALL
SUM OF ALL RESPONSES TO PHQ9 QUESTIONS 1 & 2: 0
SUM OF ALL RESPONSES TO PHQ QUESTIONS 1-9: 0
SUM OF ALL RESPONSES TO PHQ QUESTIONS 1-9: 0

## 2024-09-03 NOTE — PROGRESS NOTES
\"Have you been to the ER, urgent care clinic since your last visit?  Hospitalized since your last visit?\"    Pt when to urgent care for itching     “Have you seen or consulted any other health care providers outside of [facility] since your last visit?”    NO    Have you had a mammogram?”   NO    Date of last Mammogram: 8/29/2023             Click Here for Release of Records Request

## 2024-09-03 NOTE — PROGRESS NOTES
Subjective  Kristin Fritz is an 63 y.o. female who presents for pruritus.     Patient complains of generalized pruritus for the past few weeks.  Notes this started after she sustained a Jellyfish bite while on a trip to the beach.  Evaluated by urgent care on August 29, started over-the-counter cetirizine and a Medrol dose pack.  She notes the itching is somewhat improved but still persisting.  Generalized itching which she also describes a sensation of something crawling under her skin all over.  No other known new exposures.  No recent illness.  No fevers or weight loss.  No cough or sore throat.  No chest pain or difficulty breathing.  No GI complaints or abdominal pain.    Complains of chronic urinary frequency.  Been going on for quite some time.  She avoids artificial sweetener and alcohol.        Allergies - reviewed:       ROS  CONSTITUTIONAL: Denies fever, chills, unintentional weight loss.  CARDIOVASCULAR: Denies chest pain, orthopnea, PND.  RESPIRATORY: Denies cough, dyspnea.  GI: Denies abdominal pain, diarrhea, constipation, black or bloody stool.         Physical Exam  /76 (Site: Left Upper Arm, Position: Sitting, Cuff Size: Medium Adult)   Pulse 92   Temp 97.1 °F (36.2 °C) (Temporal)   Resp 12   Ht 1.676 m (5' 6\")   Wt 71.6 kg (157 lb 12.8 oz)   SpO2 97%   BMI 25.47 kg/m²   Physical Exam  Vitals reviewed.   Constitutional:       Appearance: Normal appearance.   HENT:      Head: Atraumatic.   Eyes:      Conjunctiva/sclera: Conjunctivae normal.   Cardiovascular:      Rate and Rhythm: Normal rate and regular rhythm.      Heart sounds: Normal heart sounds.   Pulmonary:      Effort: Pulmonary effort is normal.      Breath sounds: Normal breath sounds.   Abdominal:      General: There is no distension.      Palpations: Abdomen is soft. There is no mass.      Tenderness: There is no abdominal tenderness.   Musculoskeletal:      Right lower leg: No edema.      Left lower leg: No edema.

## 2024-09-04 LAB
ALBUMIN SERPL-MCNC: 3.9 G/DL (ref 3.5–5)
ALBUMIN/GLOB SERPL: 1.1 (ref 1.1–2.2)
ALP SERPL-CCNC: 105 U/L (ref 45–117)
ALT SERPL-CCNC: 19 U/L (ref 12–78)
ANION GAP SERPL CALC-SCNC: 5 MMOL/L (ref 5–15)
AST SERPL-CCNC: 11 U/L (ref 15–37)
BASOPHILS # BLD: 0.1 K/UL (ref 0–0.1)
BASOPHILS NFR BLD: 1 % (ref 0–1)
BILIRUB SERPL-MCNC: 0.4 MG/DL (ref 0.2–1)
BUN SERPL-MCNC: 12 MG/DL (ref 6–20)
BUN/CREAT SERPL: 21 (ref 12–20)
CALCIUM SERPL-MCNC: 9.6 MG/DL (ref 8.5–10.1)
CHLORIDE SERPL-SCNC: 102 MMOL/L (ref 97–108)
CO2 SERPL-SCNC: 29 MMOL/L (ref 21–32)
CREAT SERPL-MCNC: 0.56 MG/DL (ref 0.55–1.02)
DIFFERENTIAL METHOD BLD: ABNORMAL
EOSINOPHIL # BLD: 0.1 K/UL (ref 0–0.4)
EOSINOPHIL NFR BLD: 2 % (ref 0–7)
ERYTHROCYTE [DISTWIDTH] IN BLOOD BY AUTOMATED COUNT: 16.5 % (ref 11.5–14.5)
ERYTHROCYTE [SEDIMENTATION RATE] IN BLOOD: 24 MM/HR (ref 0–30)
GLOBULIN SER CALC-MCNC: 3.4 G/DL (ref 2–4)
GLUCOSE SERPL-MCNC: 98 MG/DL (ref 65–100)
HCT VFR BLD AUTO: 36.9 % (ref 35–47)
HGB BLD-MCNC: 10.5 G/DL (ref 11.5–16)
IMM GRANULOCYTES # BLD AUTO: 0 K/UL (ref 0–0.04)
IMM GRANULOCYTES NFR BLD AUTO: 0 % (ref 0–0.5)
LYMPHOCYTES # BLD: 0.9 K/UL (ref 0.8–3.5)
LYMPHOCYTES NFR BLD: 13 % (ref 12–49)
MCH RBC QN AUTO: 21.6 PG (ref 26–34)
MCHC RBC AUTO-ENTMCNC: 28.5 G/DL (ref 30–36.5)
MCV RBC AUTO: 75.8 FL (ref 80–99)
MONOCYTES # BLD: 0.5 K/UL (ref 0–1)
MONOCYTES NFR BLD: 7 % (ref 5–13)
NEUTS SEG # BLD: 5.2 K/UL (ref 1.8–8)
NEUTS SEG NFR BLD: 77 % (ref 32–75)
NRBC # BLD: 0 K/UL (ref 0–0.01)
NRBC BLD-RTO: 0 PER 100 WBC
PERIPHERAL SMEAR, MD REVIEW: NORMAL
PLATELET # BLD AUTO: 373 K/UL (ref 150–400)
PMV BLD AUTO: 10.8 FL (ref 8.9–12.9)
POTASSIUM SERPL-SCNC: 4.2 MMOL/L (ref 3.5–5.1)
PROT SERPL-MCNC: 7.3 G/DL (ref 6.4–8.2)
RBC # BLD AUTO: 4.87 M/UL (ref 3.8–5.2)
RBC MORPH BLD: ABNORMAL
RBC MORPH BLD: ABNORMAL
SODIUM SERPL-SCNC: 136 MMOL/L (ref 136–145)
TSH SERPL DL<=0.05 MIU/L-ACNC: 0.34 UIU/ML (ref 0.36–3.74)
VIT B12 SERPL-MCNC: 384 PG/ML (ref 193–986)
WBC # BLD AUTO: 6.8 K/UL (ref 3.6–11)
WBC MORPH BLD: ABNORMAL

## 2024-09-09 DIAGNOSIS — D50.9 MICROCYTIC ANEMIA: ICD-10-CM

## 2024-09-12 RX ORDER — IPRATROPIUM BROMIDE AND ALBUTEROL SULFATE 2.5; .5 MG/3ML; MG/3ML
SOLUTION RESPIRATORY (INHALATION)
Qty: 90 ML | Refills: 1 | Status: SHIPPED | OUTPATIENT
Start: 2024-09-12

## 2024-09-23 ENCOUNTER — PATIENT MESSAGE (OUTPATIENT)
Dept: PRIMARY CARE CLINIC | Facility: CLINIC | Age: 64
End: 2024-09-23

## 2024-09-23 DIAGNOSIS — L29.9 GENERALIZED PRURITUS: Primary | ICD-10-CM

## 2024-10-09 RX ORDER — LOSARTAN POTASSIUM 50 MG/1
50 TABLET ORAL DAILY
Qty: 90 TABLET | Refills: 1 | Status: SHIPPED | OUTPATIENT
Start: 2024-10-09

## 2024-10-09 RX ORDER — SUMATRIPTAN 50 MG/1
50 TABLET, FILM COATED ORAL
Qty: 9 TABLET | Refills: 5 | Status: SHIPPED | OUTPATIENT
Start: 2024-10-09 | End: 2024-10-09

## 2024-10-09 RX ORDER — ALBUTEROL SULFATE 90 UG/1
INHALANT RESPIRATORY (INHALATION)
Qty: 8.5 EACH | Refills: 5 | Status: SHIPPED | OUTPATIENT
Start: 2024-10-09

## 2024-10-09 NOTE — TELEPHONE ENCOUNTER
PCP: Katherine Syed DO    Last Visit 9/3/2024   No future appointments.    Requested Prescriptions     Pending Prescriptions Disp Refills    SUMAtriptan (IMITREX) 50 MG tablet [Pharmacy Med Name: SUMATRIPTAN SUCC 50 MG TABLET] 9 tablet 3     Sig: TAKE 1 TABLET BY MOUTH ONCE AS NEEDED FOR MIGRAINE FOR MIGRAINE    albuterol sulfate HFA (PROVENTIL;VENTOLIN;PROAIR) 108 (90 Base) MCG/ACT inhaler [Pharmacy Med Name: ALBUTEROL HFA (PROAIR) INHALER] 8.5 each 1     Sig: TAKE 2 PUFFS BY MOUTH EVERY 4 HOURS AS NEEDED FOR WHEEZE    losartan (COZAAR) 50 MG tablet [Pharmacy Med Name: LOSARTAN POTASSIUM 50 MG TAB] 90 tablet 0     Sig: TAKE 1 TABLET BY MOUTH EVERY DAY         Other Comments: Last Refill   Imitrex 05/16/24  Albuterol 07/23/24  Losartan 07/23/24

## 2024-10-21 ENCOUNTER — OFFICE VISIT (OUTPATIENT)
Dept: PRIMARY CARE CLINIC | Facility: CLINIC | Age: 64
End: 2024-10-21
Payer: COMMERCIAL

## 2024-10-21 ENCOUNTER — TELEPHONE (OUTPATIENT)
Dept: PRIMARY CARE CLINIC | Facility: CLINIC | Age: 64
End: 2024-10-21

## 2024-10-21 VITALS
TEMPERATURE: 97.1 F | WEIGHT: 161 LBS | BODY MASS INDEX: 25.88 KG/M2 | RESPIRATION RATE: 12 BRPM | OXYGEN SATURATION: 96 % | HEART RATE: 100 BPM | SYSTOLIC BLOOD PRESSURE: 126 MMHG | DIASTOLIC BLOOD PRESSURE: 70 MMHG | HEIGHT: 66 IN

## 2024-10-21 DIAGNOSIS — M79.604 LEG PAIN, BILATERAL: ICD-10-CM

## 2024-10-21 DIAGNOSIS — R79.89 ABNORMAL TSH: ICD-10-CM

## 2024-10-21 DIAGNOSIS — D50.9 MICROCYTIC ANEMIA: Primary | ICD-10-CM

## 2024-10-21 DIAGNOSIS — M79.605 LEG PAIN, BILATERAL: ICD-10-CM

## 2024-10-21 DIAGNOSIS — D50.9 MICROCYTIC ANEMIA: ICD-10-CM

## 2024-10-21 DIAGNOSIS — R07.9 CHEST PAIN, UNSPECIFIED TYPE: Primary | ICD-10-CM

## 2024-10-21 PROCEDURE — 99214 OFFICE O/P EST MOD 30 MIN: CPT | Performed by: FAMILY MEDICINE

## 2024-10-21 PROCEDURE — 93000 ELECTROCARDIOGRAM COMPLETE: CPT | Performed by: FAMILY MEDICINE

## 2024-10-21 SDOH — ECONOMIC STABILITY: FOOD INSECURITY: WITHIN THE PAST 12 MONTHS, THE FOOD YOU BOUGHT JUST DIDN'T LAST AND YOU DIDN'T HAVE MONEY TO GET MORE.: NEVER TRUE

## 2024-10-21 SDOH — ECONOMIC STABILITY: FOOD INSECURITY: WITHIN THE PAST 12 MONTHS, YOU WORRIED THAT YOUR FOOD WOULD RUN OUT BEFORE YOU GOT MONEY TO BUY MORE.: NEVER TRUE

## 2024-10-21 SDOH — ECONOMIC STABILITY: INCOME INSECURITY: HOW HARD IS IT FOR YOU TO PAY FOR THE VERY BASICS LIKE FOOD, HOUSING, MEDICAL CARE, AND HEATING?: NOT HARD AT ALL

## 2024-10-21 ASSESSMENT — PATIENT HEALTH QUESTIONNAIRE - PHQ9
SUM OF ALL RESPONSES TO PHQ QUESTIONS 1-9: 0
SUM OF ALL RESPONSES TO PHQ9 QUESTIONS 1 & 2: 0
SUM OF ALL RESPONSES TO PHQ QUESTIONS 1-9: 0
2. FEELING DOWN, DEPRESSED OR HOPELESS: NOT AT ALL
1. LITTLE INTEREST OR PLEASURE IN DOING THINGS: NOT AT ALL

## 2024-10-21 NOTE — TELEPHONE ENCOUNTER
Patient called wanting to find out if Dr. Syed was going to call in something to her pharmacy at [facility] on [address] for her itching.  Please reach back out to patient at Ph#[phone]

## 2024-10-21 NOTE — PROGRESS NOTES
\"Have you been to the ER, urgent care clinic since your last visit?  Hospitalized since your last visit?\"    NO    “Have you seen or consulted any other health care providers outside of [facility] since your last visit?”    NO    Have you had a mammogram?”   NO    Date of last Mammogram: 8/29/2023             Click Here for Release of Records Request

## 2024-10-21 NOTE — PROGRESS NOTES
Subjective  Kristin Fritz is an 64 y.o. female who presents for leg pains.      Pmhx : COPD, HLD, HTN, Migraines.      Recently eval by pulm. Dx with copd.      HLD. She's been off rosuvastatin for at least a month.   HTN. On verapamil. Home bp mildly elevated.      Migraines.     C/o bilat lower leg pains. Notices this when she walks. If she continues to walk her pain does improve.   Pain feels like burning / stinging.   Improves with rest. Sometimes she is still having pain with resting.   No injury.      Denies alcohol use.     Endorses intermittent chest pains, occurs when walking. This started several months ago. Relieved with rest. Episodes of chest pain resolve within about 5-10 min.   Chronic RUSHING. No change in this.   No chest pain in clinic today.      Allergies - reviewed:   Allergies   Allergen Reactions    Meloxicam Diarrhea    Codeine Diarrhea    Diclofenac Sodium Diarrhea         Medications - reviewed:   Current Outpatient Medications   Medication Sig    SUMAtriptan (IMITREX) 50 MG tablet Take 1 tablet by mouth once as needed for Migraine for migraine    albuterol sulfate HFA (PROVENTIL;VENTOLIN;PROAIR) 108 (90 Base) MCG/ACT inhaler TAKE 2 PUFFS BY MOUTH EVERY 4 HOURS AS NEEDED FOR WHEEZE    losartan (COZAAR) 50 MG tablet TAKE 1 TABLET BY MOUTH EVERY DAY    ipratropium 0.5 mg-albuterol 2.5 mg (DUONEB) 0.5-2.5 (3) MG/3ML SOLN nebulizer solution INHALE 3ML VIA NEBULIZER BY MOUTH EVERY 6 HOURS AS NEEDED FOR ASTHMA AND COPD    fluticasone-umeclidin-vilant (TRELEGY ELLIPTA) 100-62.5-25 MCG/ACT AEPB inhaler Inhale 1 puff into the lungs daily    omeprazole (PRILOSEC) 40 MG delayed release capsule Take 1 capsule by mouth every morning (before breakfast)    tiZANidine (ZANAFLEX) 4 MG tablet Take 1 tablet by mouth 3 times daily as needed     No current facility-administered medications for this visit.         ROS  CONSTITUTIONAL: Denies fever, chills, unintentional weight loss.  CARDIOVASCULAR: Denies

## 2024-10-22 ENCOUNTER — TELEPHONE (OUTPATIENT)
Dept: PRIMARY CARE CLINIC | Facility: CLINIC | Age: 64
End: 2024-10-22

## 2024-10-22 LAB
BASOPHILS # BLD: 0.1 K/UL (ref 0–0.1)
BASOPHILS NFR BLD: 1 % (ref 0–1)
DIFFERENTIAL METHOD BLD: ABNORMAL
EOSINOPHIL # BLD: 0.2 K/UL (ref 0–0.4)
EOSINOPHIL NFR BLD: 3 % (ref 0–7)
ERYTHROCYTE [DISTWIDTH] IN BLOOD BY AUTOMATED COUNT: 18.1 % (ref 11.5–14.5)
FERRITIN SERPL-MCNC: 5 NG/ML (ref 8–252)
HCT VFR BLD AUTO: 31.4 % (ref 35–47)
HGB BLD-MCNC: 8.8 G/DL (ref 11.5–16)
IMM GRANULOCYTES # BLD AUTO: 0 K/UL (ref 0–0.04)
IMM GRANULOCYTES NFR BLD AUTO: 0 % (ref 0–0.5)
IRON SATN MFR SERPL: 3 % (ref 20–50)
IRON SERPL-MCNC: 13 UG/DL (ref 35–150)
LYMPHOCYTES # BLD: 1 K/UL (ref 0.8–3.5)
LYMPHOCYTES NFR BLD: 18 % (ref 12–49)
MCH RBC QN AUTO: 20.1 PG (ref 26–34)
MCHC RBC AUTO-ENTMCNC: 28 G/DL (ref 30–36.5)
MCV RBC AUTO: 71.9 FL (ref 80–99)
MONOCYTES # BLD: 0.5 K/UL (ref 0–1)
MONOCYTES NFR BLD: 9 % (ref 5–13)
NEUTS SEG # BLD: 3.5 K/UL (ref 1.8–8)
NEUTS SEG NFR BLD: 69 % (ref 32–75)
NRBC # BLD: 0 K/UL (ref 0–0.01)
NRBC BLD-RTO: 0 PER 100 WBC
PLATELET # BLD AUTO: 317 K/UL (ref 150–400)
PMV BLD AUTO: 10.8 FL (ref 8.9–12.9)
RBC # BLD AUTO: 4.37 M/UL (ref 3.8–5.2)
RBC MORPH BLD: ABNORMAL
T4 SERPL-MCNC: 11.6 UG/DL (ref 4.8–13.9)
TIBC SERPL-MCNC: 489 UG/DL (ref 250–450)
TSH SERPL DL<=0.05 MIU/L-ACNC: 0.73 UIU/ML (ref 0.36–3.74)
WBC # BLD AUTO: 5.3 K/UL (ref 3.6–11)

## 2024-10-23 ENCOUNTER — HOSPITAL ENCOUNTER (OUTPATIENT)
Facility: HOSPITAL | Age: 64
Discharge: HOME | End: 2024-10-26
Attending: FAMILY MEDICINE
Payer: COMMERCIAL

## 2024-10-23 ENCOUNTER — TELEPHONE (OUTPATIENT)
Dept: PRIMARY CARE CLINIC | Facility: CLINIC | Age: 64
End: 2024-10-23

## 2024-10-23 DIAGNOSIS — M79.605 LEG PAIN, BILATERAL: ICD-10-CM

## 2024-10-23 DIAGNOSIS — M79.604 LEG PAIN, BILATERAL: ICD-10-CM

## 2024-10-23 PROCEDURE — 93970 EXTREMITY STUDY: CPT

## 2024-10-23 NOTE — TELEPHONE ENCOUNTER
Spoke to pt aware that Dr. Syed would like pt to come in to have stool tested to make sure she dosent have any blood in her stool. Pt states that she is unable to have a bowl movement but is going to the stool today to but something that will help and will come in tomorrow.

## 2024-10-24 ENCOUNTER — OFFICE VISIT (OUTPATIENT)
Dept: PRIMARY CARE CLINIC | Facility: CLINIC | Age: 64
End: 2024-10-24
Payer: COMMERCIAL

## 2024-10-24 ENCOUNTER — PATIENT MESSAGE (OUTPATIENT)
Dept: PRIMARY CARE CLINIC | Facility: CLINIC | Age: 64
End: 2024-10-24

## 2024-10-24 VITALS
TEMPERATURE: 97 F | SYSTOLIC BLOOD PRESSURE: 121 MMHG | RESPIRATION RATE: 18 BRPM | OXYGEN SATURATION: 96 % | HEART RATE: 96 BPM | BODY MASS INDEX: 25.55 KG/M2 | DIASTOLIC BLOOD PRESSURE: 72 MMHG | HEIGHT: 66 IN | WEIGHT: 159 LBS

## 2024-10-24 DIAGNOSIS — D50.9 MICROCYTIC ANEMIA: Primary | ICD-10-CM

## 2024-10-24 LAB — METHYLMALONATE SERPL-SCNC: 257 NMOL/L (ref 0–378)

## 2024-10-24 PROCEDURE — 99213 OFFICE O/P EST LOW 20 MIN: CPT | Performed by: FAMILY MEDICINE

## 2024-10-24 RX ORDER — FERROUS SULFATE 325(65) MG
325 TABLET ORAL 2 TIMES DAILY
Qty: 60 TABLET | Refills: 1 | Status: SHIPPED | OUTPATIENT
Start: 2024-10-24

## 2024-10-24 ASSESSMENT — PATIENT HEALTH QUESTIONNAIRE - PHQ9
1. LITTLE INTEREST OR PLEASURE IN DOING THINGS: NOT AT ALL
SUM OF ALL RESPONSES TO PHQ9 QUESTIONS 1 & 2: 0
SUM OF ALL RESPONSES TO PHQ QUESTIONS 1-9: 0
SUM OF ALL RESPONSES TO PHQ QUESTIONS 1-9: 0
2. FEELING DOWN, DEPRESSED OR HOPELESS: NOT AT ALL
SUM OF ALL RESPONSES TO PHQ QUESTIONS 1-9: 0
SUM OF ALL RESPONSES TO PHQ QUESTIONS 1-9: 0

## 2024-10-24 NOTE — PROGRESS NOTES
Subjective  Kristin Fritz is an 64 y.o. female who presents for follow up.     Patient comes in to follow-up.  Most recent labs indicating anemia, iron deficiency.  Hemoglobin has decreased from 10.5-8.8 in the past 6 weeks.    She did not do stool fit test.  She is not seeing any rectal bleeding.  also reports she had a colonoscopy and upper endoscopy about 6 months ago.  Lab Results   Component Value Date    WBC 5.3 10/21/2024    HGB 8.8 (L) 10/21/2024    HCT 31.4 (L) 10/21/2024    MCV 71.9 (L) 10/21/2024     10/21/2024           Allergies - reviewed:   Allergies   Allergen Reactions    Meloxicam Diarrhea    Codeine Diarrhea    Diclofenac Sodium Diarrhea         Medications - reviewed:   Current Outpatient Medications   Medication Sig    SUMAtriptan (IMITREX) 50 MG tablet Take 1 tablet by mouth once as needed for Migraine for migraine    albuterol sulfate HFA (PROVENTIL;VENTOLIN;PROAIR) 108 (90 Base) MCG/ACT inhaler TAKE 2 PUFFS BY MOUTH EVERY 4 HOURS AS NEEDED FOR WHEEZE    losartan (COZAAR) 50 MG tablet TAKE 1 TABLET BY MOUTH EVERY DAY    ipratropium 0.5 mg-albuterol 2.5 mg (DUONEB) 0.5-2.5 (3) MG/3ML SOLN nebulizer solution INHALE 3ML VIA NEBULIZER BY MOUTH EVERY 6 HOURS AS NEEDED FOR ASTHMA AND COPD    fluticasone-umeclidin-vilant (TRELEGY ELLIPTA) 100-62.5-25 MCG/ACT AEPB inhaler Inhale 1 puff into the lungs daily    omeprazole (PRILOSEC) 40 MG delayed release capsule Take 1 capsule by mouth every morning (before breakfast)    tiZANidine (ZANAFLEX) 4 MG tablet Take 1 tablet by mouth 3 times daily as needed     No current facility-administered medications for this visit.           ROS  CONSTITUTIONAL: Denies fever, chills, unintentional weight loss.  CARDIOVASCULAR: Denies chest pain.  RESPIRATORY: Chronic stable dyspnea on exertion.  No change in the symptoms.  GI: Denies abdominal pain, diarrhea, constipation, black or bloody stool.         Physical Exam  /72 (Site: Left Upper Arm, Position:

## 2024-10-24 NOTE — PROGRESS NOTES
\"Have you been to the ER, urgent care clinic since your last visit?  Hospitalized since your last visit?\"    NO      “Have you seen or consulted any other health care providers outside our system since your last visit?”    NO      Have you had a mammogram?”   Date of last Mammogram: 8/29/2023       Chief Complaint   Patient presents with    Stool testing

## 2024-10-28 ENCOUNTER — TELEPHONE (OUTPATIENT)
Age: 64
End: 2024-10-28

## 2024-10-28 RX ORDER — CETIRIZINE HYDROCHLORIDE 10 MG/1
10 TABLET ORAL DAILY
Qty: 30 TABLET | Refills: 0 | Status: SHIPPED | OUTPATIENT
Start: 2024-10-28

## 2024-10-31 PROBLEM — D50.9 IRON DEFICIENCY ANEMIA: Status: ACTIVE | Noted: 2024-10-31

## 2024-10-31 NOTE — PROGRESS NOTES
[facility]  [address], [address] [facility] [place] [facility] [address]  W: [phone]  F: [phone]    Reason for Visit:   Kristin Fritz is a 64 y.o. female with a history of lupus, Sjogren's disease, PUD who is seen in consultation at the request of Dr. Joanie Syed for evaluation of iron deficiency anemia.    History of Present Illness:   Kristin Fritz is a pleasant 64 y.o. female who presents today for evaluation of iron deficiency anemia.    Labs 10/21/24: H/H 8.8/31.4, ferritin 5, iron 13, iron sat 3%.   She believes she may have been iron deficient during one of her pregnancies but cannot recall if she was on oral iron.   Over the last year, she has been worked up by Dr. Rodriguez and Dr. Salazar for heartburn/epigastric discomfort. She underwent laparoscopic revision of fundoplication to a partial fundoplication 2023 at [facility] (Dr. Toy Rodriguez). She has undergone several upper GI exams over the last year. She believes she had a colonoscopy ~1 year ago with a normal report. She has a history of a bleeding ulcer. She takes omeprazole daily. Her last visit with Dr. Salazar was a couple of months ago and she was told to follow up in 5 years.   She was instructed per PCP to start oral iron ~1 week ago. She endorses constipation.     She presents today to establish care. She feels fatigued all the time. 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 She does not eat ice but craves cold water. She endorses RUSHING and restless leg. Endorses weight gain. Denies cold intolerance. She does not take NSAIDS. Denies hematochezia. No obvious signs of bleeding.     She has never required a prior IV iron infusion or blood transfusion    Family History:  Sister - RAIMUNDO, breast cancer  Mother - breast cancer   Brother - cancer (unsure of type)  Maternal aunt - brain aneurysm     Social History:  She has 3 children- 2 sons (one ), daughter (25)    Retired

## 2024-11-01 ENCOUNTER — OFFICE VISIT (OUTPATIENT)
Age: 64
End: 2024-11-01
Payer: COMMERCIAL

## 2024-11-01 VITALS
SYSTOLIC BLOOD PRESSURE: 136 MMHG | RESPIRATION RATE: 18 BRPM | TEMPERATURE: 98.1 F | DIASTOLIC BLOOD PRESSURE: 75 MMHG | WEIGHT: 158.7 LBS | OXYGEN SATURATION: 93 % | BODY MASS INDEX: 25.61 KG/M2 | HEART RATE: 94 BPM

## 2024-11-01 DIAGNOSIS — K27.9 PUD (PEPTIC ULCER DISEASE): ICD-10-CM

## 2024-11-01 DIAGNOSIS — D50.0 IRON DEFICIENCY ANEMIA DUE TO CHRONIC BLOOD LOSS: ICD-10-CM

## 2024-11-01 DIAGNOSIS — D50.9 IRON DEFICIENCY ANEMIA, UNSPECIFIED IRON DEFICIENCY ANEMIA TYPE: Primary | ICD-10-CM

## 2024-11-01 PROCEDURE — 99214 OFFICE O/P EST MOD 30 MIN: CPT

## 2024-11-01 RX ORDER — ALBUTEROL SULFATE 90 UG/1
4 INHALANT RESPIRATORY (INHALATION) PRN
OUTPATIENT
Start: 2024-11-08

## 2024-11-01 RX ORDER — EPINEPHRINE 1 MG/ML
0.3 INJECTION, SOLUTION, CONCENTRATE INTRAVENOUS PRN
OUTPATIENT
Start: 2024-11-08

## 2024-11-01 RX ORDER — ACETAMINOPHEN 325 MG/1
650 TABLET ORAL
OUTPATIENT
Start: 2024-11-08

## 2024-11-01 RX ORDER — SODIUM CHLORIDE 9 MG/ML
5-250 INJECTION, SOLUTION INTRAVENOUS PRN
OUTPATIENT
Start: 2024-11-08

## 2024-11-01 RX ORDER — ONDANSETRON 2 MG/ML
8 INJECTION INTRAMUSCULAR; INTRAVENOUS
OUTPATIENT
Start: 2024-11-08

## 2024-11-01 RX ORDER — SODIUM CHLORIDE 0.9 % (FLUSH) 0.9 %
5-40 SYRINGE (ML) INJECTION PRN
OUTPATIENT
Start: 2024-11-08

## 2024-11-01 RX ORDER — HEPARIN 100 UNIT/ML
500 SYRINGE INTRAVENOUS PRN
OUTPATIENT
Start: 2024-11-08

## 2024-11-01 RX ORDER — DIPHENHYDRAMINE HYDROCHLORIDE 50 MG/ML
50 INJECTION INTRAMUSCULAR; INTRAVENOUS
OUTPATIENT
Start: 2024-11-08

## 2024-11-01 RX ORDER — SODIUM CHLORIDE 9 MG/ML
INJECTION, SOLUTION INTRAVENOUS CONTINUOUS
OUTPATIENT
Start: 2024-11-08

## 2024-11-01 ASSESSMENT — PATIENT HEALTH QUESTIONNAIRE - PHQ9
SUM OF ALL RESPONSES TO PHQ QUESTIONS 1-9: 0
2. FEELING DOWN, DEPRESSED OR HOPELESS: NOT AT ALL
SUM OF ALL RESPONSES TO PHQ9 QUESTIONS 1 & 2: 0
1. LITTLE INTEREST OR PLEASURE IN DOING THINGS: NOT AT ALL
SUM OF ALL RESPONSES TO PHQ QUESTIONS 1-9: 0

## 2024-11-01 NOTE — PROGRESS NOTES
Kristin Fritz is a 64 y.o. female    Chief Complaint   Patient presents with    New Patient      evaluation of iron deficiency anemia.       1. Have you been to the ER, urgent care clinic since your last visit?  Hospitalized since your last visit?No    2. Have you seen or consulted any other health care providers outside of the [facility] since your last visit?  Include any pap smears or colon screening. Dr. Barrera, cardiology. Dr. Toy Rodriguez, general surgeon, mesh placement due to heartburn.

## 2024-11-03 ENCOUNTER — HOSPITAL ENCOUNTER (EMERGENCY)
Facility: HOSPITAL | Age: 64
Discharge: HOME | End: 2024-11-04
Attending: EMERGENCY MEDICINE
Payer: COMMERCIAL

## 2024-11-03 DIAGNOSIS — R92.8 ABNORMAL FINDING ON BREAST IMAGING: ICD-10-CM

## 2024-11-03 DIAGNOSIS — R51.9 RIGHT-SIDED HEADACHE: Primary | ICD-10-CM

## 2024-11-03 DIAGNOSIS — D64.9 ANEMIA, UNSPECIFIED TYPE: ICD-10-CM

## 2024-11-03 PROCEDURE — 99285 EMERGENCY DEPT VISIT HI MDM: CPT

## 2024-11-03 RX ORDER — PROCHLORPERAZINE EDISYLATE 5 MG/ML
10 INJECTION INTRAMUSCULAR; INTRAVENOUS ONCE
Status: COMPLETED | OUTPATIENT
Start: 2024-11-03 | End: 2024-11-04

## 2024-11-03 RX ORDER — KETOROLAC TROMETHAMINE 30 MG/ML
15 INJECTION, SOLUTION INTRAMUSCULAR; INTRAVENOUS
Status: COMPLETED | OUTPATIENT
Start: 2024-11-03 | End: 2024-11-04

## 2024-11-03 RX ORDER — DIPHENHYDRAMINE HYDROCHLORIDE 50 MG/ML
25 INJECTION INTRAMUSCULAR; INTRAVENOUS
Status: COMPLETED | OUTPATIENT
Start: 2024-11-03 | End: 2024-11-04

## 2024-11-03 ASSESSMENT — PAIN DESCRIPTION - DESCRIPTORS: DESCRIPTORS: ACHING;DISCOMFORT

## 2024-11-03 ASSESSMENT — PAIN SCALES - GENERAL: PAINLEVEL_OUTOF10: 7

## 2024-11-03 ASSESSMENT — PAIN - FUNCTIONAL ASSESSMENT
PAIN_FUNCTIONAL_ASSESSMENT: 0-10
PAIN_FUNCTIONAL_ASSESSMENT: ACTIVITIES ARE NOT PREVENTED

## 2024-11-03 ASSESSMENT — LIFESTYLE VARIABLES
HOW OFTEN DO YOU HAVE A DRINK CONTAINING ALCOHOL: NEVER
HOW MANY STANDARD DRINKS CONTAINING ALCOHOL DO YOU HAVE ON A TYPICAL DAY: PATIENT DOES NOT DRINK

## 2024-11-03 ASSESSMENT — PAIN DESCRIPTION - LOCATION: LOCATION: HEAD

## 2024-11-04 ENCOUNTER — APPOINTMENT (OUTPATIENT)
Facility: HOSPITAL | Age: 64
End: 2024-11-04
Payer: COMMERCIAL

## 2024-11-04 VITALS
SYSTOLIC BLOOD PRESSURE: 100 MMHG | BODY MASS INDEX: 25.65 KG/M2 | DIASTOLIC BLOOD PRESSURE: 58 MMHG | WEIGHT: 159.61 LBS | TEMPERATURE: 98 F | RESPIRATION RATE: 17 BRPM | HEART RATE: 78 BPM | OXYGEN SATURATION: 98 % | HEIGHT: 66 IN

## 2024-11-04 LAB
ALBUMIN SERPL-MCNC: 3.2 G/DL (ref 3.5–5)
ALBUMIN/GLOB SERPL: 0.9 (ref 1.1–2.2)
ALP SERPL-CCNC: 78 U/L (ref 45–117)
ALT SERPL-CCNC: 22 U/L (ref 12–78)
ANION GAP SERPL CALC-SCNC: 6 MMOL/L (ref 2–12)
AST SERPL-CCNC: 12 U/L (ref 15–37)
BASOPHILS # BLD: 0.1 K/UL (ref 0–0.1)
BASOPHILS NFR BLD: 1 % (ref 0–1)
BILIRUB DIRECT SERPL-MCNC: <0.1 MG/DL (ref 0–0.2)
BILIRUB SERPL-MCNC: 0.1 MG/DL (ref 0.2–1)
BUN SERPL-MCNC: 20 MG/DL (ref 6–20)
BUN/CREAT SERPL: 45 (ref 12–20)
CALCIUM SERPL-MCNC: 9.1 MG/DL (ref 8.5–10.1)
CHLORIDE SERPL-SCNC: 108 MMOL/L (ref 97–108)
CO2 SERPL-SCNC: 27 MMOL/L (ref 21–32)
COMMENT:: NORMAL
COMMENT:: NORMAL
CREAT SERPL-MCNC: 0.44 MG/DL (ref 0.55–1.02)
DIFFERENTIAL METHOD BLD: ABNORMAL
EKG ATRIAL RATE: 78 BPM
EKG DIAGNOSIS: NORMAL
EKG P AXIS: 86 DEGREES
EKG P-R INTERVAL: 160 MS
EKG Q-T INTERVAL: 380 MS
EKG QRS DURATION: 72 MS
EKG QTC CALCULATION (BAZETT): 433 MS
EKG R AXIS: 72 DEGREES
EKG T AXIS: 69 DEGREES
EKG VENTRICULAR RATE: 78 BPM
EOSINOPHIL # BLD: 0.2 K/UL (ref 0–0.4)
EOSINOPHIL NFR BLD: 3 % (ref 0–7)
ERYTHROCYTE [DISTWIDTH] IN BLOOD BY AUTOMATED COUNT: 22.1 % (ref 11.5–14.5)
GLOBULIN SER CALC-MCNC: 3.5 G/DL (ref 2–4)
GLUCOSE SERPL-MCNC: 106 MG/DL (ref 65–100)
HCT VFR BLD AUTO: 35.1 % (ref 35–47)
HGB BLD-MCNC: 10 G/DL (ref 11.5–16)
IMM GRANULOCYTES # BLD AUTO: 0.1 K/UL (ref 0–0.04)
IMM GRANULOCYTES NFR BLD AUTO: 1 % (ref 0–0.5)
LIPASE SERPL-CCNC: 46 U/L (ref 13–75)
LYMPHOCYTES # BLD: 1.2 K/UL (ref 0.8–3.5)
LYMPHOCYTES NFR BLD: 21 % (ref 12–49)
MCH RBC QN AUTO: 21.1 PG (ref 26–34)
MCHC RBC AUTO-ENTMCNC: 28.5 G/DL (ref 30–36.5)
MCV RBC AUTO: 73.9 FL (ref 80–99)
MONOCYTES # BLD: 0.6 K/UL (ref 0–1)
MONOCYTES NFR BLD: 10 % (ref 5–13)
NEUTS SEG # BLD: 3.4 K/UL (ref 1.8–8)
NEUTS SEG NFR BLD: 64 % (ref 32–75)
NRBC # BLD: 0 K/UL (ref 0–0.01)
NRBC BLD-RTO: 0 PER 100 WBC
PLATELET # BLD AUTO: 296 K/UL (ref 150–400)
PMV BLD AUTO: 10.5 FL (ref 8.9–12.9)
POTASSIUM SERPL-SCNC: 4 MMOL/L (ref 3.5–5.1)
PROT SERPL-MCNC: 6.7 G/DL (ref 6.4–8.2)
RBC # BLD AUTO: 4.75 M/UL (ref 3.8–5.2)
RBC MORPH BLD: ABNORMAL
SODIUM SERPL-SCNC: 141 MMOL/L (ref 136–145)
SPECIMEN HOLD: NORMAL
SPECIMEN HOLD: NORMAL
WBC # BLD AUTO: 5.6 K/UL (ref 3.6–11)

## 2024-11-04 PROCEDURE — 80076 HEPATIC FUNCTION PANEL: CPT

## 2024-11-04 PROCEDURE — 80048 BASIC METABOLIC PNL TOTAL CA: CPT

## 2024-11-04 PROCEDURE — 6360000004 HC RX CONTRAST MEDICATION: Performed by: RADIOLOGY

## 2024-11-04 PROCEDURE — 70450 CT HEAD/BRAIN W/O DYE: CPT

## 2024-11-04 PROCEDURE — 93005 ELECTROCARDIOGRAM TRACING: CPT | Performed by: EMERGENCY MEDICINE

## 2024-11-04 PROCEDURE — 6360000002 HC RX W HCPCS: Performed by: EMERGENCY MEDICINE

## 2024-11-04 PROCEDURE — 83690 ASSAY OF LIPASE: CPT

## 2024-11-04 PROCEDURE — 96374 THER/PROPH/DIAG INJ IV PUSH: CPT

## 2024-11-04 PROCEDURE — 96375 TX/PRO/DX INJ NEW DRUG ADDON: CPT

## 2024-11-04 PROCEDURE — 36415 COLL VENOUS BLD VENIPUNCTURE: CPT

## 2024-11-04 PROCEDURE — 85025 COMPLETE CBC W/AUTO DIFF WBC: CPT

## 2024-11-04 PROCEDURE — 71260 CT THORAX DX C+: CPT

## 2024-11-04 RX ORDER — IOPAMIDOL 755 MG/ML
100 INJECTION, SOLUTION INTRAVASCULAR
Status: COMPLETED | OUTPATIENT
Start: 2024-11-04 | End: 2024-11-04

## 2024-11-04 RX ORDER — HYDROXYZINE HYDROCHLORIDE 50 MG/1
50 TABLET, FILM COATED ORAL EVERY 8 HOURS PRN
Qty: 30 TABLET | Refills: 0 | Status: SHIPPED | OUTPATIENT
Start: 2024-11-04 | End: 2024-11-14

## 2024-11-04 RX ADMIN — IOPAMIDOL 80 ML: 755 INJECTION, SOLUTION INTRAVENOUS at 02:46

## 2024-11-04 RX ADMIN — DIPHENHYDRAMINE HYDROCHLORIDE 25 MG: 50 INJECTION, SOLUTION INTRAMUSCULAR; INTRAVENOUS at 00:57

## 2024-11-04 RX ADMIN — KETOROLAC TROMETHAMINE 15 MG: 30 INJECTION, SOLUTION INTRAMUSCULAR at 00:57

## 2024-11-04 RX ADMIN — PROCHLORPERAZINE EDISYLATE 10 MG: 5 INJECTION INTRAMUSCULAR; INTRAVENOUS at 00:57

## 2024-11-04 ASSESSMENT — ENCOUNTER SYMPTOMS
ANAL BLEEDING: 0
ABDOMINAL PAIN: 0
COUGH: 0
DIARRHEA: 0
VOMITING: 0
ABDOMINAL DISTENTION: 0
BLOOD IN STOOL: 0
NAUSEA: 1
SHORTNESS OF BREATH: 0

## 2024-11-04 ASSESSMENT — PAIN SCALES - GENERAL: PAINLEVEL_OUTOF10: 5

## 2024-11-04 NOTE — ED TRIAGE NOTES
Pt reports being diagnosed w/ anemia and the \"itching medicine isnt working anymore\"  States she has been taking Zyrtec w/o relief of urticaria.  No other meds hve been tried.  Pt also endorses having a headache x 2 days that \"isn't my normal migraine that I normally take Imitrex for\".  Pt reports she is to start receiving bliid transfusions for her anemia- and last Hbg was \"8-ronal\"

## 2024-11-04 NOTE — DISCHARGE INSTRUCTIONS
Your CT scans were unremarkable except incidentally showed abnormal breast tissue in your left breast. This should be followed up with a mammogram or ultrasound. Discuss this result and further evaluation with your primary care doctor.

## 2024-11-04 NOTE — ED PROVIDER NOTES
2-3days.    Amount and/or Complexity of Data Reviewed  Labs: ordered.  Radiology: ordered.  ECG/medicine tests: ordered.    Risk  Prescription drug management.            ED Course            CONSULTS:  None    PROCEDURES:  Unless otherwise noted below, none     Procedures      FINAL IMPRESSION      1. Right-sided headache    2. Anemia, unspecified type    3. Abnormal finding on breast imaging          DISPOSITION/PLAN   DISPOSITION Decision To Discharge 11/04/2024 03:13:37 AM      PATIENT REFERRED TO:  Katherine Syed DO  [address] [address]  [phone]    Schedule an appointment as soon as possible for a visit in 3 days        DISCHARGE MEDICATIONS:  Discharge Medication List as of 11/4/2024  3:14 AM            Eddie Gauthier MD (electronically signed)  Emergency Attending Physician        Eddie Gauthier MD  11/05/24 0255

## 2024-11-06 ENCOUNTER — TELEPHONE (OUTPATIENT)
Age: 64
End: 2024-11-06

## 2024-11-11 ENCOUNTER — HOSPITAL ENCOUNTER (OUTPATIENT)
Facility: HOSPITAL | Age: 64
Setting detail: INFUSION SERIES
Discharge: HOME | End: 2024-11-11
Payer: COMMERCIAL

## 2024-11-11 VITALS
TEMPERATURE: 98 F | HEART RATE: 83 BPM | RESPIRATION RATE: 18 BRPM | OXYGEN SATURATION: 96 % | SYSTOLIC BLOOD PRESSURE: 118 MMHG | DIASTOLIC BLOOD PRESSURE: 74 MMHG

## 2024-11-11 DIAGNOSIS — K27.9 PUD (PEPTIC ULCER DISEASE): ICD-10-CM

## 2024-11-11 DIAGNOSIS — D50.0 IRON DEFICIENCY ANEMIA DUE TO CHRONIC BLOOD LOSS: Primary | ICD-10-CM

## 2024-11-11 PROCEDURE — 96374 THER/PROPH/DIAG INJ IV PUSH: CPT

## 2024-11-11 PROCEDURE — 6360000002 HC RX W HCPCS

## 2024-11-11 RX ORDER — ALBUTEROL SULFATE 90 UG/1
4 INHALANT RESPIRATORY (INHALATION) PRN
Status: CANCELLED | OUTPATIENT
Start: 2024-11-14

## 2024-11-11 RX ORDER — SODIUM CHLORIDE 9 MG/ML
5-250 INJECTION, SOLUTION INTRAVENOUS PRN
Status: CANCELLED | OUTPATIENT
Start: 2024-11-14

## 2024-11-11 RX ORDER — HYDROCORTISONE SODIUM SUCCINATE 100 MG/2ML
100 INJECTION INTRAMUSCULAR; INTRAVENOUS
Status: CANCELLED | OUTPATIENT
Start: 2024-11-14

## 2024-11-11 RX ORDER — ACETAMINOPHEN 325 MG/1
650 TABLET ORAL
Status: CANCELLED | OUTPATIENT
Start: 2024-11-14

## 2024-11-11 RX ORDER — DIPHENHYDRAMINE HYDROCHLORIDE 50 MG/ML
50 INJECTION INTRAMUSCULAR; INTRAVENOUS
Status: CANCELLED | OUTPATIENT
Start: 2024-11-14

## 2024-11-11 RX ORDER — SODIUM CHLORIDE 9 MG/ML
5-250 INJECTION, SOLUTION INTRAVENOUS PRN
Status: DISCONTINUED | OUTPATIENT
Start: 2024-11-11 | End: 2024-11-12 | Stop reason: HOSPADM

## 2024-11-11 RX ORDER — SODIUM CHLORIDE 9 MG/ML
INJECTION, SOLUTION INTRAVENOUS CONTINUOUS
Status: CANCELLED | OUTPATIENT
Start: 2024-11-14

## 2024-11-11 RX ORDER — EPINEPHRINE 1 MG/ML
0.3 INJECTION, SOLUTION INTRAMUSCULAR; SUBCUTANEOUS PRN
Status: CANCELLED | OUTPATIENT
Start: 2024-11-14

## 2024-11-11 RX ORDER — ONDANSETRON 2 MG/ML
8 INJECTION INTRAMUSCULAR; INTRAVENOUS
Status: CANCELLED | OUTPATIENT
Start: 2024-11-14

## 2024-11-11 RX ORDER — SODIUM CHLORIDE 0.9 % (FLUSH) 0.9 %
5-40 SYRINGE (ML) INJECTION PRN
Status: CANCELLED | OUTPATIENT
Start: 2024-11-14

## 2024-11-11 RX ORDER — HEPARIN 100 UNIT/ML
500 SYRINGE INTRAVENOUS PRN
Status: CANCELLED | OUTPATIENT
Start: 2024-11-14

## 2024-11-11 RX ADMIN — IRON SUCROSE 200 MG: 20 INJECTION, SOLUTION INTRAVENOUS at 16:26

## 2024-11-11 ASSESSMENT — PAIN DESCRIPTION - LOCATION: LOCATION: GENERALIZED

## 2024-11-11 ASSESSMENT — PAIN SCALES - GENERAL: PAINLEVEL_OUTOF10: 5

## 2024-11-11 NOTE — PLAN OF CARE
[facility] Progress Note    Date: 2024    Name: Kristin Fritz    MRN: [id number]         : 1960    Ms. Fritz Arrived ambulatory and in no distress for Venofer #1.      Assessment was completed and documented in flowsheets. 24G IV placed without difficulty, positive blood return noted. Patient reports ongoing generalized tingling and itching sensation on skin. Presented to ED 24. Patient advised to f/u with PCP. OK to treat per SVITLANA Mattson NP.    Ms. Fritz's vital signs for this visit taken prior to treatment and after 30 minute observation.  Patient Vitals for the past 24 hrs:   BP Temp Temp src Pulse Resp SpO2   24 1657 118/74 -- -- 83 -- --   24 1557 115/70 98 °F (36.7 °C) Temporal 85 18 96 %       Medications given:   Medications Administered         iron sucrose (VENOFER) injection 200 mg Admin Date  2024 Action  Given Dose  200 mg Route  IntraVENous Documented By  Arti Martinez, RN              Ms. Fritz tolerated the infusion, and had no complaints.    PIV flushed and removed after completion of infusion. 2x2 and coban placed.     [facility] and is aware of future appointments.     Future Appointments   Date Time Provider Department Center   2024  3:00 PM YE FASTTRACK 3 [facility]   2024  3:00 PM YE FASTTRACK 3 [facility]   12/3/2024  3:00 PM YE FASTTRACK 3 [facility]   12/10/2024  2:00 PM YE FASTTRACK 1 [facility]   2025 11:20 AM Pamela Alcaraz MD [facility]   2/3/2025 11:00 AM Sara Hill MD [facility] BS AMB       ARTI MARTINEZ, RN  2024  4:51 PM    Problem: Pain  Goal: Verbalizes/displays adequate comfort level or baseline comfort level  Outcome: Progressing     Problem: Safety - Adult  Goal: Free from fall injury  Outcome: Progressing

## 2024-11-19 ENCOUNTER — HOSPITAL ENCOUNTER (OUTPATIENT)
Facility: HOSPITAL | Age: 64
Setting detail: INFUSION SERIES
Discharge: HOME | End: 2024-11-19
Payer: COMMERCIAL

## 2024-11-19 VITALS
TEMPERATURE: 97.8 F | RESPIRATION RATE: 18 BRPM | DIASTOLIC BLOOD PRESSURE: 66 MMHG | SYSTOLIC BLOOD PRESSURE: 114 MMHG | HEART RATE: 72 BPM

## 2024-11-19 DIAGNOSIS — D50.0 IRON DEFICIENCY ANEMIA DUE TO CHRONIC BLOOD LOSS: Primary | ICD-10-CM

## 2024-11-19 DIAGNOSIS — K27.9 PUD (PEPTIC ULCER DISEASE): ICD-10-CM

## 2024-11-19 PROCEDURE — 96374 THER/PROPH/DIAG INJ IV PUSH: CPT

## 2024-11-19 PROCEDURE — 6360000002 HC RX W HCPCS

## 2024-11-19 RX ORDER — ALBUTEROL SULFATE 90 UG/1
4 INHALANT RESPIRATORY (INHALATION) PRN
OUTPATIENT
Start: 2024-11-22

## 2024-11-19 RX ORDER — SODIUM CHLORIDE 9 MG/ML
5-250 INJECTION, SOLUTION INTRAVENOUS PRN
OUTPATIENT
Start: 2024-11-22

## 2024-11-19 RX ORDER — ONDANSETRON 2 MG/ML
8 INJECTION INTRAMUSCULAR; INTRAVENOUS
OUTPATIENT
Start: 2024-11-22

## 2024-11-19 RX ORDER — ACETAMINOPHEN 325 MG/1
650 TABLET ORAL
OUTPATIENT
Start: 2024-11-22

## 2024-11-19 RX ORDER — HYDROCORTISONE SODIUM SUCCINATE 100 MG/2ML
100 INJECTION INTRAMUSCULAR; INTRAVENOUS
OUTPATIENT
Start: 2024-11-22

## 2024-11-19 RX ORDER — SODIUM CHLORIDE 9 MG/ML
INJECTION, SOLUTION INTRAVENOUS CONTINUOUS
OUTPATIENT
Start: 2024-11-22

## 2024-11-19 RX ORDER — DIPHENHYDRAMINE HYDROCHLORIDE 50 MG/ML
50 INJECTION INTRAMUSCULAR; INTRAVENOUS
OUTPATIENT
Start: 2024-11-22

## 2024-11-19 RX ORDER — SODIUM CHLORIDE 0.9 % (FLUSH) 0.9 %
5-40 SYRINGE (ML) INJECTION PRN
OUTPATIENT
Start: 2024-11-22

## 2024-11-19 RX ORDER — EPINEPHRINE 1 MG/ML
0.3 INJECTION, SOLUTION INTRAMUSCULAR; SUBCUTANEOUS PRN
OUTPATIENT
Start: 2024-11-22

## 2024-11-19 RX ORDER — HEPARIN 100 UNIT/ML
500 SYRINGE INTRAVENOUS PRN
OUTPATIENT
Start: 2024-11-22

## 2024-11-19 RX ADMIN — IRON SUCROSE 200 MG: 20 INJECTION, SOLUTION INTRAVENOUS at 15:09

## 2024-11-19 ASSESSMENT — PAIN SCALES - GENERAL: PAINLEVEL_OUTOF10: 0

## 2024-11-19 NOTE — PROGRESS NOTES
Pt arrived to [facility] for Venofer 2/5 in stable condition.  PIV established to right ac  with positive blood return.         Vitals:    11/19/24 1537   BP: 114/66   Pulse: 72   Resp:    Temp:      Medications Administered         iron sucrose (VENOFER) injection 200 mg Admin Date  11/19/2024 Action  Given Dose  200 mg Route  IntraVENous Documented By  Yudi Avalos, RN        Pt observed 15 min post infusion w/o incident.       PIV flushed and removed per protocol. Pt tolerated treatment well. D/Cd from [facility] in no distress.  Future Appointments   Date Time Provider Department Center   11/26/2024  3:00 PM YE FASTTRACK 3 [facility]   12/3/2024  3:00 PM YE FASTTRACK 3 [facility]   12/10/2024  2:00 PM YE FASTTRACK 1 [facility]   1/16/2025 11:20 AM Pamela Alcaraz MD [facility]   2/3/2025 11:00 AM Sara Hill MD [facility] BS AMB

## 2024-11-26 ENCOUNTER — HOSPITAL ENCOUNTER (OUTPATIENT)
Facility: HOSPITAL | Age: 64
Setting detail: INFUSION SERIES
Discharge: HOME | End: 2024-11-26
Payer: COMMERCIAL

## 2024-11-26 VITALS
TEMPERATURE: 97.2 F | SYSTOLIC BLOOD PRESSURE: 118 MMHG | DIASTOLIC BLOOD PRESSURE: 68 MMHG | RESPIRATION RATE: 18 BRPM | HEART RATE: 96 BPM

## 2024-11-26 DIAGNOSIS — K27.9 PUD (PEPTIC ULCER DISEASE): ICD-10-CM

## 2024-11-26 DIAGNOSIS — D50.0 IRON DEFICIENCY ANEMIA DUE TO CHRONIC BLOOD LOSS: Primary | ICD-10-CM

## 2024-11-26 PROCEDURE — 6360000002 HC RX W HCPCS

## 2024-11-26 PROCEDURE — 96374 THER/PROPH/DIAG INJ IV PUSH: CPT

## 2024-11-26 RX ORDER — ONDANSETRON 2 MG/ML
8 INJECTION INTRAMUSCULAR; INTRAVENOUS
Status: CANCELLED | OUTPATIENT
Start: 2024-11-29

## 2024-11-26 RX ORDER — ALBUTEROL SULFATE 90 UG/1
4 INHALANT RESPIRATORY (INHALATION) PRN
Status: CANCELLED | OUTPATIENT
Start: 2024-11-29

## 2024-11-26 RX ORDER — SODIUM CHLORIDE 9 MG/ML
5-250 INJECTION, SOLUTION INTRAVENOUS PRN
Status: CANCELLED | OUTPATIENT
Start: 2024-11-29

## 2024-11-26 RX ORDER — ACETAMINOPHEN 325 MG/1
650 TABLET ORAL
Status: CANCELLED | OUTPATIENT
Start: 2024-11-29

## 2024-11-26 RX ORDER — HYDROCORTISONE SODIUM SUCCINATE 100 MG/2ML
100 INJECTION INTRAMUSCULAR; INTRAVENOUS
Status: CANCELLED | OUTPATIENT
Start: 2024-11-29

## 2024-11-26 RX ORDER — SODIUM CHLORIDE 0.9 % (FLUSH) 0.9 %
5-40 SYRINGE (ML) INJECTION PRN
Status: CANCELLED | OUTPATIENT
Start: 2024-11-29

## 2024-11-26 RX ORDER — HEPARIN 100 UNIT/ML
500 SYRINGE INTRAVENOUS PRN
Status: CANCELLED | OUTPATIENT
Start: 2024-11-29

## 2024-11-26 RX ORDER — SODIUM CHLORIDE 9 MG/ML
INJECTION, SOLUTION INTRAVENOUS CONTINUOUS
Status: CANCELLED | OUTPATIENT
Start: 2024-11-29

## 2024-11-26 RX ORDER — SODIUM CHLORIDE 9 MG/ML
5-250 INJECTION, SOLUTION INTRAVENOUS PRN
Status: DISCONTINUED | OUTPATIENT
Start: 2024-11-26 | End: 2024-11-27 | Stop reason: HOSPADM

## 2024-11-26 RX ORDER — EPINEPHRINE 1 MG/ML
0.3 INJECTION, SOLUTION INTRAMUSCULAR; SUBCUTANEOUS PRN
Status: CANCELLED | OUTPATIENT
Start: 2024-11-29

## 2024-11-26 RX ORDER — DIPHENHYDRAMINE HYDROCHLORIDE 50 MG/ML
50 INJECTION INTRAMUSCULAR; INTRAVENOUS
Status: CANCELLED | OUTPATIENT
Start: 2024-11-29

## 2024-11-26 RX ADMIN — IRON SUCROSE 200 MG: 20 INJECTION, SOLUTION INTRAVENOUS at 15:14

## 2024-11-26 NOTE — PROGRESS NOTES
[facility] Short Note                       Date: 2024    Name: Kristin Fritz    MRN: [id number]         : 1960    1500 Pt admit to [facility] for Venofer IVP dose 3 of 5 ambulatory in stable condition. Assessment completed. No new concerns voiced.    Ms. Fritz's vitals were reviewed prior to treatment.   Patient Vitals for the past 12 hrs:   Temp Pulse Resp BP   24 1500 97.2 °F (36.2 °C) 96 18 118/68       PIV (right FA) with positive blood return.  flushed, and de-accessed per protocol.       Medications given:   Medications Administered         iron sucrose (VENOFER) injection 200 mg Admin Date  2024 Action  Given Dose  200 mg Route  IntraVENous Documented By  Amauri Onofre, RN           Patient waited 15 minutes with no reaction    1545 Pt tolerated treatment well. D/c home ambulatory in no distress. Pt aware of next appointment scheduled for 12/3/24.    Future Appointments   Date Time Provider Department Center   12/3/2024  3:00 [facility]   12/10/2024  2:00 PM YE FASTTRACK 1 [facility]   2025 11:20 AM Pamela Alcaraz MD [facility]   2/3/2025 11:00 AM Sara Hill MD [facility] BS AMB       AMAURI ONOFRE, ARNIE  2024  3:44 PM

## 2024-12-03 ENCOUNTER — HOSPITAL ENCOUNTER (OUTPATIENT)
Facility: HOSPITAL | Age: 64
Setting detail: INFUSION SERIES
Discharge: HOME | End: 2024-12-03
Payer: COMMERCIAL

## 2024-12-03 VITALS
HEART RATE: 82 BPM | RESPIRATION RATE: 18 BRPM | DIASTOLIC BLOOD PRESSURE: 60 MMHG | SYSTOLIC BLOOD PRESSURE: 125 MMHG | TEMPERATURE: 97.8 F

## 2024-12-03 DIAGNOSIS — K27.9 PUD (PEPTIC ULCER DISEASE): Primary | ICD-10-CM

## 2024-12-03 DIAGNOSIS — D50.0 IRON DEFICIENCY ANEMIA DUE TO CHRONIC BLOOD LOSS: ICD-10-CM

## 2024-12-03 LAB
HCT VFR BLD AUTO: 42.8 % (ref 35–47)
HGB BLD-MCNC: 12.6 G/DL (ref 11.5–16)

## 2024-12-03 PROCEDURE — 85014 HEMATOCRIT: CPT

## 2024-12-03 PROCEDURE — 85018 HEMOGLOBIN: CPT

## 2024-12-03 PROCEDURE — 36415 COLL VENOUS BLD VENIPUNCTURE: CPT

## 2024-12-03 PROCEDURE — 6360000002 HC RX W HCPCS

## 2024-12-03 PROCEDURE — 96374 THER/PROPH/DIAG INJ IV PUSH: CPT

## 2024-12-03 RX ORDER — SODIUM CHLORIDE 9 MG/ML
5-250 INJECTION, SOLUTION INTRAVENOUS PRN
Status: DISCONTINUED | OUTPATIENT
Start: 2024-12-03 | End: 2024-12-04 | Stop reason: HOSPADM

## 2024-12-03 RX ORDER — ACETAMINOPHEN 325 MG/1
650 TABLET ORAL
Status: CANCELLED | OUTPATIENT
Start: 2024-12-06

## 2024-12-03 RX ORDER — ONDANSETRON 2 MG/ML
8 INJECTION INTRAMUSCULAR; INTRAVENOUS
Status: CANCELLED | OUTPATIENT
Start: 2024-12-06

## 2024-12-03 RX ORDER — SODIUM CHLORIDE 9 MG/ML
INJECTION, SOLUTION INTRAVENOUS CONTINUOUS
Status: CANCELLED | OUTPATIENT
Start: 2024-12-06

## 2024-12-03 RX ORDER — HYDROCORTISONE SODIUM SUCCINATE 100 MG/2ML
100 INJECTION INTRAMUSCULAR; INTRAVENOUS
Status: CANCELLED | OUTPATIENT
Start: 2024-12-06

## 2024-12-03 RX ORDER — SODIUM CHLORIDE 9 MG/ML
5-250 INJECTION, SOLUTION INTRAVENOUS PRN
Status: CANCELLED | OUTPATIENT
Start: 2024-12-06

## 2024-12-03 RX ORDER — HEPARIN 100 UNIT/ML
500 SYRINGE INTRAVENOUS PRN
Status: CANCELLED | OUTPATIENT
Start: 2024-12-06

## 2024-12-03 RX ORDER — DIPHENHYDRAMINE HYDROCHLORIDE 50 MG/ML
50 INJECTION INTRAMUSCULAR; INTRAVENOUS
Status: CANCELLED | OUTPATIENT
Start: 2024-12-06

## 2024-12-03 RX ORDER — EPINEPHRINE 1 MG/ML
0.3 INJECTION, SOLUTION INTRAMUSCULAR; SUBCUTANEOUS PRN
Status: CANCELLED | OUTPATIENT
Start: 2024-12-06

## 2024-12-03 RX ORDER — SODIUM CHLORIDE 0.9 % (FLUSH) 0.9 %
5-40 SYRINGE (ML) INJECTION PRN
Status: CANCELLED | OUTPATIENT
Start: 2024-12-06

## 2024-12-03 RX ORDER — ALBUTEROL SULFATE 90 UG/1
4 INHALANT RESPIRATORY (INHALATION) PRN
Status: CANCELLED | OUTPATIENT
Start: 2024-12-06

## 2024-12-03 RX ADMIN — IRON SUCROSE 200 MG: 20 INJECTION, SOLUTION INTRAVENOUS at 15:05

## 2024-12-03 NOTE — PROGRESS NOTES
[facility] Short Note                       Date: December 3, 2024    Name: Kristin Fritz    MRN: [id number]         : 1960    1500 Pt admit to [facility] for Number 4 of Venofer IVP ambulatory in stable condition. Assessment completed. No new concerns voiced.  Please review pending lab results in CC.    Ms. Fritz's vitals were reviewed prior to treatment.   Patient Vitals for the past 12 hrs:   Temp Pulse Resp BP   24 1445 97.8 °F (36.6 °C) 84 18 (!) 124/48       PIV (right FA) with positive blood return. Lab drawn, flushed, and de-accessed per protocol.       Medications given:   Medications Administered         iron sucrose (VENOFER) injection 200 mg Admin Date  2024 Action  Given Dose  200 mg Route  IntraVENous Documented By  Jami Onofre, RN               1535 Pt tolerated treatment well. D/c home ambulatory in no distress. Pt aware of next appointment scheduled for 12/10/24.    Future Appointments   Date Time Provider Department Center   12/10/2024  2:00 PM YE FASTTRACK 1 [facility]   2025 11:20 AM Pamela Alcaraz MD [facility]   2/3/2025 11:00 AM Sara Hill MD [facility] BS LINCOLN ONOFRE, ARNIE  December 3, 2024  3:16 PM

## 2024-12-10 ENCOUNTER — HOSPITAL ENCOUNTER (OUTPATIENT)
Facility: HOSPITAL | Age: 64
Setting detail: INFUSION SERIES
Discharge: HOME | End: 2024-12-10
Payer: COMMERCIAL

## 2024-12-10 VITALS
TEMPERATURE: 97.3 F | DIASTOLIC BLOOD PRESSURE: 72 MMHG | HEART RATE: 93 BPM | SYSTOLIC BLOOD PRESSURE: 128 MMHG | RESPIRATION RATE: 18 BRPM

## 2024-12-10 DIAGNOSIS — K27.9 PUD (PEPTIC ULCER DISEASE): ICD-10-CM

## 2024-12-10 DIAGNOSIS — D50.0 IRON DEFICIENCY ANEMIA DUE TO CHRONIC BLOOD LOSS: Primary | ICD-10-CM

## 2024-12-10 PROCEDURE — 6360000002 HC RX W HCPCS

## 2024-12-10 PROCEDURE — 96365 THER/PROPH/DIAG IV INF INIT: CPT

## 2024-12-10 RX ORDER — ALBUTEROL SULFATE 90 UG/1
4 INHALANT RESPIRATORY (INHALATION) PRN
OUTPATIENT
Start: 2024-12-12

## 2024-12-10 RX ORDER — HEPARIN 100 UNIT/ML
500 SYRINGE INTRAVENOUS PRN
OUTPATIENT
Start: 2024-12-12

## 2024-12-10 RX ORDER — SODIUM CHLORIDE 9 MG/ML
INJECTION, SOLUTION INTRAVENOUS CONTINUOUS
OUTPATIENT
Start: 2024-12-12

## 2024-12-10 RX ORDER — SODIUM CHLORIDE 9 MG/ML
5-250 INJECTION, SOLUTION INTRAVENOUS PRN
OUTPATIENT
Start: 2024-12-12

## 2024-12-10 RX ORDER — DIPHENHYDRAMINE HYDROCHLORIDE 50 MG/ML
50 INJECTION INTRAMUSCULAR; INTRAVENOUS
OUTPATIENT
Start: 2024-12-12

## 2024-12-10 RX ORDER — ONDANSETRON 2 MG/ML
8 INJECTION INTRAMUSCULAR; INTRAVENOUS
OUTPATIENT
Start: 2024-12-12

## 2024-12-10 RX ORDER — SODIUM CHLORIDE 0.9 % (FLUSH) 0.9 %
5-40 SYRINGE (ML) INJECTION PRN
OUTPATIENT
Start: 2024-12-12

## 2024-12-10 RX ORDER — HYDROCORTISONE SODIUM SUCCINATE 100 MG/2ML
100 INJECTION INTRAMUSCULAR; INTRAVENOUS
OUTPATIENT
Start: 2024-12-12

## 2024-12-10 RX ORDER — SODIUM CHLORIDE 9 MG/ML
5-250 INJECTION, SOLUTION INTRAVENOUS PRN
Status: DISCONTINUED | OUTPATIENT
Start: 2024-12-10 | End: 2024-12-11 | Stop reason: HOSPADM

## 2024-12-10 RX ORDER — EPINEPHRINE 1 MG/ML
0.3 INJECTION, SOLUTION INTRAMUSCULAR; SUBCUTANEOUS PRN
OUTPATIENT
Start: 2024-12-12

## 2024-12-10 RX ORDER — ACETAMINOPHEN 325 MG/1
650 TABLET ORAL
OUTPATIENT
Start: 2024-12-12

## 2024-12-10 RX ADMIN — IRON SUCROSE 200 MG: 20 INJECTION, SOLUTION INTRAVENOUS at 14:03

## 2024-12-10 ASSESSMENT — PAIN SCALES - GENERAL: PAINLEVEL_OUTOF10: 0

## 2024-12-10 NOTE — PROGRESS NOTES
[facility] Short Note                       Date: December 10, 2024    Name: Kristin Fritz    MRN: [id number]         : 1960      2:00 Pt admit to [facility] for VENOFER ambulatory in stable condition. Assessment completed. No new concerns voiced.      Ms. Fritz's vitals were reviewed prior to treatment.   Patient Vitals for the past 12 hrs:   Temp Pulse Resp BP   12/10/24 1355 97.3 °F (36.3 °C) 93 18 128/72         Lab results were obtained and sent for processing.       Medications given: PIV #24 right forearm  Medications Administered         iron sucrose (VENOFER) injection 200 mg Admin Date  12/10/2024 Action  Given Dose  200 mg Route  IntraVENous Documented By  Anjali Vegas, RN              Ms. Fritz tolerated the infusion, and had no complaints.    [facility].     Future Appointments   Date Time Provider Department Center   2025 11:20 AM Pamela Alcaraz MD [facility]   2/3/2025 11:00 AM Sara Hill MD [facility] BS AMB       Anjali Vegas RN  December 10, 2024  3:46 PM

## 2025-01-16 ENCOUNTER — OFFICE VISIT (OUTPATIENT)
Age: 65
End: 2025-01-16
Payer: COMMERCIAL

## 2025-01-16 VITALS
RESPIRATION RATE: 18 BRPM | DIASTOLIC BLOOD PRESSURE: 72 MMHG | WEIGHT: 160 LBS | HEART RATE: 70 BPM | SYSTOLIC BLOOD PRESSURE: 138 MMHG | OXYGEN SATURATION: 97 % | HEIGHT: 66 IN | BODY MASS INDEX: 25.71 KG/M2

## 2025-01-16 DIAGNOSIS — Z12.31 SCREENING MAMMOGRAM, ENCOUNTER FOR: ICD-10-CM

## 2025-01-16 DIAGNOSIS — R06.02 SHORTNESS OF BREATH: ICD-10-CM

## 2025-01-16 DIAGNOSIS — R93.89 ABNORMAL CHEST CT: Primary | ICD-10-CM

## 2025-01-16 DIAGNOSIS — I10 HYPERTENSION, UNSPECIFIED TYPE: ICD-10-CM

## 2025-01-16 PROCEDURE — 3075F SYST BP GE 130 - 139MM HG: CPT | Performed by: INTERNAL MEDICINE

## 2025-01-16 PROCEDURE — 3078F DIAST BP <80 MM HG: CPT | Performed by: INTERNAL MEDICINE

## 2025-01-16 PROCEDURE — 99214 OFFICE O/P EST MOD 30 MIN: CPT | Performed by: INTERNAL MEDICINE

## 2025-01-16 ASSESSMENT — PATIENT HEALTH QUESTIONNAIRE - PHQ9
SUM OF ALL RESPONSES TO PHQ QUESTIONS 1-9: 0
2. FEELING DOWN, DEPRESSED OR HOPELESS: NOT AT ALL
SUM OF ALL RESPONSES TO PHQ9 QUESTIONS 1 & 2: 0
SUM OF ALL RESPONSES TO PHQ QUESTIONS 1-9: 0
1. LITTLE INTEREST OR PLEASURE IN DOING THINGS: NOT AT ALL

## 2025-01-31 ENCOUNTER — TELEPHONE (OUTPATIENT)
Age: 65
End: 2025-01-31

## 2025-02-03 DIAGNOSIS — D50.9 IRON DEFICIENCY ANEMIA, UNSPECIFIED IRON DEFICIENCY ANEMIA TYPE: ICD-10-CM

## 2025-02-04 ENCOUNTER — APPOINTMENT (OUTPATIENT)
Facility: HOSPITAL | Age: 65
End: 2025-02-04
Payer: COMMERCIAL

## 2025-02-04 ENCOUNTER — HOSPITAL ENCOUNTER (EMERGENCY)
Facility: HOSPITAL | Age: 65
Discharge: HOME | End: 2025-02-04
Attending: EMERGENCY MEDICINE
Payer: COMMERCIAL

## 2025-02-04 VITALS
DIASTOLIC BLOOD PRESSURE: 74 MMHG | SYSTOLIC BLOOD PRESSURE: 128 MMHG | RESPIRATION RATE: 18 BRPM | WEIGHT: 161.6 LBS | HEIGHT: 66 IN | OXYGEN SATURATION: 99 % | BODY MASS INDEX: 25.97 KG/M2 | HEART RATE: 80 BPM | TEMPERATURE: 98.7 F

## 2025-02-04 DIAGNOSIS — S82.831A CLOSED FRACTURE OF PROXIMAL END OF RIGHT FIBULA, UNSPECIFIED FRACTURE MORPHOLOGY, INITIAL ENCOUNTER: Primary | ICD-10-CM

## 2025-02-04 LAB
BASOPHILS # BLD AUTO: 0 X10E3/UL (ref 0–0.2)
BASOPHILS NFR BLD AUTO: 1 %
EOSINOPHIL # BLD AUTO: 0.1 X10E3/UL (ref 0–0.4)
EOSINOPHIL NFR BLD AUTO: 3 %
ERYTHROCYTE [DISTWIDTH] IN BLOOD BY AUTOMATED COUNT: 16.3 % (ref 11.7–15.4)
FERRITIN SERPL-MCNC: 135 NG/ML (ref 15–150)
HCT VFR BLD AUTO: 44.3 % (ref 34–46.6)
HGB BLD-MCNC: 13.5 G/DL (ref 11.1–15.9)
IMM GRANULOCYTES # BLD AUTO: 0 X10E3/UL (ref 0–0.1)
IMM GRANULOCYTES NFR BLD AUTO: 0 %
IRON SATN MFR SERPL: 21 % (ref 15–55)
IRON SERPL-MCNC: 66 UG/DL (ref 27–139)
LYMPHOCYTES # BLD AUTO: 0.8 X10E3/UL (ref 0.7–3.1)
LYMPHOCYTES NFR BLD AUTO: 23 %
MCH RBC QN AUTO: 26.2 PG (ref 26.6–33)
MCHC RBC AUTO-ENTMCNC: 30.5 G/DL (ref 31.5–35.7)
MCV RBC AUTO: 86 FL (ref 79–97)
MONOCYTES # BLD AUTO: 0.3 X10E3/UL (ref 0.1–0.9)
MONOCYTES NFR BLD AUTO: 9 %
NEUTROPHILS # BLD AUTO: 2.4 X10E3/UL (ref 1.4–7)
NEUTROPHILS NFR BLD AUTO: 64 %
PLATELET # BLD AUTO: 229 X10E3/UL (ref 150–450)
RBC # BLD AUTO: 5.15 X10E6/UL (ref 3.77–5.28)
TIBC SERPL-MCNC: 307 UG/DL (ref 250–450)
UIBC SERPL-MCNC: 241 UG/DL (ref 118–369)
WBC # BLD AUTO: 3.6 X10E3/UL (ref 3.4–10.8)

## 2025-02-04 PROCEDURE — 99283 EMERGENCY DEPT VISIT LOW MDM: CPT

## 2025-02-04 PROCEDURE — 73590 X-RAY EXAM OF LOWER LEG: CPT

## 2025-02-04 PROCEDURE — 6370000000 HC RX 637 (ALT 250 FOR IP): Performed by: PHYSICIAN ASSISTANT

## 2025-02-04 RX ORDER — ACETAMINOPHEN 500 MG
1000 TABLET ORAL
Status: COMPLETED | OUTPATIENT
Start: 2025-02-04 | End: 2025-02-04

## 2025-02-04 RX ADMIN — ACETAMINOPHEN 1000 MG: 500 TABLET ORAL at 13:19

## 2025-02-04 ASSESSMENT — ENCOUNTER SYMPTOMS
SORE THROAT: 0
ABDOMINAL PAIN: 0
COUGH: 0
VOMITING: 0
NAUSEA: 0
DIARRHEA: 0
SHORTNESS OF BREATH: 0
RHINORRHEA: 0

## 2025-02-04 ASSESSMENT — PAIN SCALES - GENERAL
PAINLEVEL_OUTOF10: 8
PAINLEVEL_OUTOF10: 6
PAINLEVEL_OUTOF10: 7

## 2025-02-04 ASSESSMENT — PAIN DESCRIPTION - DESCRIPTORS
DESCRIPTORS: ACHING
DESCRIPTORS: OTHER (COMMENT)

## 2025-02-04 ASSESSMENT — PAIN DESCRIPTION - LOCATION
LOCATION: LEG

## 2025-02-04 ASSESSMENT — PAIN DESCRIPTION - ORIENTATION
ORIENTATION: RIGHT

## 2025-02-04 ASSESSMENT — PAIN - FUNCTIONAL ASSESSMENT: PAIN_FUNCTIONAL_ASSESSMENT: 0-10

## 2025-02-04 NOTE — DISCHARGE INSTRUCTIONS
Your xray shows acute fracture proximal fibula. Tylenol as needed for pain. Weight bearing as tolerated. Call Dr. Timmons's office to schedule a follow up appointment. Return to the ED for new or worsening symptoms.

## 2025-02-04 NOTE — ED TRIAGE NOTES
ED triage note: Patient reports 1 hour ago was carrying an item and missed the bottom step. Patient reports pain to right leg. Denies hitting head. Denies blood thinners.

## 2025-02-04 NOTE — ED PROVIDER NOTES
[facility]  EMERGENCY DEPARTMENT ENCOUNTER      Pt Name: Kristin Fritz  MRN: [id number]  Birthdate 1960  Date of evaluation: 2/4/2025  Provider: Yudi Recinos PA-C    CHIEF COMPLAINT       Chief Complaint   Patient presents with    Fall    Leg Pain                HISTORY OF PRESENT ILLNESS   (Location/Symptom, Timing/Onset, Context/Setting, Quality, Duration, Modifying Factors, Severity)  Note limiting factors.   Patient is a 64-year-old female with past medical history significant for lupus and high blood pressure who presents to the emergency department for right lower leg pain status post falling onto brick ground after missing one stair step around 9:45 am. Pt denies hitting her head or LOC. Pt denies taking blood thinners. Pt reports applying ice immediately. Pt denies taking any pain medications. Pt states she is unable to bear weight at this time. Pt denies any numbness/tingling, hip pain, knee pain, ankle pain, back pain.            Review of External Medical Records:     Nursing Notes were reviewed.    REVIEW OF SYSTEMS    (2-9 systems for level 4, 10 or more for level 5)     Review of Systems   Constitutional:  Negative for chills and fever.   HENT:  Negative for congestion, rhinorrhea and sore throat.    Respiratory:  Negative for cough and shortness of breath.    Cardiovascular:  Negative for chest pain.   Gastrointestinal:  Negative for abdominal pain, diarrhea, nausea and vomiting.   Genitourinary:  Negative for dysuria, frequency and hematuria.   Musculoskeletal:  Positive for arthralgias (right lower leg). Negative for myalgias.   Neurological:  Negative for dizziness, weakness and headaches.       Except as noted above the remainder of the review of systems was reviewed and negative.       PAST MEDICAL HISTORY     Past Medical History:   Diagnosis Date    Acid indigestion     Alopecia     Chest pain     COVID-19 12/9/2021    Depression 3/2/2011    Diverticulitis

## 2025-02-04 NOTE — ED NOTES
Orthopedic tall boot applied as ordered. Patient educated on RICE and proper application of boot. Verbalized understanding and ambulatory at discharge.

## 2025-02-05 ENCOUNTER — TELEPHONE (OUTPATIENT)
Age: 65
End: 2025-02-05

## 2025-02-05 NOTE — TELEPHONE ENCOUNTER
Called spoke with Pt. Verified ID x2. Asked how she was feeling since she was d/c from ER for fall. Pt stated foot is in a lot of pain. She ended up fracturing it. They d/c her with a boot to stabilize it. Pt stated she is following up with ortho and calling them tomorrow to schedule. Asked if she would like to move her ov to tomorrow or Friday, per [facility] can be VV as we have her lab results. Pt stated her insurance does not cover VV as she has already tried that previously. She prefers to keep her 2/17 appointment as scheduled as she already has a friend bringing her that day.

## 2025-02-06 NOTE — PROGRESS NOTES
[facility]  [address], [address] [place] [address]  W: [phone]  F: [phone]    Reason for Visit:   Kristin Fritz is a 64 y.o. female with a history of lupus, Sjogren's disease, PUD who is seen for follow up of iron deficiency anemia.    History of Present Illness:   Kristin Fritz is a pleasant 64 y.o. female who presents today for evaluation of iron deficiency anemia.    Labs 10/21/24: H/H 8.8/31.4, ferritin 5, iron 13, iron sat 3%.   She believes she may have been iron deficient during one of her pregnancies but cannot recall if she was on oral iron.   Over the last year, she has been worked up by Dr. Rodriguez and Dr. Salazar for heartburn/epigastric discomfort. She underwent laparoscopic revision of fundoplication to a partial fundoplication 11/2023 at [facility] (Dr. Toy Rodriguez). She has undergone several upper GI exams over the last year. She believes she had a colonoscopy ~1 year ago with a normal report. She has a history of a bleeding ulcer. She takes omeprazole daily. Her last visit with Dr. Salazar was a couple of months ago and she was told to follow up in 5 years.     She has never required a prior IV iron infusion or blood transfusion    Interval History:   She presents today for follow up and lab review.   SP Venofer 200 mg x5 doses (11/11, 11/19, 11/26, 12/3, 12/10/24). Tolerated the infusions well without complications.   Repeat labs H/H 13.5/44.3, 21%, iron 66, ferritin 135.   She feels well since receiving IV iron.   Her energy level has significantly improved.   She not longer craves cold fluids.   Restless leg resolved.   RUSHING has also resolved. She no longer needs to use her inhaler (previously was requiring this daily).   She fractured her ankle in the interval. She is following with [facility] (Dr. Hutchinson)  She completed PillCam 12/12/24 with Dr. Salazar. Our office was unsuccessful at obtaining results. Patient brings report on her phone

## 2025-02-17 ENCOUNTER — OFFICE VISIT (OUTPATIENT)
Age: 65
End: 2025-02-17
Payer: COMMERCIAL

## 2025-02-17 VITALS
HEART RATE: 84 BPM | TEMPERATURE: 98.4 F | SYSTOLIC BLOOD PRESSURE: 116 MMHG | BODY MASS INDEX: 25.7 KG/M2 | WEIGHT: 159.2 LBS | RESPIRATION RATE: 19 BRPM | DIASTOLIC BLOOD PRESSURE: 78 MMHG | OXYGEN SATURATION: 96 %

## 2025-02-17 DIAGNOSIS — D50.9 IRON DEFICIENCY ANEMIA, UNSPECIFIED IRON DEFICIENCY ANEMIA TYPE: Primary | ICD-10-CM

## 2025-02-17 PROCEDURE — 99213 OFFICE O/P EST LOW 20 MIN: CPT

## 2025-02-17 PROCEDURE — 3078F DIAST BP <80 MM HG: CPT

## 2025-02-17 PROCEDURE — 3074F SYST BP LT 130 MM HG: CPT

## 2025-02-17 NOTE — PROGRESS NOTES
Kristin Fritz is a 64 y.o. female    Chief Complaint   Patient presents with    Follow-up      iron deficiency anemia       1. Have you been to the ER, urgent care clinic since your last visit?  Hospitalized since your last visit? Yes, [facility] [place] broken leg.    2. Have you seen or consulted any other health care providers outside of the [facility] since your last visit?  Include any pap smears or colon screening. Yes, [facility] Mercado started pain narcs for broken leg. Unsure of name of med.

## 2025-02-28 ENCOUNTER — OFFICE VISIT (OUTPATIENT)
Dept: PRIMARY CARE CLINIC | Facility: CLINIC | Age: 65
End: 2025-02-28

## 2025-02-28 VITALS
HEART RATE: 83 BPM | WEIGHT: 160 LBS | TEMPERATURE: 97.5 F | SYSTOLIC BLOOD PRESSURE: 103 MMHG | HEIGHT: 66 IN | DIASTOLIC BLOOD PRESSURE: 67 MMHG | OXYGEN SATURATION: 94 % | BODY MASS INDEX: 25.71 KG/M2 | RESPIRATION RATE: 16 BRPM

## 2025-02-28 DIAGNOSIS — E78.2 MIXED HYPERLIPIDEMIA: Primary | ICD-10-CM

## 2025-02-28 DIAGNOSIS — Z91.09 ENVIRONMENTAL ALLERGIES: ICD-10-CM

## 2025-02-28 DIAGNOSIS — R92.8 ABNORMAL FINDING ON BREAST IMAGING: ICD-10-CM

## 2025-02-28 DIAGNOSIS — R35.0 URINARY FREQUENCY: ICD-10-CM

## 2025-02-28 DIAGNOSIS — M54.31 SCIATICA OF RIGHT SIDE: ICD-10-CM

## 2025-02-28 DIAGNOSIS — G43.709 CHRONIC MIGRAINE WITHOUT AURA WITHOUT STATUS MIGRAINOSUS, NOT INTRACTABLE: ICD-10-CM

## 2025-02-28 DIAGNOSIS — I10 ESSENTIAL HYPERTENSION: ICD-10-CM

## 2025-02-28 DIAGNOSIS — N32.81 OAB (OVERACTIVE BLADDER): ICD-10-CM

## 2025-02-28 DIAGNOSIS — Z23 ENCOUNTER FOR IMMUNIZATION: ICD-10-CM

## 2025-02-28 DIAGNOSIS — J44.9 CHRONIC OBSTRUCTIVE PULMONARY DISEASE, UNSPECIFIED COPD TYPE (HCC): ICD-10-CM

## 2025-02-28 DIAGNOSIS — D50.0 IRON DEFICIENCY ANEMIA DUE TO CHRONIC BLOOD LOSS: ICD-10-CM

## 2025-02-28 LAB
BILIRUBIN, URINE, POC: NEGATIVE
BLOOD URINE, POC: NEGATIVE
GLUCOSE URINE, POC: NEGATIVE
KETONES, URINE, POC: NEGATIVE
LEUKOCYTE ESTERASE, URINE, POC: NEGATIVE
NITRITE, URINE, POC: NEGATIVE
PH, URINE, POC: 7 (ref 4.6–8)
PROTEIN,URINE, POC: NEGATIVE
SPECIFIC GRAVITY, URINE, POC: 1.02 (ref 1–1.03)
URINALYSIS CLARITY, POC: CLEAR
URINALYSIS COLOR, POC: YELLOW
UROBILINOGEN, POC: NORMAL MG/DL

## 2025-02-28 RX ORDER — LOSARTAN POTASSIUM 50 MG/1
50 TABLET ORAL DAILY
Qty: 90 TABLET | Refills: 1 | Status: SHIPPED | OUTPATIENT
Start: 2025-02-28

## 2025-02-28 RX ORDER — BUTALBITAL, ACETAMINOPHEN AND CAFFEINE 50; 325; 40 MG/1; MG/1; MG/1
TABLET ORAL
COMMUNITY
End: 2025-02-28 | Stop reason: SDUPTHER

## 2025-02-28 RX ORDER — BUTALBITAL, ACETAMINOPHEN AND CAFFEINE 50; 325; 40 MG/1; MG/1; MG/1
1 TABLET ORAL EVERY 6 HOURS PRN
Qty: 30 TABLET | Refills: 3 | Status: SHIPPED | OUTPATIENT
Start: 2025-02-28

## 2025-02-28 RX ORDER — LORATADINE 10 MG/1
10 TABLET ORAL DAILY
Qty: 90 TABLET | Refills: 3 | Status: SHIPPED | OUTPATIENT
Start: 2025-02-28

## 2025-02-28 SDOH — ECONOMIC STABILITY: FOOD INSECURITY: WITHIN THE PAST 12 MONTHS, THE FOOD YOU BOUGHT JUST DIDN'T LAST AND YOU DIDN'T HAVE MONEY TO GET MORE.: NEVER TRUE

## 2025-02-28 SDOH — ECONOMIC STABILITY: FOOD INSECURITY: WITHIN THE PAST 12 MONTHS, YOU WORRIED THAT YOUR FOOD WOULD RUN OUT BEFORE YOU GOT MONEY TO BUY MORE.: NEVER TRUE

## 2025-02-28 ASSESSMENT — ENCOUNTER SYMPTOMS
CONSTIPATION: 0
DIARRHEA: 0
BLOOD IN STOOL: 0
ABDOMINAL PAIN: 0
WHEEZING: 0
SHORTNESS OF BREATH: 0
NAUSEA: 0
COUGH: 0
VOMITING: 0

## 2025-02-28 ASSESSMENT — PATIENT HEALTH QUESTIONNAIRE - PHQ9
2. FEELING DOWN, DEPRESSED OR HOPELESS: NOT AT ALL
SUM OF ALL RESPONSES TO PHQ QUESTIONS 1-9: 0
SUM OF ALL RESPONSES TO PHQ QUESTIONS 1-9: 0
1. LITTLE INTEREST OR PLEASURE IN DOING THINGS: NOT AT ALL
SUM OF ALL RESPONSES TO PHQ QUESTIONS 1-9: 0
SUM OF ALL RESPONSES TO PHQ9 QUESTIONS 1 & 2: 0
SUM OF ALL RESPONSES TO PHQ QUESTIONS 1-9: 0

## 2025-02-28 NOTE — PROGRESS NOTES
\"Have you been to the ER, urgent care clinic since your last visit?  Hospitalized since your last visit?\"    02/04/25 Fracture and sprained ankle    “Have you seen or consulted any other health care providers outside our system since your last visit?”    Dr. [facility]     Have you had a mammogram?”   needed    Date of last Mammogram: 8/29/2023              
02/03/2025 3  Not Estab. % Final    Basophils % 02/03/2025 1  Not Estab. % Final    Neutrophils Absolute 02/03/2025 2.4  1.4 - 7.0 x10E3/uL Final    Lymphocytes Absolute 02/03/2025 0.8  0.7 - 3.1 x10E3/uL Final    Monocytes Absolute 02/03/2025 0.3  0.1 - 0.9 x10E3/uL Final    Eosinophils Absolute 02/03/2025 0.1  0.0 - 0.4 x10E3/uL Final    Basophils Absolute 02/03/2025 0.0  0.0 - 0.2 x10E3/uL Final    Immature Granulocytes % 02/03/2025 0  Not Estab. % Final    Immature Grans (Abs) 02/03/2025 0.0  0.0 - 0.1 x10E3/uL Final    TIBC 02/03/2025 307  250 - 450 ug/dL Final    UIBC 02/03/2025 241  118 - 369 ug/dL Final    Iron 02/03/2025 66  27 - 139 ug/dL Final    Iron % Saturation 02/03/2025 21  15 - 55 % Final    Ferritin 02/03/2025 135  15 - 150 ng/mL Final   Hospital Outpatient Visit on 12/03/2024   Component Date Value Ref Range Status    Hemoglobin 12/03/2024 12.6  11.5 - 16.0 g/dL Final    Hematocrit 12/03/2024 42.8  35.0 - 47.0 % Final        REVIEW OF SYSTEMS   Review of Systems   Constitutional:  Negative for chills, diaphoresis, fatigue and fever.   Eyes:  Negative for visual disturbance.   Respiratory:  Negative for cough, shortness of breath and wheezing.    Cardiovascular:  Negative for chest pain, palpitations and leg swelling.   Gastrointestinal:  Negative for abdominal pain, blood in stool, constipation, diarrhea, nausea and vomiting.   Genitourinary:  Positive for frequency. Negative for dysuria, hematuria, menstrual problem and urgency.   Musculoskeletal:  Negative for arthralgias and myalgias.   Allergic/Immunologic: Positive for environmental allergies.   Neurological:  Positive for headaches. Negative for dizziness and light-headedness.   Psychiatric/Behavioral:  Negative for dysphoric mood. The patient is not nervous/anxious.        PHYSICAL EXAM   /67 (Site: Right Upper Arm, Position: Sitting, Cuff Size: Small Adult)   Pulse 83   Temp 97.5 °F (36.4 °C) (Temporal)   Resp 16   Ht 1.676 m (5'

## 2025-03-03 DIAGNOSIS — I10 ESSENTIAL HYPERTENSION: ICD-10-CM

## 2025-03-03 DIAGNOSIS — E78.2 MIXED HYPERLIPIDEMIA: ICD-10-CM

## 2025-03-04 DIAGNOSIS — R79.89 LOW TSH LEVEL: Primary | ICD-10-CM

## 2025-03-04 LAB
ALBUMIN SERPL-MCNC: 4.3 G/DL (ref 3.9–4.9)
ALP SERPL-CCNC: 94 IU/L (ref 44–121)
ALT SERPL-CCNC: 12 IU/L (ref 0–32)
AST SERPL-CCNC: 10 IU/L (ref 0–40)
BILIRUB SERPL-MCNC: 0.2 MG/DL (ref 0–1.2)
BUN SERPL-MCNC: 15 MG/DL (ref 8–27)
BUN/CREAT SERPL: 36 (ref 12–28)
CALCIUM SERPL-MCNC: 9.5 MG/DL (ref 8.7–10.3)
CHLORIDE SERPL-SCNC: 102 MMOL/L (ref 96–106)
CHOLEST SERPL-MCNC: 216 MG/DL (ref 100–199)
CO2 SERPL-SCNC: 27 MMOL/L (ref 20–29)
CREAT SERPL-MCNC: 0.42 MG/DL (ref 0.57–1)
EGFRCR SERPLBLD CKD-EPI 2021: 109 ML/MIN/1.73
GLOBULIN SER CALC-MCNC: 2 G/DL (ref 1.5–4.5)
GLUCOSE SERPL-MCNC: 84 MG/DL (ref 70–99)
HDLC SERPL-MCNC: 73 MG/DL
INTERPRETIVE COMMENT: NORMAL
LDLC SERPL CALC-MCNC: 127 MG/DL (ref 0–99)
POTASSIUM SERPL-SCNC: 4.5 MMOL/L (ref 3.5–5.2)
PROT SERPL-MCNC: 6.3 G/DL (ref 6–8.5)
SODIUM SERPL-SCNC: 141 MMOL/L (ref 134–144)
T3FREE SERPL-MCNC: 3.4 PG/ML (ref 2–4.4)
T4 FREE SERPL-MCNC: 1.25 NG/DL (ref 0.82–1.77)
TRIGL SERPL-MCNC: 92 MG/DL (ref 0–149)
TSH SERPL DL<=0.005 MIU/L-ACNC: 0.24 UIU/ML (ref 0.45–4.5)
VLDLC SERPL CALC-MCNC: 16 MG/DL (ref 5–40)

## 2025-04-11 ENCOUNTER — OFFICE VISIT (OUTPATIENT)
Age: 65
End: 2025-04-11

## 2025-04-11 VITALS
BODY MASS INDEX: 26.2 KG/M2 | HEIGHT: 66 IN | SYSTOLIC BLOOD PRESSURE: 96 MMHG | HEART RATE: 87 BPM | DIASTOLIC BLOOD PRESSURE: 58 MMHG | WEIGHT: 163 LBS

## 2025-04-11 DIAGNOSIS — E04.2 MULTIPLE THYROID NODULES: ICD-10-CM

## 2025-04-11 DIAGNOSIS — E05.90 SUBCLINICAL HYPERTHYROIDISM: ICD-10-CM

## 2025-04-11 RX ORDER — OXYBUTYNIN CHLORIDE 10 MG/1
1 TABLET, EXTENDED RELEASE ORAL DAILY
COMMUNITY
Start: 2025-03-17

## 2025-04-11 NOTE — PROGRESS NOTES
Diagnostic Thyroid Ultrasound    Date: 4/11/2025     Real time ultrasound of the thyroid gland was performed in both transverse and sagittal planes    Right Lobe:  Size: 1.52 x 1.23 x 3.82 cm  Texture: slight hetero  Nodules: handful of sub 6 mm cyst/nodules with dominant:  1.19 x 0.83 x 1.37 cm mostly hypoechoic with some posterior enhancement      Isthmus:    Size: 0.25cm  Nodules: none      Left Lobe:  Size: 1.53 x 1.37 x 3.83 cm  Texture: slight hetero  Nodules: handful sub 5mm cyst/nodules with dominant:  1.25 x 0.67 x 1.38 cm complex cystic nodule         Impression:  Multiple small complex cystic nodule with dominant bilateral ones under 1.5 cm.  Fine Needle Aspiration not indicated       RIGHT        LEFT

## 2025-04-11 NOTE — PROGRESS NOTES
Chief Complaint   Patient presents with    Abnormal Lab    New Patient     The patient (or guardian, if applicable) and other individuals in attendance with the patient were advised that Artificial Intelligence will be utilized during this visit to record, process the conversation to generate a clinical note, and support improvement of the AI technology. The patient (or guardian, if applicable) and other individuals in attendance at the appointment consented to the use of AI, including the recording.         * New Patient Visit     General:  History of Present Illness  The patient presents for evaluation of her thyroid.    She was previously unaware of her slightly abnormal TSH levels, which have been consistently low or low normal since 2011. She reports weight gain and has not undergone a neck ultrasound to evaluate her thyroid. She is not experiencing any changes in bowel movements, tremors, or heart racing. She reports difficulty swallowing, with food often feeling as though it is lodged in her upper mid-chest. She has a history of steroid use, with the last course completed approximately 1.5 to 2 years ago.    She recently completed an iron infusion due to unexplained low iron levels, a condition that has also perplexed her gastroenterologist. Despite this, she reports no current fatigue. She experiences night sweats and bruises easily, which she attributes to her iron deficiency. She also reports sleep disturbances, with less than 2 hours of sleep achieved the previous night. She is menopausal and has some memory loss. She reports mild anxiety but no depression.    She has a history of lupus, diagnosed 8 years ago, but discontinued her medication due to adverse effects and has not experienced any pain or flare-ups since.       Family History of thyroid disease: none    Thyroid Symptoms  denies change in energy, **has weight gain**, denies change in appetite, **has sleep issues**, denies hair changes, no skin

## 2025-04-17 LAB
FT4I SERPL CALC-MCNC: 2.1 (ref 1.2–4.9)
T3 SERPL-MCNC: 134 NG/DL (ref 71–180)
T3RU NFR SERPL: 25 % (ref 24–39)
T4 SERPL-MCNC: 8.3 UG/DL (ref 4.5–12)
THYROGLOB AB SERPL-ACNC: <1 IU/ML (ref 0–0.9)
THYROPEROXIDASE AB SERPL-ACNC: 14 IU/ML (ref 0–34)
TSH RECEP AB SER-ACNC: <1.1 IU/L (ref 0–1.75)
TSH SERPL DL<=0.005 MIU/L-ACNC: 0.57 UIU/ML (ref 0.45–4.5)
TSI SER-ACNC: <0.1 IU/L (ref 0–0.55)

## 2025-04-18 ENCOUNTER — RESULTS FOLLOW-UP (OUTPATIENT)
Age: 65
End: 2025-04-18

## 2025-04-29 RX ORDER — SUMATRIPTAN 50 MG/1
50 TABLET, FILM COATED ORAL PRN
Qty: 9 TABLET | Refills: 5 | Status: SHIPPED | OUTPATIENT
Start: 2025-04-29

## 2025-05-02 ENCOUNTER — OFFICE VISIT (OUTPATIENT)
Dept: PRIMARY CARE CLINIC | Facility: CLINIC | Age: 65
End: 2025-05-02

## 2025-05-02 VITALS
HEART RATE: 77 BPM | DIASTOLIC BLOOD PRESSURE: 72 MMHG | WEIGHT: 163 LBS | OXYGEN SATURATION: 99 % | TEMPERATURE: 97.9 F | HEIGHT: 66 IN | BODY MASS INDEX: 26.2 KG/M2 | SYSTOLIC BLOOD PRESSURE: 117 MMHG | RESPIRATION RATE: 17 BRPM

## 2025-05-02 DIAGNOSIS — J40 BRONCHITIS: Primary | ICD-10-CM

## 2025-05-02 DIAGNOSIS — Z91.09 ENVIRONMENTAL ALLERGIES: ICD-10-CM

## 2025-05-02 DIAGNOSIS — M25.522 BILATERAL ELBOW JOINT PAIN: ICD-10-CM

## 2025-05-02 DIAGNOSIS — M25.521 BILATERAL ELBOW JOINT PAIN: ICD-10-CM

## 2025-05-02 RX ORDER — PREDNISONE 20 MG/1
TABLET ORAL
Qty: 16 TABLET | Refills: 0 | Status: SHIPPED | OUTPATIENT
Start: 2025-05-02

## 2025-05-02 RX ORDER — CETIRIZINE HYDROCHLORIDE 10 MG/1
10 TABLET ORAL DAILY
Qty: 90 TABLET | Refills: 0 | Status: SHIPPED | OUTPATIENT
Start: 2025-05-02

## 2025-05-02 SDOH — ECONOMIC STABILITY: FOOD INSECURITY: WITHIN THE PAST 12 MONTHS, YOU WORRIED THAT YOUR FOOD WOULD RUN OUT BEFORE YOU GOT MONEY TO BUY MORE.: NEVER TRUE

## 2025-05-02 SDOH — ECONOMIC STABILITY: FOOD INSECURITY: WITHIN THE PAST 12 MONTHS, THE FOOD YOU BOUGHT JUST DIDN'T LAST AND YOU DIDN'T HAVE MONEY TO GET MORE.: NEVER TRUE

## 2025-05-02 ASSESSMENT — ENCOUNTER SYMPTOMS
BLOOD IN STOOL: 0
NAUSEA: 1
SHORTNESS OF BREATH: 0
CONSTIPATION: 0
ABDOMINAL PAIN: 0
SORE THROAT: 0
SINUS PAIN: 0
RHINORRHEA: 0
COUGH: 1
VOMITING: 0
CHEST TIGHTNESS: 1
WHEEZING: 0

## 2025-05-02 NOTE — PROGRESS NOTES
[facility]   [address], [address]  [place] [address]  P: [phone]  F: [phone]    SUBJECTIVE     HPI:     Kristin Fritz is a 64 y.o. female who is seen in the clinic for   Chief Complaint   Patient presents with    Cough     All day and night    Congestion     Patient is having chest congestion  Coughs up yellow-brownish mucus    Arthritis     elbow        The patient presents to the office today c/o of cough    Symptoms started about 1 week go. She reports cough, chest pain with couging, mucus production. She does have some SOB, but she attributed this to the heat. 4/30 she had diarrhea and nausea for 3 hours, but this resolved on its own. Denies wheezing, fever, chill, sinus pain, ear pain. Her granddaughter visits and was coughing and sneezing. She has been using Mucinex x 1 week and cough drops without improvement. She has been on Claritin. She has been using her albuterol inhaler 4-6 times daily. Compliant on Trelegy.    Bilateral elbow pain  Has been having intermittent elbow pain x 5-6 weeks. Worse in right elbow. Has tried Advil, naproxen, tylenol arthritis without significant relief.    Patient Active Problem List   Diagnosis    Hyperlipidemia    Inflammatory arthritis    Vitamin D deficiency    Social anxiety disorder    Diverticulitis    H. pylori infection    Chest pain    Migraines    Long-term use of immunosuppressant medication    Weight loss    COVID-19    Raynaud's syndrome    Smoking    Depression    Diverticulosis of colon    Family history of esophageal cancer    Sjogren's disease    Mood disorder due to a general medical condition    Migraine without aura and without status migrainosus, not intractable    PUD (peptic ulcer disease)    Iron deficiency anemia due to chronic blood loss    Shortness of breath    Hypertension        Past Medical History:   Diagnosis Date    Acid indigestion     Alopecia     Chest pain     COVID-19 12/9/2021    Depression 3/2/2011

## 2025-05-02 NOTE — PROGRESS NOTES
Health Decision Maker has been checked with the patient          Chief Complaint   Patient presents with    Cough     All day and night    Congestion     Patient is having chest congestion  Coughs up yellow-brownish mucus       \"Have you been to the ER, urgent care clinic since your last visit?  Hospitalized since your last visit?\"    NO    “Have you seen or consulted any other health care providers outside of [facility] since your last visit?”    NO      Vitals:    05/02/25 1319   TempSrc: Oral   Weight: 73.9 kg (163 lb)   Height: 1.676 m (5' 6\")      Depression: Not at risk (2/28/2025)    PHQ-2     PHQ-2 Score: 0      Have you had a mammogram?”   NO    Date of last Mammogram: 8/29/2023             Click Here for Release of Records Request    Chart reviewed: immunizations are documented.   Immunization History   Administered Date(s) Administered    Influenza Virus Vaccine 11/21/2011    Influenza, FLUARIX, FLULAVAL, FLUZONE (age 6 mo+) and AFLURIA, (age 3 y+), Quadv PF, 0.5mL 01/13/2015    Pneumococcal, PCV-13, PREVNAR 13, (age 6w+), IM, 0.5mL 10/27/2015    Pneumococcal, PCV20, PREVNAR 20, (age 6w+), IM, 0.5mL 02/28/2025    Pneumococcal, PPSV23, PNEUMOVAX 23, (age 2y+), SC/IM, 0.5mL 07/18/2014    TD 5LF, TENIVAC, (age 7y+), IM, 0.5mL 04/01/2013    TDaP, ADACEL (age 10y-64y), BOOSTRIX (age 10y+), IM, 0.5mL 08/07/2020    Zoster Recombinant (Shingrix) 08/07/2020, 02/09/2021

## 2025-05-12 ENCOUNTER — PATIENT MESSAGE (OUTPATIENT)
Dept: PRIMARY CARE CLINIC | Facility: CLINIC | Age: 65
End: 2025-05-12

## 2025-05-12 DIAGNOSIS — R05.1 ACUTE COUGH: Primary | ICD-10-CM

## 2025-05-13 ENCOUNTER — HOSPITAL ENCOUNTER (OUTPATIENT)
Facility: HOSPITAL | Age: 65
Discharge: HOME | End: 2025-05-16
Payer: COMMERCIAL

## 2025-05-13 DIAGNOSIS — R05.1 ACUTE COUGH: ICD-10-CM

## 2025-05-13 PROCEDURE — 71046 X-RAY EXAM CHEST 2 VIEWS: CPT

## 2025-05-14 ENCOUNTER — RESULTS FOLLOW-UP (OUTPATIENT)
Dept: PRIMARY CARE CLINIC | Facility: CLINIC | Age: 65
End: 2025-05-14

## 2025-05-29 DIAGNOSIS — M54.31 SCIATICA OF RIGHT SIDE: ICD-10-CM

## 2025-05-29 RX ORDER — ALBUTEROL SULFATE 90 UG/1
2 INHALANT RESPIRATORY (INHALATION) EVERY 4 HOURS PRN
Qty: 8.5 EACH | Refills: 5 | Status: SHIPPED | OUTPATIENT
Start: 2025-05-29

## 2025-06-27 DIAGNOSIS — G43.709 CHRONIC MIGRAINE WITHOUT AURA WITHOUT STATUS MIGRAINOSUS, NOT INTRACTABLE: ICD-10-CM

## 2025-07-01 DIAGNOSIS — G43.709 CHRONIC MIGRAINE WITHOUT AURA WITHOUT STATUS MIGRAINOSUS, NOT INTRACTABLE: ICD-10-CM

## 2025-07-01 RX ORDER — BUTALBITAL, ACETAMINOPHEN AND CAFFEINE 50; 325; 40 MG/1; MG/1; MG/1
1 TABLET ORAL EVERY 6 HOURS PRN
Qty: 30 TABLET | Refills: 3 | Status: SHIPPED | OUTPATIENT
Start: 2025-07-01

## 2025-07-02 RX ORDER — BUTALBITAL, ACETAMINOPHEN AND CAFFEINE 50; 325; 40 MG/1; MG/1; MG/1
1 TABLET ORAL EVERY 6 HOURS PRN
Qty: 30 TABLET | Refills: 3 | OUTPATIENT
Start: 2025-07-02

## 2025-08-15 RX ORDER — OMEPRAZOLE 20 MG/1
CAPSULE, DELAYED RELEASE ORAL
Qty: 90 CAPSULE | Refills: 1 | Status: SHIPPED | OUTPATIENT
Start: 2025-08-15